# Patient Record
Sex: MALE | Race: WHITE | Employment: FULL TIME | URBAN - METROPOLITAN AREA
[De-identification: names, ages, dates, MRNs, and addresses within clinical notes are randomized per-mention and may not be internally consistent; named-entity substitution may affect disease eponyms.]

---

## 2017-01-06 RX ORDER — LISINOPRIL 2.5 MG/1
TABLET ORAL
Qty: 30 TABLET | Refills: 2 | Status: SHIPPED | OUTPATIENT
Start: 2017-01-06 | End: 2017-04-17 | Stop reason: SDUPTHER

## 2017-01-13 RX ORDER — TRAZODONE HYDROCHLORIDE 150 MG/1
TABLET ORAL
Qty: 30 TABLET | Refills: 2 | Status: SHIPPED | OUTPATIENT
Start: 2017-01-13 | End: 2017-04-17 | Stop reason: SDUPTHER

## 2017-01-17 DIAGNOSIS — F17.209 NICOTINE DEPENDENCE WITH NICOTINE-INDUCED DISORDER, UNSPECIFIED NICOTINE PRODUCT TYPE: ICD-10-CM

## 2017-01-17 RX ORDER — BUPROPION HYDROCHLORIDE 150 MG/1
TABLET ORAL
Qty: 90 TABLET | Refills: 0 | Status: SHIPPED | OUTPATIENT
Start: 2017-01-17 | End: 2017-04-17 | Stop reason: SDUPTHER

## 2017-03-27 RX ORDER — ATORVASTATIN CALCIUM 40 MG/1
TABLET, FILM COATED ORAL
Qty: 30 TABLET | Refills: 2 | Status: SHIPPED | OUTPATIENT
Start: 2017-03-27 | End: 2017-06-28 | Stop reason: SDUPTHER

## 2017-04-14 LAB
ALBUMIN SERPL-MCNC: 4.2 G/DL
ALP BLD-CCNC: 62 U/L
ALT SERPL-CCNC: 18 U/L
AST SERPL-CCNC: 20 U/L
BILIRUB SERPL-MCNC: 0.7 MG/DL (ref 0.1–1.4)
BUN BLDV-MCNC: 22 MG/DL
CALCIUM SERPL-MCNC: 9.5 MG/DL
CHLORIDE BLD-SCNC: 103 MMOL/L
CHOLESTEROL, TOTAL: 176 MG/DL
CHOLESTEROL/HDL RATIO: 4.4
CO2: 28 MMOL/L
CREAT SERPL-MCNC: 1.35 MG/DL
GFR CALCULATED: 53
GLUCOSE BLD-MCNC: 98 MG/DL
HDLC SERPL-MCNC: 44 MG/DL (ref 35–70)
LDL CHOLESTEROL CALCULATED: 109 MG/DL (ref 0–160)
POTASSIUM SERPL-SCNC: 4.2 MMOL/L
SODIUM BLD-SCNC: 140 MMOL/L
TOTAL PROTEIN: 7.3
TRIGL SERPL-MCNC: 116 MG/DL
VLDLC SERPL CALC-MCNC: 23 MG/DL

## 2017-04-17 DIAGNOSIS — F17.209 NICOTINE DEPENDENCE WITH NICOTINE-INDUCED DISORDER, UNSPECIFIED NICOTINE PRODUCT TYPE: ICD-10-CM

## 2017-04-18 RX ORDER — BUPROPION HYDROCHLORIDE 150 MG/1
TABLET ORAL
Qty: 30 TABLET | Refills: 1 | Status: SHIPPED | OUTPATIENT
Start: 2017-04-18 | End: 2017-06-17 | Stop reason: SDUPTHER

## 2017-04-18 RX ORDER — TRAZODONE HYDROCHLORIDE 150 MG/1
TABLET ORAL
Qty: 30 TABLET | Refills: 1 | Status: SHIPPED | OUTPATIENT
Start: 2017-04-18 | End: 2017-06-17 | Stop reason: SDUPTHER

## 2017-04-18 RX ORDER — LISINOPRIL 2.5 MG/1
TABLET ORAL
Qty: 30 TABLET | Refills: 1 | Status: SHIPPED | OUTPATIENT
Start: 2017-04-18 | End: 2017-06-17 | Stop reason: SDUPTHER

## 2017-04-20 DIAGNOSIS — I10 ESSENTIAL HYPERTENSION: ICD-10-CM

## 2017-04-20 DIAGNOSIS — E78.5 HYPERLIPIDEMIA, UNSPECIFIED HYPERLIPIDEMIA TYPE: ICD-10-CM

## 2017-04-28 ENCOUNTER — OFFICE VISIT (OUTPATIENT)
Dept: FAMILY MEDICINE CLINIC | Age: 67
End: 2017-04-28
Payer: MEDICARE

## 2017-04-28 VITALS
TEMPERATURE: 98.8 F | WEIGHT: 184 LBS | SYSTOLIC BLOOD PRESSURE: 130 MMHG | HEART RATE: 75 BPM | BODY MASS INDEX: 26.34 KG/M2 | DIASTOLIC BLOOD PRESSURE: 74 MMHG | HEIGHT: 70 IN

## 2017-04-28 DIAGNOSIS — F41.9 ANXIETY: ICD-10-CM

## 2017-04-28 DIAGNOSIS — I25.810 CORONARY ARTERY DISEASE INVOLVING CORONARY BYPASS GRAFT OF NATIVE HEART WITHOUT ANGINA PECTORIS: ICD-10-CM

## 2017-04-28 DIAGNOSIS — I10 ESSENTIAL HYPERTENSION: Primary | ICD-10-CM

## 2017-04-28 PROCEDURE — 99213 OFFICE O/P EST LOW 20 MIN: CPT | Performed by: FAMILY MEDICINE

## 2017-04-28 ASSESSMENT — ENCOUNTER SYMPTOMS
COUGH: 0
SORE THROAT: 0
SHORTNESS OF BREATH: 0
ABDOMINAL PAIN: 0
NAUSEA: 0
CONSTIPATION: 0

## 2017-04-28 ASSESSMENT — PATIENT HEALTH QUESTIONNAIRE - PHQ9
1. LITTLE INTEREST OR PLEASURE IN DOING THINGS: 0
SUM OF ALL RESPONSES TO PHQ9 QUESTIONS 1 & 2: 0
SUM OF ALL RESPONSES TO PHQ QUESTIONS 1-9: 0
2. FEELING DOWN, DEPRESSED OR HOPELESS: 0

## 2017-06-17 DIAGNOSIS — F17.209 NICOTINE DEPENDENCE WITH NICOTINE-INDUCED DISORDER, UNSPECIFIED NICOTINE PRODUCT TYPE: ICD-10-CM

## 2017-06-19 RX ORDER — TRAZODONE HYDROCHLORIDE 150 MG/1
TABLET ORAL
Qty: 30 TABLET | Refills: 2 | Status: SHIPPED | OUTPATIENT
Start: 2017-06-19 | End: 2017-09-21 | Stop reason: SDUPTHER

## 2017-06-19 RX ORDER — BUPROPION HYDROCHLORIDE 150 MG/1
TABLET ORAL
Qty: 30 TABLET | Refills: 2 | Status: SHIPPED | OUTPATIENT
Start: 2017-06-19 | End: 2017-09-21 | Stop reason: SDUPTHER

## 2017-06-19 RX ORDER — LISINOPRIL 2.5 MG/1
TABLET ORAL
Qty: 30 TABLET | Refills: 2 | Status: SHIPPED | OUTPATIENT
Start: 2017-06-19 | End: 2017-09-21 | Stop reason: SDUPTHER

## 2017-06-29 RX ORDER — ATORVASTATIN CALCIUM 40 MG/1
TABLET, FILM COATED ORAL
Qty: 30 TABLET | Refills: 2 | Status: SHIPPED | OUTPATIENT
Start: 2017-06-29 | End: 2017-10-01 | Stop reason: SDUPTHER

## 2017-09-01 ENCOUNTER — OFFICE VISIT (OUTPATIENT)
Dept: FAMILY MEDICINE CLINIC | Age: 67
End: 2017-09-01
Payer: MEDICARE

## 2017-09-01 VITALS
OXYGEN SATURATION: 98 % | SYSTOLIC BLOOD PRESSURE: 118 MMHG | DIASTOLIC BLOOD PRESSURE: 70 MMHG | HEIGHT: 69 IN | WEIGHT: 186 LBS | BODY MASS INDEX: 27.55 KG/M2 | TEMPERATURE: 98.2 F | HEART RATE: 59 BPM

## 2017-09-01 DIAGNOSIS — Z12.11 COLON CANCER SCREENING: ICD-10-CM

## 2017-09-01 DIAGNOSIS — I10 ESSENTIAL HYPERTENSION: Primary | ICD-10-CM

## 2017-09-01 DIAGNOSIS — I25.810 CORONARY ARTERY DISEASE INVOLVING CORONARY BYPASS GRAFT OF NATIVE HEART WITHOUT ANGINA PECTORIS: ICD-10-CM

## 2017-09-01 DIAGNOSIS — H91.93 DIMINISHED HEARING, BILATERAL: ICD-10-CM

## 2017-09-01 PROCEDURE — 99213 OFFICE O/P EST LOW 20 MIN: CPT | Performed by: FAMILY MEDICINE

## 2017-09-01 RX ORDER — HYDROXYZINE HYDROCHLORIDE 25 MG/1
25 TABLET, FILM COATED ORAL 3 TIMES DAILY PRN
COMMUNITY

## 2017-09-01 RX ORDER — FLUOXETINE HYDROCHLORIDE 60 MG/1
60 TABLET, FILM COATED ORAL; ORAL DAILY
COMMUNITY
End: 2019-12-26 | Stop reason: ALTCHOICE

## 2017-09-01 ASSESSMENT — ENCOUNTER SYMPTOMS
ABDOMINAL PAIN: 0
SORE THROAT: 0
NAUSEA: 0
DIARRHEA: 0
SHORTNESS OF BREATH: 0
COUGH: 0

## 2017-09-21 DIAGNOSIS — F17.209 NICOTINE DEPENDENCE WITH NICOTINE-INDUCED DISORDER, UNSPECIFIED NICOTINE PRODUCT TYPE: ICD-10-CM

## 2017-09-21 RX ORDER — LISINOPRIL 2.5 MG/1
TABLET ORAL
Qty: 30 TABLET | Refills: 2 | Status: SHIPPED | OUTPATIENT
Start: 2017-09-21 | End: 2017-11-27 | Stop reason: SDUPTHER

## 2017-09-21 RX ORDER — TRAZODONE HYDROCHLORIDE 150 MG/1
TABLET ORAL
Qty: 30 TABLET | Refills: 2 | Status: SHIPPED | OUTPATIENT
Start: 2017-09-21 | End: 2017-11-27 | Stop reason: SDUPTHER

## 2017-09-21 RX ORDER — BUPROPION HYDROCHLORIDE 150 MG/1
TABLET ORAL
Qty: 30 TABLET | Refills: 2 | Status: SHIPPED | OUTPATIENT
Start: 2017-09-21

## 2017-10-02 RX ORDER — ATORVASTATIN CALCIUM 40 MG/1
TABLET, FILM COATED ORAL
Qty: 30 TABLET | Refills: 2 | Status: SHIPPED | OUTPATIENT
Start: 2017-10-02 | End: 2017-11-27 | Stop reason: SDUPTHER

## 2017-11-27 RX ORDER — ATORVASTATIN CALCIUM 40 MG/1
TABLET, FILM COATED ORAL
Qty: 30 TABLET | Refills: 2 | Status: SHIPPED | OUTPATIENT
Start: 2017-11-27

## 2017-11-27 RX ORDER — TRAZODONE HYDROCHLORIDE 150 MG/1
TABLET ORAL
Qty: 30 TABLET | Refills: 2 | Status: SHIPPED | OUTPATIENT
Start: 2017-11-27

## 2017-11-27 RX ORDER — LISINOPRIL 2.5 MG/1
TABLET ORAL
Qty: 30 TABLET | Refills: 2 | Status: SHIPPED | OUTPATIENT
Start: 2017-11-27 | End: 2019-12-26 | Stop reason: ALTCHOICE

## 2017-12-01 LAB
ALBUMIN SERPL-MCNC: 3.9 G/DL
ALP BLD-CCNC: 63 U/L
ALT SERPL-CCNC: 13 U/L
ANION GAP SERPL CALCULATED.3IONS-SCNC: 8 MMOL/L
AST SERPL-CCNC: 19 U/L
AVERAGE GLUCOSE: 120
BASOPHILS ABSOLUTE: NORMAL /ΜL
BASOPHILS RELATIVE PERCENT: NORMAL %
BILIRUB SERPL-MCNC: 0.6 MG/DL (ref 0.1–1.4)
BUN BLDV-MCNC: 21 MG/DL
CALCIUM SERPL-MCNC: 9.3 MG/DL
CHLORIDE BLD-SCNC: 103 MMOL/L
CHOLESTEROL, TOTAL: 179 MG/DL
CHOLESTEROL/HDL RATIO: 3.8
CO2: 26 MMOL/L
CREAT SERPL-MCNC: 1.23 MG/DL
EOSINOPHILS ABSOLUTE: NORMAL /ΜL
EOSINOPHILS RELATIVE PERCENT: NORMAL %
GFR CALCULATED: 59
GLUCOSE BLD-MCNC: 90 MG/DL
HBA1C MFR BLD: 5.8 %
HCT VFR BLD CALC: 43.4 % (ref 41–53)
HDLC SERPL-MCNC: 47 MG/DL (ref 35–70)
HEMOGLOBIN: 14.8 G/DL (ref 13.5–17.5)
LDL CHOLESTEROL CALCULATED: 97 MG/DL (ref 0–160)
LYMPHOCYTES ABSOLUTE: NORMAL /ΜL
LYMPHOCYTES RELATIVE PERCENT: NORMAL %
MCH RBC QN AUTO: 31.7 PG
MCHC RBC AUTO-ENTMCNC: 34 G/DL
MCV RBC AUTO: 93 FL
MONOCYTES ABSOLUTE: NORMAL /ΜL
MONOCYTES RELATIVE PERCENT: NORMAL %
NEUTROPHILS ABSOLUTE: NORMAL /ΜL
NEUTROPHILS RELATIVE PERCENT: NORMAL %
PLATELET # BLD: 234 K/ΜL
PMV BLD AUTO: 8.7 FL
POTASSIUM SERPL-SCNC: 4.4 MMOL/L
RBC # BLD: 4.66 10^6/ΜL
SODIUM BLD-SCNC: 137 MMOL/L
TOTAL PROTEIN: 6.7
TRIGL SERPL-MCNC: 173 MG/DL
VITAMIN B-12: 324
VITAMIN D 25-HYDROXY: 27.1
VITAMIN D2, 25 HYDROXY: NORMAL
VITAMIN D3,25 HYDROXY: NORMAL
VLDLC SERPL CALC-MCNC: 35 MG/DL
WBC # BLD: 9.4 10^3/ML

## 2017-12-18 ENCOUNTER — TELEPHONE (OUTPATIENT)
Dept: OTHER | Facility: CLINIC | Age: 67
End: 2017-12-18

## 2017-12-23 DIAGNOSIS — F17.209 NICOTINE DEPENDENCE WITH NICOTINE-INDUCED DISORDER, UNSPECIFIED NICOTINE PRODUCT TYPE: ICD-10-CM

## 2017-12-26 RX ORDER — BUPROPION HYDROCHLORIDE 150 MG/1
TABLET ORAL
Qty: 30 TABLET | Refills: 0 | OUTPATIENT
Start: 2017-12-26

## 2018-02-27 RX ORDER — ATORVASTATIN CALCIUM 40 MG/1
TABLET, FILM COATED ORAL
Qty: 90 TABLET | Refills: 0 | OUTPATIENT
Start: 2018-02-27

## 2019-12-26 ENCOUNTER — HOSPITAL ENCOUNTER (OUTPATIENT)
Dept: PREADMISSION TESTING | Age: 69
Discharge: HOME | End: 2019-12-30
Payer: MEDICARE

## 2019-12-26 VITALS
HEART RATE: 67 BPM | DIASTOLIC BLOOD PRESSURE: 77 MMHG | BODY MASS INDEX: 33.62 KG/M2 | SYSTOLIC BLOOD PRESSURE: 121 MMHG | RESPIRATION RATE: 20 BRPM | HEIGHT: 69 IN | WEIGHT: 227 LBS | TEMPERATURE: 98.2 F

## 2019-12-26 LAB
ANION GAP SERPL CALCULATED.3IONS-SCNC: 10 MMOL/L (ref 9–17)
BUN BLDV-MCNC: 17 MG/DL (ref 8–23)
CHLORIDE BLD-SCNC: 104 MMOL/L (ref 98–107)
CO2: 26 MMOL/L (ref 20–31)
CREAT SERPL-MCNC: 1.1 MG/DL (ref 0.7–1.2)
GFR AFRICAN AMERICAN: >60 ML/MIN
GFR NON-AFRICAN AMERICAN: >60 ML/MIN
GFR SERPL CREATININE-BSD FRML MDRD: NORMAL ML/MIN/{1.73_M2}
GFR SERPL CREATININE-BSD FRML MDRD: NORMAL ML/MIN/{1.73_M2}
GLUCOSE BLD-MCNC: 109 MG/DL (ref 70–99)
HCT VFR BLD CALC: 40.9 % (ref 40.7–50.3)
HEMOGLOBIN: 12.9 G/DL (ref 13–17)
POTASSIUM SERPL-SCNC: 4.3 MMOL/L (ref 3.7–5.3)
SODIUM BLD-SCNC: 140 MMOL/L (ref 135–144)

## 2019-12-26 PROCEDURE — 36415 COLL VENOUS BLD VENIPUNCTURE: CPT

## 2019-12-26 PROCEDURE — 82565 ASSAY OF CREATININE: CPT

## 2019-12-26 PROCEDURE — 80051 ELECTROLYTE PANEL: CPT

## 2019-12-26 PROCEDURE — 85018 HEMOGLOBIN: CPT

## 2019-12-26 PROCEDURE — 84520 ASSAY OF UREA NITROGEN: CPT

## 2019-12-26 PROCEDURE — 82947 ASSAY GLUCOSE BLOOD QUANT: CPT

## 2019-12-26 PROCEDURE — 93005 ELECTROCARDIOGRAM TRACING: CPT | Performed by: ANESTHESIOLOGY

## 2019-12-26 PROCEDURE — 85014 HEMATOCRIT: CPT

## 2019-12-26 RX ORDER — SODIUM CHLORIDE, SODIUM LACTATE, POTASSIUM CHLORIDE, CALCIUM CHLORIDE 600; 310; 30; 20 MG/100ML; MG/100ML; MG/100ML; MG/100ML
1000 INJECTION, SOLUTION INTRAVENOUS CONTINUOUS
Status: CANCELLED | OUTPATIENT
Start: 2019-12-26

## 2019-12-26 RX ORDER — GABAPENTIN 100 MG/1
100 CAPSULE ORAL 3 TIMES DAILY
COMMUNITY

## 2019-12-26 RX ORDER — LISINOPRIL 2.5 MG/1
2.5 TABLET ORAL DAILY
COMMUNITY

## 2019-12-26 RX ORDER — FLUTICASONE FUROATE AND VILANTEROL 200; 25 UG/1; UG/1
200 POWDER RESPIRATORY (INHALATION)
COMMUNITY
Start: 2018-12-10

## 2019-12-26 RX ORDER — FENOFIBRATE 48 MG/1
48 TABLET, COATED ORAL DAILY
COMMUNITY

## 2019-12-26 RX ORDER — HYDRALAZINE HYDROCHLORIDE 25 MG/1
25 TABLET, FILM COATED ORAL
COMMUNITY
End: 2022-10-24

## 2019-12-27 LAB
EKG ATRIAL RATE: 72 BPM
EKG P AXIS: 56 DEGREES
EKG P-R INTERVAL: 182 MS
EKG Q-T INTERVAL: 396 MS
EKG QRS DURATION: 96 MS
EKG QTC CALCULATION (BAZETT): 433 MS
EKG R AXIS: -49 DEGREES
EKG T AXIS: 8 DEGREES
EKG VENTRICULAR RATE: 72 BPM

## 2019-12-27 PROCEDURE — 93010 ELECTROCARDIOGRAM REPORT: CPT | Performed by: INTERNAL MEDICINE

## 2022-10-24 RX ORDER — CLOPIDOGREL BISULFATE 75 MG/1
75 TABLET ORAL DAILY
Status: ON HOLD | COMMUNITY
Start: 2020-07-07 | End: 2022-10-26 | Stop reason: SDUPTHER

## 2022-10-24 RX ORDER — VILAZODONE HYDROCHLORIDE 20 MG/1
20 TABLET ORAL DAILY
COMMUNITY
Start: 2020-09-19

## 2022-10-24 NOTE — PROGRESS NOTES
Writer informed [employer] rep, that patient will need AICD interrogation done before procedure. Last one done was 1/27/22. Rep will given information about how to safely proceed with device during surgery.

## 2022-10-25 NOTE — H&P
Pre-op History and Physical  Ezequiel Nathan PA-C    Patient:  Cyril Garcia  MRN: [id number]  YOB: 1950    HISTORY OF PRESENT ILLNESS:     The patient is a 70 y.o. male who presents with history of low grade papillary UCC. Here for cystoscopy with bladder biopsy and fulguration. Patient's old records, notes and chart reviewed and summarized above. Ezequiel Nathan PA-C independently reviewed the images and verified the radiology reports from:    No results found. Past Medical History:    Past Medical History:   Diagnosis Date    Abnormal EKG     anterior fascicular block    Bladder tumor     CAD (coronary artery disease)     Clinical trial participant at discharge 03/24/2016    disqualified 3/24/2016    COPD (chronic obstructive pulmonary disease) (HCC)     Depression     GERD (gastroesophageal reflux disease)     Hearing loss     History of blood transfusion     Hyperlipidemia     Hypertension 2012    Panic attacks     Prostate hypertrophy     Under care of team     Dr. Antoine Mujica, cardiology    Wellness examination     -PCP seen in Dec. 2019       Past Surgical History:    Past Surgical History:   Procedure Laterality Date    BLADDER TUMOR EXCISION  1/15/2016    TURB with gyrus    CARDIAC DEFIBRILLATOR PLACEMENT  09/22/2016    CORONARY ARTERY BYPASS GRAFT  3/27/16    X 4 Emergent    CYSTOSCOPY      CYSTOSCOPY  1/15/2016    HERNIA REPAIR Right     LIPOMA RESECTION  child    anterior neck    TONSILLECTOMY AND ADENOIDECTOMY  child       Medications Prior to Admission:    Prior to Admission medications    Medication Sig Start Date End Date Taking?  Authorizing Provider   clopidogrel (PLAVIX) 75 MG tablet Take 75 mg by mouth daily Instructed to stop 5 days before procedure on 10/26/22, per cardiology 7/7/20  Yes Historical Provider, MD   vilazodone HCl (VIIBRYD) 20 MG TABS Take 20 mg by mouth daily 9/19/20  Yes Historical Provider, MD   Fluticasone furoate-vilanterol (BREO ELLIPTA) 200-25 MCG/INH AEPB inhaler Take 200 mcg by mouth 12/10/18   Historical Provider, MD   gabapentin (NEURONTIN) 100 MG capsule Take 100 mg by mouth 3 times daily. Historical Provider, MD   fenofibrate (TRICOR) 48 MG tablet Take 48 mg by mouth daily    Historical Provider, MD   lisinopril (PRINIVIL;ZESTRIL) 2.5 MG tablet Take 2.5 mg by mouth daily    Historical Provider, MD   traZODone (DESYREL) 150 MG tablet TAKE ONE TABLET BY MOUTH ONCE NIGHTLY 11/27/17   Freddie Jack, MD   atorvastatin (LIPITOR) 40 MG tablet TAKE ONE TABLET BY MOUTH ONCE NIGHTLY 11/27/17   Freddie Jack, MD   metoprolol tartrate (LOPRESSOR) 25 MG tablet TAKE ONE-HALF TABLET BY MOUTH TWO TIMES A DAY 11/27/17   Freddie Jack, MD   buPROPion (WELLBUTRIN XL) 150 MG extended release tablet TAKE ONE TABLET BY MOUTH EVERY MORNING  Patient taking differently: Take 450 mg by mouth every morning 9/21/17   Freddie Jack MD   hydrOXYzine (ATARAX) 25 MG tablet Take 25 mg by mouth 3 times daily as needed    Historical Provider, MD   potassium chloride (MICRO-K) 10 MEQ CR capsule Take 10 mEq by mouth 2 times daily    Historical Provider, MD   furosemide (LASIX) 20 MG tablet Take 40 mg by mouth daily     Historical Provider, MD   aspirin 81 MG EC tablet Take 1 tablet by mouth daily  Patient taking differently: Take 81 mg by mouth daily Instructed to stop 5 days before procedure on 10/26/22, per cardiology 3/29/16   Keisha Lema MD       Allergies:  Patient has no known allergies.     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   Tobacco Use    Smoking status: Former     Packs/day: 0.50     Types: Cigars, Cigarettes     Start date: 1/8/1966    Smokeless tobacco: Former     Types: Snuff, Chew     Quit date: 3/23/2016   Vaping Use    Vaping Use: Never used   Substance and Sexual Activity    Alcohol use: Yes     Alcohol/week: 0.0 standard drinks     Comment: occasionally    Drug use: Yes     Types: Marijuana Daril Marcel)     Comment: last time 10/20/22    Sexual activity: Not on file   Other Topics Concern    Not on file   Social History Narrative    Not on file     Social Determinants of Health     Financial Resource Strain: Not on file   Food Insecurity: Not on file   Transportation Needs: Not on file   Physical Activity: Not on file   Stress: Not on file   Social Connections: Not on file   Intimate Partner Violence: Not on file   Housing Stability: Not on file       Family History:    Family History   Problem Relation Age of Onset    Other Mother         blind    Cancer Father         leukemia       REVIEW OF SYSTEMS:  Constitutional: negative  Eyes: negative  Respiratory: negative  Cardiovascular: negative  Gastrointestinal: negative  Genitourinary: no acute issues  Musculoskeletal: negative  Skin: negative   Neurological: negative  Hematological/Lymphatic: negative  Psychological: negative    PHYSICAL EXAM:    No data found. Constitutional: Patient in NAD  Neuro: Alert and oriented to person, place, and time  Psych: Mood and affect normal  Skin: Clean, dry, intact   Lungs: Respiratory effort normal, CTA  Cardiovascular:  Normal peripheral pulses; no murmur. Normal rhythm  Abdomen: Soft, non-tender, non-distended, no hepatosplenomegaly or hernia, CVA tenderness none  Bladder: Non-tender and non-disdended   : Non-tender, skin intact, no lesions       LABS:   No results for input(s): WBC, HGB, HCT, MCV, PLT in the last 72 hours. No results for input(s): NA, K, CL, CO2, PHOS, BUN, CREATININE, CA in the last 72 hours.   Lab Results   Component Value Date    PSA 1.19 12/04/2015         Urinalysis: No results for input(s): COLORU, PHUR, LABCAST, WBCUA, RBCUA, MUCUS, TRICHOMONAS, YEAST, BACTERIA, CLARITYU, SPECGRAV, LEUKOCYTESUR, UROBILINOGEN, Oj Monico in the last 72 hours. Invalid input(s): NITRATE, GLUCOSEUKETONESUAMORPHOUS     -----------------------------------------------------------------    ASSESSMENT AND PLAN:    Impression:    Bladder Cancer   Low grade papillary UCC  Patient Active Problem List   Diagnosis    Essential hypertension    Hyperlipidemia    NSTEMI (non-ST elevated myocardial infarction) ([facility] [address]    Tobacco abuse    S/P cardiac cath- Multivessel CAD 3/24/16-Dr. winters    S/P CABG x 4    Coronary artery disease involving coronary bypass graft of native heart without angina pectoris    Anxiety       Plan: Cystoscopy, bladder biopsy with fulguration in OR today. Consent obtained    The patient was counseled at length about the risks of andrew Covid-19 during their perioperative period and any recovery window from their procedure. The patient was made aware that andrew Covid-19  may worsen their prognosis for recovering from their procedure  and lend to a higher morbidity and/or mortality risk. All material risks, benefits, and reasonable alternatives including postponing the procedure were discussed. The patient does wish to proceed with the procedure at this time.       Jessy Jo MD

## 2022-10-26 ENCOUNTER — HOSPITAL ENCOUNTER (OUTPATIENT)
Age: 72
Setting detail: OUTPATIENT SURGERY
Discharge: HOME | End: 2022-10-26
Attending: UROLOGY | Admitting: UROLOGY
Payer: MEDICARE

## 2022-10-26 ENCOUNTER — ANESTHESIA EVENT (OUTPATIENT)
Dept: OPERATING ROOM | Age: 72
End: 2022-10-26
Payer: MEDICARE

## 2022-10-26 ENCOUNTER — ANESTHESIA (OUTPATIENT)
Dept: OPERATING ROOM | Age: 72
End: 2022-10-26
Payer: MEDICARE

## 2022-10-26 VITALS
HEIGHT: 69 IN | SYSTOLIC BLOOD PRESSURE: 133 MMHG | DIASTOLIC BLOOD PRESSURE: 73 MMHG | BODY MASS INDEX: 30.96 KG/M2 | WEIGHT: 209 LBS | OXYGEN SATURATION: 95 % | RESPIRATION RATE: 16 BRPM | TEMPERATURE: 96.8 F | HEART RATE: 74 BPM

## 2022-10-26 DIAGNOSIS — G89.18 POST-OP PAIN: Primary | ICD-10-CM

## 2022-10-26 DIAGNOSIS — C67.9 MALIGNANT NEOPLASM OF URINARY BLADDER, UNSPECIFIED SITE (HCC): ICD-10-CM

## 2022-10-26 LAB
EGFR, POC: 58 ML/MIN/1.73M2
GLUCOSE BLD-MCNC: 106 MG/DL (ref 74–100)
POC BUN: 16 MG/DL (ref 8–26)
POC CREATININE: 1.31 MG/DL (ref 0.51–1.19)
POC POTASSIUM: 3.8 MMOL/L (ref 3.5–4.5)

## 2022-10-26 PROCEDURE — 3600000004 HC SURGERY LEVEL 4 BASE: Performed by: UROLOGY

## 2022-10-26 PROCEDURE — 88342 IMHCHEM/IMCYTCHM 1ST ANTB: CPT

## 2022-10-26 PROCEDURE — 82565 ASSAY OF CREATININE: CPT

## 2022-10-26 PROCEDURE — 3600000014 HC SURGERY LEVEL 4 ADDTL 15MIN: Performed by: UROLOGY

## 2022-10-26 PROCEDURE — 2720000010 HC SURG SUPPLY STERILE: Performed by: UROLOGY

## 2022-10-26 PROCEDURE — 3700000000 HC ANESTHESIA ATTENDED CARE: Performed by: UROLOGY

## 2022-10-26 PROCEDURE — 2580000003 HC RX 258: Performed by: UROLOGY

## 2022-10-26 PROCEDURE — 3700000001 HC ADD 15 MINUTES (ANESTHESIA): Performed by: UROLOGY

## 2022-10-26 PROCEDURE — C1758 CATHETER, URETERAL: HCPCS | Performed by: UROLOGY

## 2022-10-26 PROCEDURE — 7100000041 HC SPAR PHASE II RECOVERY - ADDTL 15 MIN: Performed by: UROLOGY

## 2022-10-26 PROCEDURE — 7100000040 HC SPAR PHASE II RECOVERY - FIRST 15 MIN: Performed by: UROLOGY

## 2022-10-26 PROCEDURE — 84520 ASSAY OF UREA NITROGEN: CPT

## 2022-10-26 PROCEDURE — 2709999900 HC NON-CHARGEABLE SUPPLY: Performed by: UROLOGY

## 2022-10-26 PROCEDURE — 82947 ASSAY GLUCOSE BLOOD QUANT: CPT

## 2022-10-26 PROCEDURE — 93005 ELECTROCARDIOGRAM TRACING: CPT | Performed by: ANESTHESIOLOGY

## 2022-10-26 PROCEDURE — 6360000002 HC RX W HCPCS: Performed by: NURSE ANESTHETIST, CERTIFIED REGISTERED

## 2022-10-26 PROCEDURE — 88305 TISSUE EXAM BY PATHOLOGIST: CPT

## 2022-10-26 PROCEDURE — 84132 ASSAY OF SERUM POTASSIUM: CPT

## 2022-10-26 RX ORDER — CIPROFLOXACIN 500 MG/1
500 TABLET, FILM COATED ORAL 2 TIMES DAILY
Qty: 6 TABLET | Refills: 0 | Status: SHIPPED | OUTPATIENT
Start: 2022-10-26 | End: 2022-10-29

## 2022-10-26 RX ORDER — SODIUM CHLORIDE 0.9 % (FLUSH) 0.9 %
5-40 SYRINGE (ML) INJECTION PRN
Status: DISCONTINUED | OUTPATIENT
Start: 2022-10-26 | End: 2022-10-26 | Stop reason: HOSPADM

## 2022-10-26 RX ORDER — MAGNESIUM HYDROXIDE 1200 MG/15ML
LIQUID ORAL PRN
Status: DISCONTINUED | OUTPATIENT
Start: 2022-10-26 | End: 2022-10-26 | Stop reason: ALTCHOICE

## 2022-10-26 RX ORDER — PROPOFOL 10 MG/ML
INJECTION, EMULSION INTRAVENOUS CONTINUOUS PRN
Status: DISCONTINUED | OUTPATIENT
Start: 2022-10-26 | End: 2022-10-26 | Stop reason: SDUPTHER

## 2022-10-26 RX ORDER — FENTANYL CITRATE 50 UG/ML
INJECTION, SOLUTION INTRAMUSCULAR; INTRAVENOUS PRN
Status: DISCONTINUED | OUTPATIENT
Start: 2022-10-26 | End: 2022-10-26 | Stop reason: SDUPTHER

## 2022-10-26 RX ORDER — PROPOFOL 10 MG/ML
INJECTION, EMULSION INTRAVENOUS PRN
Status: DISCONTINUED | OUTPATIENT
Start: 2022-10-26 | End: 2022-10-26 | Stop reason: SDUPTHER

## 2022-10-26 RX ORDER — ONDANSETRON 2 MG/ML
4 INJECTION INTRAMUSCULAR; INTRAVENOUS
Status: DISCONTINUED | OUTPATIENT
Start: 2022-10-26 | End: 2022-10-26 | Stop reason: HOSPADM

## 2022-10-26 RX ORDER — SODIUM CHLORIDE 0.9 % (FLUSH) 0.9 %
5-40 SYRINGE (ML) INJECTION EVERY 12 HOURS SCHEDULED
Status: DISCONTINUED | OUTPATIENT
Start: 2022-10-26 | End: 2022-10-26 | Stop reason: HOSPADM

## 2022-10-26 RX ORDER — FENTANYL CITRATE 50 UG/ML
25 INJECTION, SOLUTION INTRAMUSCULAR; INTRAVENOUS EVERY 5 MIN PRN
Status: DISCONTINUED | OUTPATIENT
Start: 2022-10-26 | End: 2022-10-26 | Stop reason: HOSPADM

## 2022-10-26 RX ORDER — SODIUM CHLORIDE 9 MG/ML
INJECTION, SOLUTION INTRAVENOUS PRN
Status: DISCONTINUED | OUTPATIENT
Start: 2022-10-26 | End: 2022-10-26 | Stop reason: HOSPADM

## 2022-10-26 RX ORDER — CEFAZOLIN SODIUM 1 G/3ML
INJECTION, POWDER, FOR SOLUTION INTRAMUSCULAR; INTRAVENOUS PRN
Status: DISCONTINUED | OUTPATIENT
Start: 2022-10-26 | End: 2022-10-26 | Stop reason: SDUPTHER

## 2022-10-26 RX ORDER — HYDROCODONE BITARTRATE AND ACETAMINOPHEN 5; 325 MG/1; MG/1
1 TABLET ORAL EVERY 4 HOURS PRN
Qty: 5 TABLET | Refills: 0 | Status: SHIPPED | OUTPATIENT
Start: 2022-10-26 | End: 2022-10-29

## 2022-10-26 RX ORDER — FENTANYL CITRATE 50 UG/ML
50 INJECTION, SOLUTION INTRAMUSCULAR; INTRAVENOUS EVERY 5 MIN PRN
Status: DISCONTINUED | OUTPATIENT
Start: 2022-10-26 | End: 2022-10-26 | Stop reason: HOSPADM

## 2022-10-26 RX ORDER — CLOPIDOGREL BISULFATE 75 MG/1
75 TABLET ORAL DAILY
Qty: 30 TABLET | Refills: 3
Start: 2022-10-26

## 2022-10-26 RX ADMIN — SODIUM CHLORIDE, SODIUM LACTATE, POTASSIUM CHLORIDE, CALCIUM CHLORIDE: 600; 310; 30; 20 INJECTION, SOLUTION INTRAVENOUS at 11:38

## 2022-10-26 RX ADMIN — PROPOFOL 30 MG: 10 INJECTION, EMULSION INTRAVENOUS at 11:39

## 2022-10-26 RX ADMIN — PROPOFOL 100 MCG/KG/MIN: 10 INJECTION, EMULSION INTRAVENOUS at 11:31

## 2022-10-26 RX ADMIN — FENTANYL CITRATE 25 MCG: 50 INJECTION, SOLUTION INTRAMUSCULAR; INTRAVENOUS at 11:46

## 2022-10-26 RX ADMIN — FENTANYL CITRATE 25 MCG: 50 INJECTION, SOLUTION INTRAMUSCULAR; INTRAVENOUS at 12:03

## 2022-10-26 RX ADMIN — PROPOFOL 20 MG: 10 INJECTION, EMULSION INTRAVENOUS at 11:42

## 2022-10-26 RX ADMIN — PROPOFOL 50 MG: 10 INJECTION, EMULSION INTRAVENOUS at 11:30

## 2022-10-26 RX ADMIN — FENTANYL CITRATE 25 MCG: 50 INJECTION, SOLUTION INTRAMUSCULAR; INTRAVENOUS at 11:30

## 2022-10-26 RX ADMIN — CEFAZOLIN 2 G: 1 INJECTION, POWDER, FOR SOLUTION INTRAMUSCULAR; INTRAVENOUS at 11:33

## 2022-10-26 RX ADMIN — FENTANYL CITRATE 25 MCG: 50 INJECTION, SOLUTION INTRAMUSCULAR; INTRAVENOUS at 11:41

## 2022-10-26 RX ADMIN — PROPOFOL 20 MG: 10 INJECTION, EMULSION INTRAVENOUS at 11:35

## 2022-10-26 NOTE — DISCHARGE INSTRUCTIONS
Discharge instructions: Cystoscopy, bladder biopsy  Hold blood thinners for 5 more days (aspirin and plavix)  You may experience pain and/or burning with urination and see blood in the urine after your procedure. This should resolve over the next few days. Ok to discharge home in good condition  No heavy lifting, >10 lbs for today  Patient should avoid strenuous activity for today  Patient should walk moderately at home   [id number] to shower   Patient may resume diet as tolerated  Please call attending physician or hospital  with questions  Call or go to ED if fever (> 101F), intractable nausea vomiting or pain, inability to urinate  Please take prescriptions as directed if prescribed      Patient should follow up with Dr. Bretha Burrows, in 1-2 weeks for results/follow up, call to confirm appointment    No alcoholic beverages, no driving or operating machinery, no making important decisions for 24 hours. You may have a normal diet but should eat lightly day of surgery. Drink plenty of fluids.   Urinate within 8 hours after surgery, if unable to urinate call your doctor    Call your doctor for the following:   Chills   Temperature greater than 101   Pain that is not tolerable despite taking pain medicine as ordered   There is increased swelling, redness or warmth at surgical site   There is increased drainage or bleeding from surgical site   Do not remove surgical dressing unless instructed to do so by your surgeon

## 2022-10-26 NOTE — OP NOTE
FACILITY:  [address] [place] [address] Bret Parr  1950  [id number]    DATE: 10/26/22  SURGEON:  Dr. Phuc Norman MD  ASSISTANT: Ramona Guerrier MD  PREOPERATIVE DIAGNOSIS:  History of bladder cancer  Bladder lesion  POSTOPERATIVE DIAGNOSIS:  Same  PROCEDURES PERFORMED:  1. Cystourethroscopy  2. Bladder biopsy with fulguration  ANESTHESIA:  MAC  COMPLICATIONS:  None  DRAINS:  None  SPECIMEN:  ID Type Source Tests Collected by Time Destination   A : BLADDER BIOPSY Tissue Bladder SURGICAL PATHOLOGY Crys Gage MD 10/26/2022 1150    ESTIMATED BLOOD LOSS:  Less than 5 mL. INDICATIONS FOR THE PROCEDURE:  Dorita Stanton is a 70 y.o. male presents with a history of bladder cancer. A cystoscopy was performed which showed an erythematous patch on the posterior bladder wall. The patient follows up today to obtain tissue diagnosis. The risks and benefits of the procedure, as well as possible alternatives and complications were discussed and he consented. DETAILS OF THE PROCEDURE:  The patient was correctly identified in the preoperative holding area. He was brought back to the operating room and placed in the dorsal lithotomy position. Anesthesia was administered; antibiotics administered by Anesthesia. EPC cuffs  were on and functional. Patient was then prepped and draped in the usual sterile fashion. Once an appropriate time out had been performed, with all parties  consenting, a 25 Telugu cystoscope with a 30-degree lens was placed through the urethra into the bladder. The patient had multiple bladder spasms during the procedure. A thorough and complete cystoscopy was performed. Abnormalities include: erythematous patch on posterior bladder wall. The lesion was biopsied with a cold cup biopsy forcep, and then the area was fulgurated with a bugbee electrode. Hemostasis was achieved. The specimen was sent to pathology. The bladder was drained and the cystoscope was removed.    Kvng Pan was present and scrubbed for the entire procedure. The patient tolerated the procedure well and was sent to the PACU for postoperative monitoring. DISPOSITION:  The patient was discharged home in stable condition with instructions to follow up in clinic for pathology results.

## 2022-10-26 NOTE — ANESTHESIA POSTPROCEDURE EVALUATION
Department of Anesthesiology  Postprocedure Note    Patient: Samuel Ceballos  MRN: [id number]  YOB: 1950  Date of evaluation: 10/26/2022      Procedure Summary     Date: 10/26/22 Room / Location: [address]    Anesthesia Start: 3351 Anesthesia Stop: 5921    Procedure: CYSTOSCOPY BLADDER BIOPSY, FULGARATION Diagnosis:       Malignant neoplasm of urinary bladder, unspecified site ([address]      (BLADDER CANCER)    Surgeons: Alex Mcconnell MD Responsible Provider: Marily Taylor MD    Anesthesia Type: MAC ASA Status: 3          Anesthesia Type: No value filed.     Jose Guadalupe Phase I:      Jose Guadalupe Phase II: Jose Guadlaupe Score: 10    POST-OP ANESTHESIA NOTE       /73   Pulse 74   Temp 96.8 °F (36 °C) (Temporal)   Resp 16   Ht 5' 9\" (1.753 m)   Wt 209 lb (94.8 kg)   SpO2 95%   BMI 30.86 kg/m²    Pain Assessment: None - Denies Pain          Anesthesia Post Evaluation    Patient location during evaluation: PACU  Patient participation: complete - patient participated  Level of consciousness: awake  Pain score: 0  Airway patency: patent  Nausea & Vomiting: no vomiting and no nausea  Complications: no  Cardiovascular status: hemodynamically stable  Respiratory status: acceptable  Hydration status: stable

## 2022-10-26 NOTE — ANESTHESIA PRE PROCEDURE
Department of Anesthesiology  Preprocedure Note       Name:  Everardo Chicas   Age:  70 y.o.  :  1950                                          MRN:  [id number]         Date:  10/26/2022      Surgeon: April Olivares):  Kaylan Mckeon MD    Procedure: Procedure(s):  CYSTOSCOPY BLADDER BIOPSY, FULGARATION    Medications prior to admission:   Prior to Admission medications    Medication Sig Start Date End Date Taking? Authorizing Provider   clopidogrel (PLAVIX) 75 MG tablet Take 75 mg by mouth daily Instructed to stop 5 days before procedure on 10/26/22, per cardiology 20  Yes Historical Provider, MD   vilazodone HCl (VIIBRYD) 20 MG TABS Take 20 mg by mouth daily 20  Yes Historical Provider, MD   Fluticasone furoate-vilanterol (BREO ELLIPTA) 200-25 MCG/INH AEPB inhaler Take 200 mcg by mouth 12/10/18   Historical Provider, MD   gabapentin (NEURONTIN) 100 MG capsule Take 100 mg by mouth 3 times daily.      Historical Provider, MD   fenofibrate (TRICOR) 48 MG tablet Take 48 mg by mouth daily    Historical Provider, MD   lisinopril (PRINIVIL;ZESTRIL) 2.5 MG tablet Take 2.5 mg by mouth daily    Historical Provider, MD   traZODone (DESYREL) 150 MG tablet TAKE ONE TABLET BY MOUTH ONCE NIGHTLY 17   Kevin Uribe MD   atorvastatin (LIPITOR) 40 MG tablet TAKE ONE TABLET BY MOUTH ONCE NIGHTLY 17   Kevin Uribe MD   metoprolol tartrate (LOPRESSOR) 25 MG tablet TAKE ONE-HALF TABLET BY MOUTH TWO TIMES A DAY 17   Kevin Uribe MD   buPROPion (WELLBUTRIN XL) 150 MG extended release tablet TAKE ONE TABLET BY MOUTH EVERY MORNING  Patient taking differently: Take 450 mg by mouth every morning 17   Kevin Uribe MD   hydrOXYzine (ATARAX) 25 MG tablet Take 25 mg by mouth 3 times daily as needed    Historical Provider, MD   potassium chloride (MICRO-K) 10 MEQ CR capsule Take 10 mEq by mouth 2 times daily    Historical Provider, MD   furosemide (LASIX) 20 MG tablet Take 40 mg by mouth daily Historical Provider, MD   aspirin 81 MG EC tablet Take 1 tablet by mouth daily  Patient taking differently: Take 81 mg by mouth daily Instructed to stop 5 days before procedure on 10/26/22, per cardiology 3/29/16   Rudolph Veras MD       Current medications:    Current Facility-Administered Medications   Medication Dose Route Frequency Provider Last Rate Last Admin    ceFAZolin (ANCEF) 2000 mg in sterile water 20 mL IV syringe  2,000 mg IntraVENous On Call to [address] [facility], [place]-         Facility-Administered Medications Ordered in Other Encounters   Medication Dose Route Frequency Provider Last Rate Last Admin    lactated ringers infusion 1,000 mL  1,000 mL IntraVENous Continuous Mauro Do MD   Stopped at 01/15/16 1330    fentaNYL (SUBLIMAZE) injection 25 mcg  25 mcg IntraVENous Q5 Min PRN Eleonore MD Pierre        fentaNYL (SUBLIMAZE) injection 50 mcg  50 mcg IntraVENous Q5 Min PRN Eleonore MD Pierre           Allergies:  No Known Allergies    Problem List:    Patient Active Problem List   Diagnosis Code    Essential hypertension I10    Hyperlipidemia E78.5    NSTEMI (non-ST elevated myocardial infarction) ([facility] [address] I21.4    Tobacco abuse Z72.0    S/P cardiac cath- Multivessel CAD 3/24/16-Dr. Aleksander Crowell P20.838    S/P CABG x 4 Z95.1    Coronary artery disease involving coronary bypass graft of native heart without angina pectoris I25.810    Anxiety F41.9       Past Medical History:        Diagnosis Date    Abnormal EKG     anterior fascicular block    Bladder tumor     CAD (coronary artery disease)     Clinical trial participant at discharge 03/24/2016    disqualified 3/24/2016    COPD (chronic obstructive pulmonary disease) ([facility] [address]     Depression     GERD (gastroesophageal reflux disease)     Hearing loss     History of blood transfusion     Hyperlipidemia     Hypertension 2012    Panic attacks     Prostate hypertrophy     Under care of team     Dr. Jemma Corrales, cardiology    Wellness examination     -PCP seen in Dec. 2019       Past Surgical History:        Procedure Laterality Date    BLADDER TUMOR EXCISION  1/15/2016    TURB with gyrus    CARDIAC DEFIBRILLATOR PLACEMENT  09/22/2016    CORONARY ARTERY BYPASS GRAFT  3/27/16    X 4 Emergent    CYSTOSCOPY      CYSTOSCOPY  1/15/2016    HERNIA REPAIR Right     LIPOMA RESECTION  child    anterior neck    TONSILLECTOMY AND ADENOIDECTOMY  child       Social History:    Social History     Tobacco Use    Smoking status: Former     Packs/day: 0.50     Types: Cigars, Cigarettes     Start date: 1/8/1966    Smokeless tobacco: Former     Types: Snuff, Chew     Quit date: 3/23/2016   Substance Use Topics    Alcohol use: Yes     Alcohol/week: 0.0 standard drinks     Comment: occasionally                                Counseling given: Not Answered      Vital Signs (Current):   Vitals:    10/24/22 1315 10/26/22 0958   BP:  138/80   Pulse:  96   Resp:  20   Temp:  97.9 °F (36.6 °C)   TempSrc:  Temporal   SpO2:  95%   Weight: 209 lb (94.8 kg)    Height: 5' 9\" (1.753 m)                                               BP Readings from Last 3 Encounters:   10/26/22 138/80   12/26/19 121/77   09/01/17 118/70       NPO Status: Time of last liquid consumption: 2100                        Time of last solid consumption: 2100                        Date of last liquid consumption: 10/25/22                        Date of last solid food consumption: 10/25/22    BMI:   Wt Readings from Last 3 Encounters:   10/24/22 209 lb (94.8 kg)   12/26/19 227 lb (103 kg)   09/01/17 186 lb (84.4 kg)     Body mass index is 30.86 kg/m².     CBC:   Lab Results   Component Value Date/Time    WBC 9.4 12/01/2017 12:00 AM    RBC 4.66 12/01/2017 12:00 AM    RBC 4.85 02/13/2012 11:22 AM    HGB 12.9 12/26/2019 11:20 AM    HCT 40.9 12/26/2019 11:20 AM    MCV 93 12/01/2017 12:00 AM    RDW 14.9 05/19/2016 06:11 AM     12/01/2017 12:00 AM     02/13/2012 11:22 AM CMP:   Lab Results   Component Value Date/Time     12/26/2019 11:20 AM    K 4.3 12/26/2019 11:20 AM     12/26/2019 11:20 AM    CO2 26 12/26/2019 11:20 AM    BUN 17 12/26/2019 11:20 AM    CREATININE 1.31 10/26/2022 10:21 AM    CREATININE 1.10 12/26/2019 11:20 AM    GFRAA >60 12/26/2019 11:20 AM    LABGLOM >60 12/26/2019 11:20 AM    GLUCOSE 109 12/26/2019 11:20 AM    GLUCOSE 105 02/13/2012 11:22 AM    PROT 5.1 03/24/2016 06:22 AM    CALCIUM 9.3 12/01/2017 12:00 AM    BILITOT 0.6 12/01/2017 12:00 AM    ALKPHOS 63 12/01/2017 12:00 AM    AST 19 12/01/2017 12:00 AM    ALT 13 12/01/2017 12:00 AM       POC Tests:   Recent Labs     10/26/22  1021   POCGLU 106*   POCK 3.8   POCBUN 16       Coags:   Lab Results   Component Value Date/Time    PROTIME 10.6 03/30/2016 08:41 AM    INR 1.0 03/30/2016 08:41 AM    APTT 30.6 03/27/2016 12:51 PM       HCG (If Applicable): No results found for: PREGTESTUR, PREGSERUM, HCG, HCGQUANT     ABGs: No results found for: PHART, PO2ART, OGX8ZBA, ALX5BAU, BEART, I3AVVPRD     Type & Screen (If Applicable):  No results found for: LABABO, LABRH    Drug/Infectious Status (If Applicable):  No results found for: HIV, HEPCAB    COVID-19 Screening (If Applicable): No results found for: COVID19      EKG 2019  Normal sinus rhythm  Left anterior fascicular block  Abnormal ECG  When compared with ECG of 18-MAY-2016 06:41,  Incomplete right bundle branch block is no longer Present  Minimal criteria for Anterior infarct are no longer Present    TTE 2016  Dilated left ventricle with moderate to severely decreased systolic  function. Dows is heavily trabeculated . Can not completely rule out non-compaction  syndrome. Estimated ejection fraction is 20-25%. Normal right ventricular size and function. Trivial aortic insufficiency. Mild mitral regurgitation. Mild tricuspid regurgitation.   Estimated right ventricular systolic pressure is 57 mmHg consistent with  moderate pulmonary hypertension. Anesthesia Evaluation    Airway: Mallampati: III  TM distance: >3 FB   Neck ROM: full  Mouth opening: > = 3 FB   Dental:    (+) other      Pulmonary:   (+) COPD:  decreased breath sounds                            Cardiovascular:    (+) hypertension:, pacemaker: AICD, past MI:, CAD:, CABG/stent:,                   Neuro/Psych:   (+) psychiatric history:            GI/Hepatic/Renal:   (+) GERD:,           Endo/Other: Negative Endo/Other ROS                    Abdominal:   (+) obese,           Vascular: negative vascular ROS. Other Findings:           Anesthesia Plan      MAC     ASA 3       Induction: intravenous. MIPS: Postoperative opioids intended. Anesthetic plan and risks discussed with patient. Plan discussed with CRNA.                     Karen Wolfe MD   10/26/2022

## 2022-10-27 LAB
EKG ATRIAL RATE: 80 BPM
EKG P AXIS: 23 DEGREES
EKG P-R INTERVAL: 196 MS
EKG Q-T INTERVAL: 408 MS
EKG QRS DURATION: 120 MS
EKG QTC CALCULATION (BAZETT): 470 MS
EKG R AXIS: -57 DEGREES
EKG T AXIS: 42 DEGREES
EKG VENTRICULAR RATE: 80 BPM

## 2022-10-27 PROCEDURE — 93010 ELECTROCARDIOGRAM REPORT: CPT | Performed by: INTERNAL MEDICINE

## 2022-10-28 LAB — SURGICAL PATHOLOGY REPORT: NORMAL

## 2022-11-11 ENCOUNTER — APPOINTMENT (OUTPATIENT)
Dept: GENERAL RADIOLOGY | Age: 72
DRG: 553 | End: 2022-11-11
Payer: MEDICARE

## 2022-11-11 ENCOUNTER — HOSPITAL ENCOUNTER (INPATIENT)
Age: 72
LOS: 7 days | Discharge: NURSING, REHAB OR LONG-TERM CARE | DRG: 553 | End: 2022-11-18
Attending: EMERGENCY MEDICINE | Admitting: STUDENT IN AN ORGANIZED HEALTH CARE EDUCATION/TRAINING PROGRAM
Payer: MEDICARE

## 2022-11-11 ENCOUNTER — APPOINTMENT (OUTPATIENT)
Dept: CT IMAGING | Age: 72
DRG: 553 | End: 2022-11-11
Payer: MEDICARE

## 2022-11-11 DIAGNOSIS — M11.271: ICD-10-CM

## 2022-11-11 DIAGNOSIS — A41.9 SEPSIS WITHOUT ACUTE ORGAN DYSFUNCTION, DUE TO UNSPECIFIED ORGANISM (HCC): Primary | ICD-10-CM

## 2022-11-11 DIAGNOSIS — M13.10 MONOARTICULAR ARTHRITIS: ICD-10-CM

## 2022-11-11 LAB
ABSOLUTE EOS #: 0 K/UL (ref 0–0.4)
ABSOLUTE IMMATURE GRANULOCYTE: 0 K/UL (ref 0–0.3)
ABSOLUTE LYMPH #: 0.53 K/UL (ref 1–4.8)
ABSOLUTE MONO #: 1.59 K/UL (ref 0.1–0.8)
ALBUMIN SERPL-MCNC: 3.7 G/DL (ref 3.5–5.2)
ALBUMIN/GLOBULIN RATIO: 0.9 (ref 1–2.5)
ALP BLD-CCNC: 89 U/L (ref 40–129)
ALT SERPL-CCNC: 8 U/L (ref 5–41)
ANION GAP SERPL CALCULATED.3IONS-SCNC: 13 MMOL/L (ref 9–17)
AST SERPL-CCNC: 14 U/L
BACTERIA: ABNORMAL
BASOPHILS # BLD: 0 % (ref 0–2)
BASOPHILS ABSOLUTE: 0 K/UL (ref 0–0.2)
BILIRUB SERPL-MCNC: 0.8 MG/DL (ref 0.3–1.2)
BILIRUBIN URINE: NEGATIVE
BUN BLDV-MCNC: 15 MG/DL (ref 8–23)
C-REACTIVE PROTEIN: 391.1 MG/L (ref 0–5)
CALCIUM SERPL-MCNC: 9.8 MG/DL (ref 8.6–10.4)
CHLORIDE BLD-SCNC: 103 MMOL/L (ref 98–107)
CO2: 26 MMOL/L (ref 20–31)
COLOR: ABNORMAL
CREAT SERPL-MCNC: 1.03 MG/DL (ref 0.7–1.2)
EOSINOPHILS RELATIVE PERCENT: 0 % (ref 1–4)
EPITHELIAL CELLS UA: ABNORMAL /HPF (ref 0–5)
FLU A ANTIGEN: NEGATIVE
FLU B ANTIGEN: NEGATIVE
GFR SERPL CREATININE-BSD FRML MDRD: >60 ML/MIN/1.73M2
GLUCOSE BLD-MCNC: 130 MG/DL (ref 70–99)
GLUCOSE URINE: NEGATIVE
HCT VFR BLD CALC: 42.5 % (ref 40.7–50.3)
HEMOGLOBIN: 13.3 G/DL (ref 13–17)
IMMATURE GRANULOCYTES: 0 %
INR BLD: 1.1
KETONES, URINE: ABNORMAL
LACTIC ACID, SEPSIS WHOLE BLOOD: 1.1 MMOL/L (ref 0.5–1.9)
LACTIC ACID, SEPSIS WHOLE BLOOD: 1.8 MMOL/L (ref 0.5–1.9)
LEUKOCYTE ESTERASE, URINE: ABNORMAL
LYMPHOCYTES # BLD: 3 % (ref 24–44)
MCH RBC QN AUTO: 23.9 PG (ref 25.2–33.5)
MCHC RBC AUTO-ENTMCNC: 31.3 G/DL (ref 28.4–34.8)
MCV RBC AUTO: 76.4 FL (ref 82.6–102.9)
MONOCYTES # BLD: 9 % (ref 1–7)
MORPHOLOGY: ABNORMAL
NITRITE, URINE: NEGATIVE
NRBC AUTOMATED: 0 PER 100 WBC
PARTIAL THROMBOPLASTIN TIME: 25.5 SEC (ref 20.5–30.5)
PDW BLD-RTO: 16.6 % (ref 11.8–14.4)
PH UA: 5 (ref 5–8)
PLATELET # BLD: 467 K/UL (ref 138–453)
PMV BLD AUTO: 10.1 FL (ref 8.1–13.5)
POTASSIUM SERPL-SCNC: 4 MMOL/L (ref 3.7–5.3)
PROCALCITONIN: 0.22 NG/ML
PROTEIN UA: ABNORMAL
PROTHROMBIN TIME: 11.9 SEC (ref 9.1–12.3)
RBC # BLD: 5.56 M/UL (ref 4.21–5.77)
RBC UA: ABNORMAL /HPF (ref 0–2)
SARS-COV-2, RAPID: NOT DETECTED
SEDIMENTATION RATE, ERYTHROCYTE: 117 MM/HR (ref 0–20)
SEG NEUTROPHILS: 88 % (ref 36–66)
SEGMENTED NEUTROPHILS ABSOLUTE COUNT: 15.58 K/UL (ref 1.8–7.7)
SODIUM BLD-SCNC: 142 MMOL/L (ref 135–144)
SPECIFIC GRAVITY UA: 1.03 (ref 1–1.03)
SPECIMEN DESCRIPTION: NORMAL
TOTAL PROTEIN: 7.7 G/DL (ref 6.4–8.3)
TURBIDITY: CLEAR
URINE HGB: ABNORMAL
UROBILINOGEN, URINE: NORMAL
WBC # BLD: 17.7 K/UL (ref 3.5–11.3)
WBC UA: ABNORMAL /HPF (ref 0–5)

## 2022-11-11 PROCEDURE — 87804 INFLUENZA ASSAY W/OPTIC: CPT

## 2022-11-11 PROCEDURE — 99222 1ST HOSP IP/OBS MODERATE 55: CPT | Performed by: INTERNAL MEDICINE

## 2022-11-11 PROCEDURE — 81001 URINALYSIS AUTO W/SCOPE: CPT

## 2022-11-11 PROCEDURE — 84443 ASSAY THYROID STIM HORMONE: CPT

## 2022-11-11 PROCEDURE — 87040 BLOOD CULTURE FOR BACTERIA: CPT

## 2022-11-11 PROCEDURE — 99285 EMERGENCY DEPT VISIT HI MDM: CPT

## 2022-11-11 PROCEDURE — 83605 ASSAY OF LACTIC ACID: CPT

## 2022-11-11 PROCEDURE — 86140 C-REACTIVE PROTEIN: CPT

## 2022-11-11 PROCEDURE — 6360000002 HC RX W HCPCS

## 2022-11-11 PROCEDURE — 80053 COMPREHEN METABOLIC PANEL: CPT

## 2022-11-11 PROCEDURE — 84145 PROCALCITONIN (PCT): CPT

## 2022-11-11 PROCEDURE — 85730 THROMBOPLASTIN TIME PARTIAL: CPT

## 2022-11-11 PROCEDURE — 87086 URINE CULTURE/COLONY COUNT: CPT

## 2022-11-11 PROCEDURE — 85610 PROTHROMBIN TIME: CPT

## 2022-11-11 PROCEDURE — 96361 HYDRATE IV INFUSION ADD-ON: CPT

## 2022-11-11 PROCEDURE — 85025 COMPLETE CBC W/AUTO DIFF WBC: CPT

## 2022-11-11 PROCEDURE — 2580000003 HC RX 258

## 2022-11-11 PROCEDURE — 96374 THER/PROPH/DIAG INJ IV PUSH: CPT

## 2022-11-11 PROCEDURE — G0480 DRUG TEST DEF 1-7 CLASSES: HCPCS

## 2022-11-11 PROCEDURE — 2060000000 HC ICU INTERMEDIATE R&B

## 2022-11-11 PROCEDURE — 36415 COLL VENOUS BLD VENIPUNCTURE: CPT

## 2022-11-11 PROCEDURE — 6370000000 HC RX 637 (ALT 250 FOR IP)

## 2022-11-11 PROCEDURE — 85652 RBC SED RATE AUTOMATED: CPT

## 2022-11-11 PROCEDURE — 73700 CT LOWER EXTREMITY W/O DYE: CPT

## 2022-11-11 PROCEDURE — 82550 ASSAY OF CK (CPK): CPT

## 2022-11-11 PROCEDURE — 84550 ASSAY OF BLOOD/URIC ACID: CPT

## 2022-11-11 PROCEDURE — 71045 X-RAY EXAM CHEST 1 VIEW: CPT

## 2022-11-11 PROCEDURE — 87635 SARS-COV-2 COVID-19 AMP PRB: CPT

## 2022-11-11 PROCEDURE — 93005 ELECTROCARDIOGRAM TRACING: CPT

## 2022-11-11 RX ORDER — ACETAMINOPHEN 500 MG
1000 TABLET ORAL ONCE
Status: COMPLETED | OUTPATIENT
Start: 2022-11-11 | End: 2022-11-11

## 2022-11-11 RX ORDER — 0.9 % SODIUM CHLORIDE 0.9 %
1000 INTRAVENOUS SOLUTION INTRAVENOUS ONCE
Status: COMPLETED | OUTPATIENT
Start: 2022-11-11 | End: 2022-11-11

## 2022-11-11 RX ORDER — HYDROCODONE BITARTRATE AND ACETAMINOPHEN 5; 325 MG/1; MG/1
1 TABLET ORAL ONCE
Status: COMPLETED | OUTPATIENT
Start: 2022-11-11 | End: 2022-11-11

## 2022-11-11 RX ADMIN — ACETAMINOPHEN 1000 MG: 500 TABLET ORAL at 18:49

## 2022-11-11 RX ADMIN — CEFTRIAXONE SODIUM 1000 MG: 1 INJECTION, POWDER, FOR SOLUTION INTRAMUSCULAR; INTRAVENOUS at 21:15

## 2022-11-11 RX ADMIN — HYDROCODONE BITARTRATE AND ACETAMINOPHEN 1 TABLET: 5; 325 TABLET ORAL at 23:58

## 2022-11-11 RX ADMIN — Medication 1500 MG: at 23:16

## 2022-11-11 RX ADMIN — SODIUM CHLORIDE 1000 ML: 9 INJECTION, SOLUTION INTRAVENOUS at 18:48

## 2022-11-11 ASSESSMENT — ENCOUNTER SYMPTOMS
DIARRHEA: 0
BACK PAIN: 1
SORE THROAT: 0
VOMITING: 0
WHEEZING: 0
COUGH: 0
EYE DISCHARGE: 0
NAUSEA: 0

## 2022-11-11 ASSESSMENT — PAIN - FUNCTIONAL ASSESSMENT: PAIN_FUNCTIONAL_ASSESSMENT: 0-10

## 2022-11-11 ASSESSMENT — PAIN SCALES - GENERAL
PAINLEVEL_OUTOF10: 10
PAINLEVEL_OUTOF10: 6

## 2022-11-11 NOTE — ED TRIAGE NOTES
Patient presented to the ED with Rt ankle pain, states it was an injury that may have lead to an infection. Patient has neem assessed by the resident and physician, orders to be followed as directed.

## 2022-11-11 NOTE — ED PROVIDER NOTES
I performed a history and physical examination of the patient and discussed management with the resident. I reviewed the residents note and agree with the documented findings and plan of care. Any areas of disagreement are noted on the chart. I was personally present for the key portions of any procedures. I have documented in the chart those procedures where I was not present during the key portions. I have reviewed the emergency nurses triage note. I agree with the chief complaint, past medical history, past surgical history, allergies, medications, social and family history as documented unless otherwise noted below. Documentation of the HPI, Physical Exam and Medical Decision Making performed by medical students or scribes is based on my personal performance of the HPI, PE and MDM. For Phys Assistant/ Nurse Practitioner cases/documentation I have personally evaluated this patient and have completed at least one if not all key elements of the E/M (history, physical exam, and MDM). I find the patient's history and physical exam are consistent with the NP/PA documentation. I agree with the care provided, treatment rendered, disposition and followup plan. Additional findings are as noted. Luz Maria Bae. Rachelle Allred MD  Attending Emergency  Physician        This patient presented to the emergency department with complaints of generalized pain and weakness for the past 5 days. He reports that he \"slipped\" while walking in his home on the day of the onset of his symptoms. He states he did not fall or strike his head. He denies any loss of consciousness. No focal numbness, weakness, tingling. No disturbance of bowel or bladder function. Denies a cough or sore throat. No shortness of breath. No vomiting or diarrhea. He was evaluated at [facility] emergency department. CBC, BMP, and radiographs of the left foot, ankle, tib-fib were obtained at that time. .  I reviewed the radiology studies as well as the results of those labs. Note is made of mild leukocytosis as well as presence of some nonspecific calcifications near the dorsal distal aspect of the talonavicular bone. Awake, alert, cooperative, responsive. Speech fluent, normal comprehension. Lungs clear bilaterally. No rales, rhonchi, wheezes, stridor, retractions. Cardiac-S1S2, RRR, no murmur, rub, or gallop. Abdomen soft, nondistended, nontender. No organomegaly, mass, bruit. Normal bowel sounds. Patient has diffuse tenderness over the paracervical and trapezius muscles. No significant midline tenderness. No meningismus. Examination of the left lower extremity specifically the foot and ankle demonstrates swelling and ecchymosis over the lateral aspect of the ankle and the midfoot. There is no deformity or crepitus noted. No tenderness over either malleolus. Distal sensation, capillary refill, motor function are preserved. No proximal fibular tenderness. Note is made of the patient's fever. Impression: 1. Possible avulsion fracture of the left talonavicular. 2.  Diffuse tenderness and fever. Plan: We will provide antipyretics as well as check lab work including inflammatory markers. We will also obtain a CT of the foot to rule out occult fracture. Patient will be reassessed. Polo Houston MD  11/11/22 1700      Patient was signed out to Dr. Alfa Jalloh at the completion of my shift.      Polo Houston MD  11/11/22 8133

## 2022-11-11 NOTE — ED PROVIDER NOTES
Faculty Sign-Out Attestation  Handoff taken on the following patient from prior Attending Physician: Delta Palmer    I was available and discussed any additional care issues that arose and coordinated the management plans with the resident(s) caring for the patient during my duty period. Any areas of disagreement with residents documentation of care or procedures are noted on the chart. I was personally present for the key portions of any/all procedures during my duty period. I have documented in the chart those procedures where I was not present during the key portions. 80-year-old male presenting with slip x2 on Monday, was seen in outside hospital and had x-rays that were negative. Having pain and weakness everywhere, worse in the left foot/ankle. Febrile today. Leukocytosis on labs drawn at prior outside hospital visit. Repeating imaging, obtaining labs. Michael Romero MD  Attending Physician        Michael Romero MD  11/11/22 1844    Sepsis Times and Checklist  Vital Signs: BP: (!) 163/78  Heart Rate: 70  Resp: 18  Temp: (!) 100.8 °F (38.2 °C) SpO2: 95 %  SIRS (>2)   Temp > 38.3C or < 36C   HR > 90   RR > 20   WBC > 12 or < 4 or >10% bands  SIRS (>2) and confirmed or suspected source of infection = Sepsis    Sepsis Identified   Date: 11/11/22  Time: 18:31   Sepsis Orders:   CBC: Yes   CMP: Yes   PT/PTT: Yes   Blood Cultures x2: Yes   Urinalysis and Urine Culture: Yes   Lactate: Yes   Broad Spectrum Antibiotics Given (within 3 hours of sepsis identification, after blood cultures): Yes              IV Crystalloid given: Yes and 1L NS    If lactate >2.0 MUST repeat within 6 hours    Is lactate > 4.0:  No  If lactate >4.0 OR hypotension 30ml/kg crystalloid MUST be ordered. Fluids must be completed within 3 hours of sepsis identification. 20:51 EKG: Interpreted by myself: Sinus tachycardia at 105 bpm.  Right bundle branch block, left anterior fascicular block. No ST segment elevation or depression. Nonspecific EKG.            Leonie Heath MD  11/11/22 2405

## 2022-11-11 NOTE — ED PROVIDER NOTES
[facility]  ED  Emergency Department Encounter  Emergency Medicine Resident     Pt Name:Mark Rodrigues  MRN: [id number]  [place] 1950  Date of evaluation: 11/11/22  PCP:  Brandie Yin MD      CHIEF COMPLAINT       Left foot pain and generalized lower extremitiy pain      HISTORY OF PRESENT ILLNESS  (Location/Symptom, Timing/Onset, Context/Setting, Quality, Duration, Modifying Factors, Severity.)      Johanna Plascencia is a 70 y.o. male who presents with 5 day history of left foot pain and pain in the lower extremities bilaterally. States he is unable to walk and is in extreme pain. He states that his pain initially began in his left foot after slipping and falling twice on it (once on Monday and again on Tuesday). He notes that he was seen at [address] [place] [address] and told he has an infection. Review of chart shows that he was diagnosed with cellulitis and discharged with doxycycline. The patient states that he also has pain in his lower extremities \"all over\" and the pain he feels has been worsening since. He reports no changes in urination or defecation, no recent illnesses, no loss of consciousness during his falls. He states that he is experienced at night, but has had no headaches, lightheadedness, chest pain, abdominal pain, nausea, vomiting, diarrhea. He does state that he has had relatively poor intake, stating he only had a bit to drink, and has not eaten since Wednesday. He also states that he smokes marijuana every day    PAST MEDICAL / SURGICAL / SOCIAL / FAMILY HISTORY      has a past medical history of Abnormal EKG, Bladder tumor, CAD (coronary artery disease), Clinical trial participant at discharge, COPD (chronic obstructive pulmonary disease) ([facility] [address] Depression, GERD (gastroesophageal reflux disease), Hearing loss, History of blood transfusion, Hyperlipidemia, Hypertension, Panic attacks, Prostate hypertrophy, Under care of team, and Wellness examination.        has a past surgical history that includes Tonsillectomy and adenoidectomy (child); Cystoscopy; lipoma resection (child); Cystoscopy (01/15/2016); Coronary artery bypass graft (03/27/2016); Cardiac defibrillator placement (09/22/2016); hernia repair (Right); bladder tumor excision (01/15/2016); Cystocopy (10/26/2022); and Cystoscopy (N/A, 10/26/2022). Social History     Socioeconomic History    Marital status:      Spouse name: Not on file    Number of children: 1    Years of education: Not on file    Highest education level: Not on file   Occupational History    Occupation: [employer]   Tobacco Use    Smoking status: Former     Packs/day: 0.50     Types: Cigars, Cigarettes     Start date: 1/8/1966    Smokeless tobacco: Former     Types: Snuff, Chew     Quit date: 3/23/2016   Vaping Use    Vaping Use: Never used   Substance and Sexual Activity    Alcohol use: Yes     Alcohol/week: 0.0 standard drinks     Comment: occasionally    Drug use: Yes     Types: Marijuana Gerda Forte)     Comment: last time 10/20/22    Sexual activity: Not on file   Other Topics Concern    Not on file   Social History Narrative    Not on file     Social Determinants of Health     Financial Resource Strain: Not on file   Food Insecurity: Not on file   Transportation Needs: Not on file   Physical Activity: Not on file   Stress: Not on file   Social Connections: Not on file   Intimate Partner Violence: Not on file   Housing Stability: Not on file       Family History   Problem Relation Age of Onset    Other Mother         blind    Cancer Father         leukemia       Allergies:  Patient has no known allergies. Home Medications:  Prior to Admission medications    Medication Sig Start Date End Date Taking?  Authorizing Provider   clopidogrel (PLAVIX) 75 MG tablet Take 1 tablet by mouth daily Hold for 5 days after procedure 10/26/22   Silvia Reed PA-C   vilazodone HCl (VIIBRYD) 20 MG TABS Take 20 mg by mouth daily 9/19/20   Historical Provider, MD   Fluticasone furoate-vilanterol (BREO ELLIPTA) 200-25 MCG/INH AEPB inhaler Take 200 mcg by mouth 12/10/18   Historical Provider, MD   gabapentin (NEURONTIN) 100 MG capsule Take 100 mg by mouth 3 times daily. Historical Provider, MD   fenofibrate (TRICOR) 48 MG tablet Take 48 mg by mouth daily    Historical Provider, MD   lisinopril (PRINIVIL;ZESTRIL) 2.5 MG tablet Take 2.5 mg by mouth daily    Historical Provider, MD   traZODone (DESYREL) 150 MG tablet TAKE ONE TABLET BY MOUTH ONCE NIGHTLY 11/27/17   Rebeca Zayas MD   atorvastatin (LIPITOR) 40 MG tablet TAKE ONE TABLET BY MOUTH ONCE NIGHTLY 11/27/17   Rebeca Zayas MD   metoprolol tartrate (LOPRESSOR) 25 MG tablet TAKE ONE-HALF TABLET BY MOUTH TWO TIMES A DAY 11/27/17   Rebeca Zayas MD   buPROPion (WELLBUTRIN XL) 150 MG extended release tablet TAKE ONE TABLET BY MOUTH EVERY MORNING  Patient taking differently: Take 450 mg by mouth every morning 9/21/17   Rebeca Zayas MD   hydrOXYzine (ATARAX) 25 MG tablet Take 25 mg by mouth 3 times daily as needed    Historical Provider, MD   potassium chloride (MICRO-K) 10 MEQ CR capsule Take 10 mEq by mouth 2 times daily    Historical Provider, MD   furosemide (LASIX) 20 MG tablet Take 40 mg by mouth daily     Historical Provider, MD       REVIEW OF SYSTEMS    (2-9 systems for level 4, 10 or more for level 5)      Review of Systems   Constitutional:  Positive for activity change, appetite change and chills. Negative for fever. Patient states they have not eaten in 2 days, and have drunk very little as well. They report chills. But do not report a fever. HENT:  Negative for drooling and sore throat. Eyes:  Negative for discharge and visual disturbance. Respiratory:  Negative for cough and wheezing. Cardiovascular:  Positive for leg swelling (Unilateral leg swelling on the left foot. ). Negative for chest pain. Gastrointestinal:  Negative for diarrhea, nausea and vomiting.    Endocrine: Negative for polyuria. Genitourinary:  Negative for decreased urine volume and difficulty urinating. Musculoskeletal:  Positive for arthralgias, back pain, gait problem and myalgias. Skin:  Negative for rash and wound. Allergic/Immunologic: Negative for environmental allergies and food allergies. Neurological:  Positive for weakness. Negative for light-headedness and numbness. Psychiatric/Behavioral:  Negative for self-injury and suicidal ideas. PHYSICAL EXAM   (up to 7 for level 4, 8 or more for level 5)      INITIAL VITALS:   BP (!) 163/78   Pulse 70   Temp (!) 100.8 °F (38.2 °C) (Oral)   Resp 18   Wt 209 lb (94.8 kg)   SpO2 95%   BMI 30.86 kg/m²     Physical Exam  Constitutional:       Appearance: He is obese. He is ill-appearing. HENT:      Head: Normocephalic. Right Ear: External ear normal.      Left Ear: External ear normal.      Nose: Nose normal.      Mouth/Throat:      Mouth: Mucous membranes are moist.   Eyes:      Extraocular Movements: Extraocular movements intact. Pupils: Pupils are equal, round, and reactive to light. Cardiovascular:      Rate and Rhythm: Normal rate and regular rhythm. Pulses: Normal pulses. Heart sounds: Normal heart sounds. Pulmonary:      Effort: Pulmonary effort is normal. No respiratory distress. Breath sounds: Normal breath sounds. No stridor. No wheezing. Abdominal:      General: Abdomen is flat. There is no distension. Tenderness: There is no abdominal tenderness. Musculoskeletal:      Comments: Patient complaining of diffuse arthralgias and myalgias. In the lower extremities. He states that he is unable to ambulate,. On physical exam he has reduced strength in both lower extremities but limited by pain as well as reduced range of motion limited by pain. No loss of sensation. The left foot is significantly swollen at the ankle, with minor erythema noted.   Any attempt to touch the lower extremities is met with significant tenderness and pain. Skin:     General: Skin is warm. Capillary Refill: Capillary refill takes less than 2 seconds. Neurological:      General: No focal deficit present. Mental Status: He is alert and oriented to person, place, and time.    Psychiatric:         Mood and Affect: Mood normal.         Behavior: Behavior normal.       DIFFERENTIAL  DIAGNOSIS     PLAN (LABS / IMAGING / EKG):  Orders Placed This Encounter   Procedures    COVID-19, Rapid    RAPID INFLUENZA A/B ANTIGENS    Blood Culture 1    Culture, Blood 2    Culture, Urine    Culture, Urine    CT FOOT LEFT WO CONTRAST    XR CHEST PORTABLE    CMP    CBC with Auto Differential    C-Reactive Protein    Sedimentation Rate    Lactate, Sepsis    Procalcitonin    Urinalysis with Reflex to Culture    Protime-INR    APTT    Microscopic Urinalysis    CK    TSH with Reflex    Ethanol    Uric Acid    Neurologic Status Assessment    Strict intake and output    Vital Signs Per Unit Routine    Inpatient consult to Hospitalist    EKG 12 Lead    Saline lock IV    ADMIT TO INPATIENT       MEDICATIONS ORDERED:  Orders Placed This Encounter   Medications    acetaminophen (TYLENOL) tablet 1,000 mg    0.9 % sodium chloride bolus    HYDROcodone-acetaminophen (NORCO) 5-325 MG per tablet 1 tablet    vancomycin (VANCOCIN) 1500 mg in sodium chloride 0.9 % 250 mL IVPB     Order Specific Question:   Antimicrobial Indications     Answer:   Skin and Soft Tissue Infection     Order Specific Question:   Skin duration of therapy     Answer:   7 days    cefTRIAXone (ROCEPHIN) 1,000 mg in sodium chloride 0.9 % 50 mL IVPB mini-bag     Order Specific Question:   Antimicrobial Indications     Answer:   Skin and Soft Tissue Infection       DDX: Viral infection causing arthralgias, left foot cellulitis, COVID-19, influenza,    DIAGNOSTIC RESULTS / EMERGENCY DEPARTMENT COURSE / MDM   LAB RESULTS:  Results for orders placed or performed during the hospital encounter of 11/11/22   COVID-19, Rapid    Specimen: Nasopharyngeal Swab   Result Value Ref Range    Specimen Description . NASOPHARYNGEAL SWAB     SARS-CoV-2, Rapid Not Detected Not Detected   RAPID INFLUENZA A/B ANTIGENS    Specimen: Nasopharyngeal   Result Value Ref Range    Flu A Antigen NEGATIVE NEGATIVE    Flu B Antigen NEGATIVE NEGATIVE   Blood Culture 1    Specimen: Blood   Result Value Ref Range    Specimen Description . BLOOD     Culture NO GROWTH <24 HRS    Culture, Blood 2    Specimen: Blood   Result Value Ref Range    Specimen Description . BLOOD     Special Requests  R AC 10 ML     Culture NO GROWTH <24 HRS    CMP   Result Value Ref Range    Glucose 130 (H) 70 - 99 mg/dL    BUN 15 8 - 23 mg/dL    Creatinine 1.03 0.70 - 1.20 mg/dL    Est, Glom Filt Rate >60 >60 mL/min/1.73m2    Calcium 9.8 8.6 - 10.4 mg/dL    Sodium 142 135 - 144 mmol/L    Potassium 4.0 3.7 - 5.3 mmol/L    Chloride 103 98 - 107 mmol/L    CO2 26 20 - 31 mmol/L    Anion Gap 13 9 - 17 mmol/L    Alkaline Phosphatase 89 40 - 129 U/L    ALT 8 5 - 41 U/L    AST 14 <40 U/L    Total Bilirubin 0.8 0.3 - 1.2 mg/dL    Total Protein 7.7 6.4 - 8.3 g/dL    Albumin 3.7 3.5 - 5.2 g/dL    Albumin/Globulin Ratio 0.9 (L) 1.0 - 2.5   CBC with Auto Differential   Result Value Ref Range    WBC 17.7 (H) 3.5 - 11.3 k/uL    RBC 5.56 4.21 - 5.77 m/uL    Hemoglobin 13.3 13.0 - 17.0 g/dL    Hematocrit 42.5 40.7 - 50.3 %    MCV 76.4 (L) 82.6 - 102.9 fL    MCH 23.9 (L) 25.2 - 33.5 pg    MCHC 31.3 28.4 - 34.8 g/dL    RDW 16.6 (H) 11.8 - 14.4 %    Platelets 401 (H) 569 - 453 k/uL    MPV 10.1 8.1 - 13.5 fL    NRBC Automated 0.0 0.0 per 100 WBC    Immature Granulocytes 0 0 %    Seg Neutrophils 88 (H) 36 - 66 %    Lymphocytes 3 (L) 24 - 44 %    Monocytes 9 (H) 1 - 7 %    Eosinophils % 0 (L) 1 - 4 %    Basophils 0 0 - 2 %    Absolute Immature Granulocyte 0.00 0.00 - 0.30 k/uL    Segs Absolute 15.58 (H) 1.8 - 7.7 k/uL    Absolute Lymph # 0.53 (L) 1.0 - 4.8 k/uL    Absolute Mono # 1.59 (H) 0.1 - 0.8 k/uL    Absolute Eos # 0.00 0.0 - 0.4 k/uL    Basophils Absolute 0.00 0.0 - 0.2 k/uL    Morphology ANISOCYTOSIS PRESENT     Morphology MICROCYTOSIS PRESENT     Morphology 1+ ELLIPTOCYTES    C-Reactive Protein   Result Value Ref Range    .1 (H) 0.0 - 5.0 mg/L   Sedimentation Rate   Result Value Ref Range    Sed Rate 117 (H) 0 - 20 mm/Hr   Lactate, Sepsis   Result Value Ref Range    Lactic Acid, Sepsis, Whole Blood 1.8 0.5 - 1.9 mmol/L   Lactate, Sepsis   Result Value Ref Range    Lactic Acid, Sepsis, Whole Blood 1.1 0.5 - 1.9 mmol/L   Procalcitonin   Result Value Ref Range    Procalcitonin 0.22 (H) <0.09 ng/mL   Urinalysis with Reflex to Culture    Specimen: Urine   Result Value Ref Range    Color, UA Dark Yellow (A) Yellow    Turbidity UA Clear Clear    Glucose, Ur NEGATIVE NEGATIVE    Bilirubin Urine NEGATIVE NEGATIVE    Ketones, Urine TRACE (A) NEGATIVE    Specific Gravity, UA 1.027 1.005 - 1.030    Urine Hgb TRACE (A) NEGATIVE    pH, UA 5.0 5.0 - 8.0    Protein, UA 1+ (A) NEGATIVE    Urobilinogen, Urine Normal Normal    Nitrite, Urine NEGATIVE NEGATIVE    Leukocyte Esterase, Urine SMALL (A) NEGATIVE   Protime-INR   Result Value Ref Range    Protime 11.9 9.1 - 12.3 sec    INR 1.1    APTT   Result Value Ref Range    PTT 25.5 20.5 - 30.5 sec   Microscopic Urinalysis   Result Value Ref Range    WBC, UA 10 TO 20 0 - 5 /HPF    RBC, UA 2 TO 5 0 - 2 /HPF    Epithelial Cells UA 0 TO 2 0 - 5 /HPF    Bacteria, UA FEW (A) None       IMPRESSION: Patient meets SIRS criteria with elevated temperature, as well as white blood cell count. Will initiate sepsis protocol. Increased CRP as well as sed rate suggest infectious pathology. Patient tested negative for flu and COVID-19. Patient has microcytic anemia, And thrombocytosis. RADIOLOGY:  XR CHEST PORTABLE   Final Result   No acute cardiopulmonary findings. CT FOOT LEFT WO CONTRAST   Final Result   1. No acute osseous abnormality.   Normal midfoot alignment. 2. Nonspecific subcutaneous edema. No drainable fluid collection or soft   tissue gas. EKG  none  All EKG's are interpreted by the Emergency Department Physician who either signs or Co-signs this chart in the absence of a cardiologist.    EMERGENCY DEPARTMENT COURSE:      ED Course as of 11/11/22 2248 Fri Nov 11, 2022   1649 Diffuse arthralgias and myalgia noted on Physical exam, will check for covid and flu, possible viral etiology. Review of prior visit showed mildly elevated WBC count. Fluids ordered given patient stating reduced intake   [MZ]   1657 CT scan ordered following review of prior x-rays showing calcification dorsal distal of  periNavicular joint. [MZ]   1812 Temp(!): 100.8 °F (38.2 °C) [MZ]   1833 WBC(!): 17.7 [MZ]   1900 Given temp and WBC count, Sepsis protocol initiated [MZ]   2000 CRP(!): 391.1 [MZ]   2000 Sed Rate(!): 117 [MZ]   2001 CT FOOT LEFT WO CONTRAST      IMPRESSION:  1. No acute osseous abnormality. Normal midfoot alignment. 2. Nonspecific subcutaneous edema. No drainable fluid collection or soft  tissue gas. [MZ]   2040 XR CHEST PORTABLE  IMPRESSION:  No acute cardiopulmonary findings.  [MZ]   2059 Lactic Acid, Sepsis, Whole Blood: 1.8 [MZ]   2059 Procalcitonin(!): 0.22 [MZ]      ED Course User Index  [MZ] Kasandra Calvo MD     Sepsis Times and Checklist  Vital Signs: BP: (!) 163/78  Heart Rate: 70  Resp: 18  Temp: (!) 100.8 °F (38.2 °C) SpO2: 95 %  SIRS (>2) Non Pregnant       Temp > 38.3C or < 36C   HR > 90   RR > 20   WBC > 12 or < 4 or >10% bands  SIRS (>2) Pregnant 20 weeks until 3 days postpartum   Temp > 38C or <36C   HR >110   RR > 24   WBC >15 or < 4 or >10% bands   SIRS (>2) and confirmed or suspected source of infection = Sepsis  Is Sepsis due to likely bacterial infection?: Cellulitis/skin      Sepsis Identified:  Date: 11/11/2022   Time: 1831  Sepsis Orders:   CBC(required): Yes   CMP: Yes   PT/PTT: Yes   Blood Cultures x2(required): Yes   Urinalysis and Urine Culture: Yes   Lactate(required): Yes   Antibiotics Given (within 3 hours of sepsis identification, after blood cultures):  Patient currently taking Doxycycline. Began Ceftriaxone and Vancomycin for suspected skin infection source. (If unable to obtain IV access for IV antibiotics within 3 hours of identification of sepsis, IM antibiotics is acceptable.)              If lactate >2.0 MUST repeat within 6 hours    If elevated, is elevated lactate from a likely infectious source?:  Lactate not elevated . IV Fluid Bolus:  Is lactate > 4.0:  No  If lactate >  4.0 OR hypotension (MAP<65 mmHg,BP<90 or SBP<85 pregnancy 20 weeks to 3 days  postpartum) (with 2 BP readings) 30 ml/kg crystalloid MUST be ordered. [place] for this amount of fluid must be placed)     Fluids must be initiated within 3 hours of sepsis identification. These fluids must have a rate > 125 ml/hr. Is the patient Morbidly Obese (BMI > 30):  Patient was given 1000 mL bolus of normal saline. Lactate was not above 4 nor did patient meet criteria for 30 mL/kg bolus requirements and therefore was not given more fluid before admission. IV Fluids given prior to arrival can be used in this calculation but the following information must be documented:  Start time: 1649, Type of fluid NS, Volume of fluid 1000, and Rate (Duration or End time) 2000. Does the patient or patient advocate refuse the entire 30 ml/kg IV fluid bolus? No        No notes of EC Admission Criteria type on file. PROCEDURES:  none    CONSULTS:  IP CONSULT TO HOSPITALIST  PHARMACY TO DOSE VANCOMYCIN    CRITICAL CARE:  none    FINAL IMPRESSION    This is a 66-year-old male who presented to the emergency department with pain in his left foot as well as pain in his lower extremities that was causing significant distress. Work-up revealed and history as well as chart review showed possible sepsis requiring admission for further work-up.  Positive SIRS criteria, diagnosis of cellulits, generalized pain, fever. Consult with intermed led to admission  1. Sepsis without acute organ dysfunction, due to unspecified organism [facility])          DISPOSITION / [address] Admitted 11/11/2022 09:37:40 PM      PATIENT REFERRED TO:  No follow-up provider specified.     DISCHARGE MEDICATIONS:  New Prescriptions    No medications on file       Sandra Longoria MD  Emergency Medicine Resident    (Please note that portions of thisnote were completed with a voice recognition program.  Efforts were made to edit the dictations but occasionally words are mis-transcribed.)       Sandra Longoria MD  Resident  11/11/22 025

## 2022-11-12 ENCOUNTER — APPOINTMENT (OUTPATIENT)
Dept: GENERAL RADIOLOGY | Age: 72
DRG: 553 | End: 2022-11-12
Payer: MEDICARE

## 2022-11-12 PROBLEM — R71.8 MICROCYTOSIS: Status: ACTIVE | Noted: 2022-11-12
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
## 2022-11-12 PROBLEM — M00.9 SEPTIC ARTHRITIS OF LEFT ANKLE (HCC): Status: ACTIVE | Noted: 2022-11-12

## 2022-11-12 PROBLEM — M13.10 MONOARTICULAR ARTHRITIS: Status: ACTIVE | Noted: 2022-11-12

## 2022-11-12 PROBLEM — M25.572 ACUTE LEFT ANKLE PAIN: Status: ACTIVE | Noted: 2022-11-12

## 2022-11-12 PROBLEM — R29.898 LEFT LEG WEAKNESS: Status: ACTIVE | Noted: 2022-11-12

## 2022-11-12 PROBLEM — I10 PRIMARY HYPERTENSION: Status: ACTIVE | Noted: 2022-11-12

## 2022-11-12 PROBLEM — J44.9 COPD (CHRONIC OBSTRUCTIVE PULMONARY DISEASE) (HCC): Status: ACTIVE | Noted: 2022-11-12

## 2022-11-12 PROBLEM — E66.811 CLASS 1 OBESITY DUE TO EXCESS CALORIES WITH SERIOUS COMORBIDITY AND BODY MASS INDEX (BMI) OF 31.0 TO 31.9 IN ADULT: Status: ACTIVE | Noted: 2022-11-12

## 2022-11-12 PROBLEM — I25.5 ISCHEMIC CARDIOMYOPATHY: Status: ACTIVE | Noted: 2022-11-12

## 2022-11-12 PROBLEM — E66.09 CLASS 1 OBESITY DUE TO EXCESS CALORIES WITH SERIOUS COMORBIDITY AND BODY MASS INDEX (BMI) OF 31.0 TO 31.9 IN ADULT: Status: ACTIVE | Noted: 2022-11-12

## 2022-11-12 PROBLEM — M25.472 LEFT ANKLE EFFUSION: Status: ACTIVE | Noted: 2022-11-12

## 2022-11-12 LAB
ALBUMIN SERPL-MCNC: 2.6 G/DL (ref 3.5–5.2)
ALBUMIN/GLOBULIN RATIO: 0.7 (ref 1–2.5)
ALP BLD-CCNC: 70 U/L (ref 40–129)
ALT SERPL-CCNC: <5 U/L (ref 5–41)
ANION GAP SERPL CALCULATED.3IONS-SCNC: 11 MMOL/L (ref 9–17)
AST SERPL-CCNC: 11 U/L
BILIRUB SERPL-MCNC: 0.6 MG/DL (ref 0.3–1.2)
BUN BLDV-MCNC: 16 MG/DL (ref 8–23)
CALCIUM SERPL-MCNC: 8.7 MG/DL (ref 8.6–10.4)
CHLORIDE BLD-SCNC: 105 MMOL/L (ref 98–107)
CO2: 22 MMOL/L (ref 20–31)
CREAT SERPL-MCNC: 0.89 MG/DL (ref 0.7–1.2)
CULTURE: NO GROWTH
CULTURE: NORMAL
EKG ATRIAL RATE: 105 BPM
EKG P-R INTERVAL: 146 MS
EKG Q-T INTERVAL: 400 MS
EKG QRS DURATION: 126 MS
EKG QTC CALCULATION (BAZETT): 528 MS
EKG R AXIS: -56 DEGREES
EKG T AXIS: 55 DEGREES
EKG VENTRICULAR RATE: 105 BPM
ETHANOL PERCENT: <0.01 %
ETHANOL: <10 MG/DL
FERRITIN: 166 NG/ML (ref 30–400)
GFR SERPL CREATININE-BSD FRML MDRD: >60 ML/MIN/1.73M2
GLUCOSE BLD-MCNC: 102 MG/DL (ref 70–99)
INR BLD: 1.2
IRON SATURATION: 7 % (ref 20–55)
IRON: 18 UG/DL (ref 59–158)
POTASSIUM SERPL-SCNC: 3.6 MMOL/L (ref 3.7–5.3)
PROTHROMBIN TIME: 12.5 SEC (ref 9.1–12.3)
SODIUM BLD-SCNC: 138 MMOL/L (ref 135–144)
SPECIMEN DESCRIPTION: NORMAL
SPECIMEN DESCRIPTION: NORMAL
TOTAL CK: 57 U/L (ref 39–308)
TOTAL IRON BINDING CAPACITY: 274 UG/DL (ref 250–450)
TOTAL PROTEIN: 6.3 G/DL (ref 6.4–8.3)
TSH SERPL DL<=0.05 MIU/L-ACNC: 0.36 UIU/ML (ref 0.3–5)
UNSATURATED IRON BINDING CAPACITY: 256 UG/DL (ref 112–347)
URIC ACID: 5.6 MG/DL (ref 3.4–7)
VANCOMYCIN RANDOM: 12 UG/ML

## 2022-11-12 PROCEDURE — 87641 MR-STAPH DNA AMP PROBE: CPT

## 2022-11-12 PROCEDURE — 87075 CULTR BACTERIA EXCEPT BLOOD: CPT

## 2022-11-12 PROCEDURE — 89051 BODY FLUID CELL COUNT: CPT

## 2022-11-12 PROCEDURE — 2060000000 HC ICU INTERMEDIATE R&B

## 2022-11-12 PROCEDURE — 73630 X-RAY EXAM OF FOOT: CPT

## 2022-11-12 PROCEDURE — 6370000000 HC RX 637 (ALT 250 FOR IP): Performed by: INTERNAL MEDICINE

## 2022-11-12 PROCEDURE — 94640 AIRWAY INHALATION TREATMENT: CPT

## 2022-11-12 PROCEDURE — 99232 SBSQ HOSP IP/OBS MODERATE 35: CPT | Performed by: INTERNAL MEDICINE

## 2022-11-12 PROCEDURE — 2580000003 HC RX 258: Performed by: INTERNAL MEDICINE

## 2022-11-12 PROCEDURE — 87205 SMEAR GRAM STAIN: CPT

## 2022-11-12 PROCEDURE — 83550 IRON BINDING TEST: CPT

## 2022-11-12 PROCEDURE — 87070 CULTURE OTHR SPECIMN AEROBIC: CPT

## 2022-11-12 PROCEDURE — 82728 ASSAY OF FERRITIN: CPT

## 2022-11-12 PROCEDURE — 0S9G3ZX DRAINAGE OF LEFT ANKLE JOINT, PERCUTANEOUS APPROACH, DIAGNOSTIC: ICD-10-PCS | Performed by: ORTHOPAEDIC SURGERY

## 2022-11-12 PROCEDURE — 36415 COLL VENOUS BLD VENIPUNCTURE: CPT

## 2022-11-12 PROCEDURE — G0103 PSA SCREENING: HCPCS

## 2022-11-12 PROCEDURE — 99221 1ST HOSP IP/OBS SF/LOW 40: CPT | Performed by: INTERNAL MEDICINE

## 2022-11-12 PROCEDURE — 85610 PROTHROMBIN TIME: CPT

## 2022-11-12 PROCEDURE — 6370000000 HC RX 637 (ALT 250 FOR IP): Performed by: NURSE PRACTITIONER

## 2022-11-12 PROCEDURE — 89060 EXAM SYNOVIAL FLUID CRYSTALS: CPT

## 2022-11-12 PROCEDURE — 73610 X-RAY EXAM OF ANKLE: CPT

## 2022-11-12 PROCEDURE — 99231 SBSQ HOSP IP/OBS SF/LOW 25: CPT | Performed by: ORTHOPAEDIC SURGERY

## 2022-11-12 PROCEDURE — 80202 ASSAY OF VANCOMYCIN: CPT

## 2022-11-12 PROCEDURE — 83540 ASSAY OF IRON: CPT

## 2022-11-12 PROCEDURE — 6360000002 HC RX W HCPCS: Performed by: NURSE PRACTITIONER

## 2022-11-12 PROCEDURE — 80053 COMPREHEN METABOLIC PANEL: CPT

## 2022-11-12 PROCEDURE — 87040 BLOOD CULTURE FOR BACTERIA: CPT

## 2022-11-12 PROCEDURE — 2580000003 HC RX 258: Performed by: NURSE PRACTITIONER

## 2022-11-12 PROCEDURE — 6360000002 HC RX W HCPCS: Performed by: INTERNAL MEDICINE

## 2022-11-12 PROCEDURE — 94761 N-INVAS EAR/PLS OXIMETRY MLT: CPT

## 2022-11-12 RX ORDER — GABAPENTIN 100 MG/1
100 CAPSULE ORAL 3 TIMES DAILY
Status: DISCONTINUED | OUTPATIENT
Start: 2022-11-13 | End: 2022-11-18 | Stop reason: HOSPADM

## 2022-11-12 RX ORDER — SENNA AND DOCUSATE SODIUM 50; 8.6 MG/1; MG/1
2 TABLET, FILM COATED ORAL DAILY
Status: DISCONTINUED | OUTPATIENT
Start: 2022-11-12 | End: 2022-11-18 | Stop reason: HOSPADM

## 2022-11-12 RX ORDER — SODIUM CHLORIDE 0.9 % (FLUSH) 0.9 %
5-40 SYRINGE (ML) INJECTION PRN
Status: DISCONTINUED | OUTPATIENT
Start: 2022-11-12 | End: 2022-11-18 | Stop reason: HOSPADM

## 2022-11-12 RX ORDER — ACETAMINOPHEN 650 MG/1
650 SUPPOSITORY RECTAL EVERY 6 HOURS PRN
Status: DISCONTINUED | OUTPATIENT
Start: 2022-11-12 | End: 2022-11-18 | Stop reason: HOSPADM

## 2022-11-12 RX ORDER — FUROSEMIDE 40 MG/1
40 TABLET ORAL DAILY
Status: DISCONTINUED | OUTPATIENT
Start: 2022-11-12 | End: 2022-11-18 | Stop reason: HOSPADM

## 2022-11-12 RX ORDER — SODIUM CHLORIDE 0.9 % (FLUSH) 0.9 %
5-40 SYRINGE (ML) INJECTION EVERY 12 HOURS SCHEDULED
Status: DISCONTINUED | OUTPATIENT
Start: 2022-11-12 | End: 2022-11-18 | Stop reason: HOSPADM

## 2022-11-12 RX ORDER — FENOFIBRATE 54 MG/1
54 TABLET ORAL DAILY
Status: DISCONTINUED | OUTPATIENT
Start: 2022-11-12 | End: 2022-11-16

## 2022-11-12 RX ORDER — CLOPIDOGREL BISULFATE 75 MG/1
75 TABLET ORAL DAILY
Status: DISCONTINUED | OUTPATIENT
Start: 2022-11-12 | End: 2022-11-18 | Stop reason: HOSPADM

## 2022-11-12 RX ORDER — OXYCODONE HYDROCHLORIDE 5 MG/1
5 TABLET ORAL EVERY 4 HOURS PRN
Status: DISCONTINUED | OUTPATIENT
Start: 2022-11-12 | End: 2022-11-16

## 2022-11-12 RX ORDER — ACETAMINOPHEN 325 MG/1
650 TABLET ORAL EVERY 6 HOURS PRN
Status: DISCONTINUED | OUTPATIENT
Start: 2022-11-12 | End: 2022-11-18 | Stop reason: HOSPADM

## 2022-11-12 RX ORDER — SODIUM CHLORIDE 9 MG/ML
INJECTION, SOLUTION INTRAVENOUS PRN
Status: DISCONTINUED | OUTPATIENT
Start: 2022-11-12 | End: 2022-11-18 | Stop reason: HOSPADM

## 2022-11-12 RX ORDER — GABAPENTIN 100 MG/1
100 CAPSULE ORAL 3 TIMES DAILY
Status: DISCONTINUED | OUTPATIENT
Start: 2022-11-12 | End: 2022-11-12

## 2022-11-12 RX ORDER — ARIPIPRAZOLE 10 MG/1
10 TABLET ORAL DAILY
Status: DISCONTINUED | OUTPATIENT
Start: 2022-11-12 | End: 2022-11-18 | Stop reason: HOSPADM

## 2022-11-12 RX ORDER — LORAZEPAM 1 MG/1
1 TABLET ORAL ONCE
Status: DISCONTINUED | OUTPATIENT
Start: 2022-11-12 | End: 2022-11-13

## 2022-11-12 RX ORDER — ENOXAPARIN SODIUM 100 MG/ML
40 INJECTION SUBCUTANEOUS DAILY
Status: DISCONTINUED | OUTPATIENT
Start: 2022-11-12 | End: 2022-11-18 | Stop reason: HOSPADM

## 2022-11-12 RX ORDER — POTASSIUM CHLORIDE 750 MG/1
10 CAPSULE, EXTENDED RELEASE ORAL 2 TIMES DAILY
Status: DISCONTINUED | OUTPATIENT
Start: 2022-11-12 | End: 2022-11-12

## 2022-11-12 RX ORDER — ATORVASTATIN CALCIUM 40 MG/1
40 TABLET, FILM COATED ORAL NIGHTLY
Status: DISCONTINUED | OUTPATIENT
Start: 2022-11-12 | End: 2022-11-18 | Stop reason: HOSPADM

## 2022-11-12 RX ORDER — BUPROPION HYDROCHLORIDE 150 MG/1
450 TABLET ORAL EVERY MORNING
Status: DISCONTINUED | OUTPATIENT
Start: 2022-11-12 | End: 2022-11-18 | Stop reason: HOSPADM

## 2022-11-12 RX ORDER — HYDROXYZINE HYDROCHLORIDE 25 MG/1
25 TABLET, FILM COATED ORAL 3 TIMES DAILY PRN
Status: DISCONTINUED | OUTPATIENT
Start: 2022-11-12 | End: 2022-11-16

## 2022-11-12 RX ORDER — VILAZODONE HYDROCHLORIDE 20 MG/1
20 TABLET ORAL DAILY
Status: DISCONTINUED | OUTPATIENT
Start: 2022-11-12 | End: 2022-11-18 | Stop reason: HOSPADM

## 2022-11-12 RX ORDER — LISINOPRIL 2.5 MG/1
2.5 TABLET ORAL DAILY
Status: DISCONTINUED | OUTPATIENT
Start: 2022-11-12 | End: 2022-11-18 | Stop reason: HOSPADM

## 2022-11-12 RX ORDER — BUDESONIDE AND FORMOTEROL FUMARATE DIHYDRATE 160; 4.5 UG/1; UG/1
2 AEROSOL RESPIRATORY (INHALATION) 2 TIMES DAILY
Status: DISCONTINUED | OUTPATIENT
Start: 2022-11-12 | End: 2022-11-18 | Stop reason: HOSPADM

## 2022-11-12 RX ORDER — ARIPIPRAZOLE 10 MG/1
10 TABLET ORAL DAILY
COMMUNITY

## 2022-11-12 RX ORDER — ALBUTEROL SULFATE 2.5 MG/3ML
2.5 SOLUTION RESPIRATORY (INHALATION)
Status: DISCONTINUED | OUTPATIENT
Start: 2022-11-12 | End: 2022-11-18 | Stop reason: HOSPADM

## 2022-11-12 RX ADMIN — TRAZODONE HYDROCHLORIDE 150 MG: 50 TABLET ORAL at 01:57

## 2022-11-12 RX ADMIN — SODIUM CHLORIDE 20 ML/HR: 9 INJECTION, SOLUTION INTRAVENOUS at 02:44

## 2022-11-12 RX ADMIN — Medication 1000 MG: at 15:40

## 2022-11-12 RX ADMIN — BUDESONIDE AND FORMOTEROL FUMARATE DIHYDRATE 2 PUFF: 160; 4.5 AEROSOL RESPIRATORY (INHALATION) at 19:52

## 2022-11-12 RX ADMIN — PIPERACILLIN AND TAZOBACTAM 4500 MG: 4; .5 INJECTION, POWDER, FOR SOLUTION INTRAVENOUS at 04:00

## 2022-11-12 RX ADMIN — OXYCODONE 5 MG: 5 TABLET ORAL at 11:01

## 2022-11-12 RX ADMIN — DESMOPRESSIN ACETATE 40 MG: 0.2 TABLET ORAL at 19:21

## 2022-11-12 RX ADMIN — SODIUM CHLORIDE, PRESERVATIVE FREE 10 ML: 5 INJECTION INTRAVENOUS at 09:15

## 2022-11-12 RX ADMIN — METOPROLOL TARTRATE 25 MG: 25 TABLET ORAL at 01:57

## 2022-11-12 RX ADMIN — DESMOPRESSIN ACETATE 40 MG: 0.2 TABLET ORAL at 01:57

## 2022-11-12 RX ADMIN — HYDROXYZINE HYDROCHLORIDE 25 MG: 25 TABLET ORAL at 01:58

## 2022-11-12 RX ADMIN — METOPROLOL TARTRATE 25 MG: 25 TABLET ORAL at 09:15

## 2022-11-12 RX ADMIN — HYDROXYZINE HYDROCHLORIDE 25 MG: 25 TABLET ORAL at 19:21

## 2022-11-12 RX ADMIN — OXYCODONE 5 MG: 5 TABLET ORAL at 15:36

## 2022-11-12 RX ADMIN — VANCOMYCIN HYDROCHLORIDE 750 MG: 10 INJECTION, POWDER, LYOPHILIZED, FOR SOLUTION INTRAVENOUS at 02:45

## 2022-11-12 RX ADMIN — PIPERACILLIN AND TAZOBACTAM 4500 MG: 4; .5 INJECTION, POWDER, FOR SOLUTION INTRAVENOUS at 09:21

## 2022-11-12 RX ADMIN — VILAZODONE HYDROCHLORIDE 20 MG: 20 TABLET ORAL at 09:15

## 2022-11-12 RX ADMIN — LISINOPRIL 2.5 MG: 2.5 TABLET ORAL at 09:00

## 2022-11-12 RX ADMIN — DOCUSATE SODIUM 50 MG AND SENNOSIDES 8.6 MG 2 TABLET: 8.6; 5 TABLET, FILM COATED ORAL at 09:15

## 2022-11-12 RX ADMIN — CLOPIDOGREL 75 MG: 75 TABLET, FILM COATED ORAL at 09:15

## 2022-11-12 RX ADMIN — OXYCODONE 5 MG: 5 TABLET ORAL at 21:34

## 2022-11-12 RX ADMIN — OXYCODONE 5 MG: 5 TABLET ORAL at 06:04

## 2022-11-12 RX ADMIN — OXYCODONE 5 MG: 5 TABLET ORAL at 01:57

## 2022-11-12 RX ADMIN — SODIUM CHLORIDE, PRESERVATIVE FREE 10 ML: 5 INJECTION INTRAVENOUS at 19:21

## 2022-11-12 RX ADMIN — ARIPIPRAZOLE 10 MG: 10 TABLET ORAL at 15:38

## 2022-11-12 RX ADMIN — BUDESONIDE AND FORMOTEROL FUMARATE DIHYDRATE 2 PUFF: 160; 4.5 AEROSOL RESPIRATORY (INHALATION) at 07:43

## 2022-11-12 RX ADMIN — METOPROLOL TARTRATE 25 MG: 25 TABLET ORAL at 19:21

## 2022-11-12 RX ADMIN — BUPROPION HYDROCHLORIDE 450 MG: 150 TABLET, EXTENDED RELEASE ORAL at 09:15

## 2022-11-12 SDOH — ECONOMIC STABILITY: TRANSPORTATION INSECURITY
IN THE PAST 12 MONTHS, HAS LACK OF TRANSPORTATION KEPT YOU FROM MEETINGS, WORK, OR FROM GETTING THINGS NEEDED FOR DAILY LIVING?: NO

## 2022-11-12 SDOH — HEALTH STABILITY: MENTAL HEALTH
STRESS IS WHEN SOMEONE FEELS TENSE, NERVOUS, ANXIOUS, OR CAN'T SLEEP AT NIGHT BECAUSE THEIR MIND IS TROUBLED. HOW STRESSED ARE YOU?: VERY MUCH

## 2022-11-12 SDOH — SOCIAL STABILITY: SOCIAL NETWORK
DO YOU BELONG TO ANY CLUBS OR ORGANIZATIONS SUCH AS CHURCH GROUPS UNIONS, FRATERNAL OR ATHLETIC GROUPS, OR SCHOOL GROUPS?: NO

## 2022-11-12 SDOH — SOCIAL STABILITY: SOCIAL NETWORK: IN A TYPICAL WEEK, HOW MANY TIMES DO YOU TALK ON THE PHONE WITH FAMILY, FRIENDS, OR NEIGHBORS?: NEVER

## 2022-11-12 SDOH — ECONOMIC STABILITY: HOUSING INSECURITY
IN THE LAST 12 MONTHS, WAS THERE A TIME WHEN YOU DID NOT HAVE A STEADY PLACE TO SLEEP OR SLEPT IN A SHELTER (INCLUDING NOW)?: NO

## 2022-11-12 SDOH — SOCIAL STABILITY: SOCIAL NETWORK: HOW OFTEN DO YOU GET TOGETHER WITH FRIENDS OR RELATIVES?: MORE THAN THREE TIMES A WEEK

## 2022-11-12 SDOH — ECONOMIC STABILITY: FOOD INSECURITY: WITHIN THE PAST 12 MONTHS, YOU WORRIED THAT YOUR FOOD WOULD RUN OUT BEFORE YOU GOT MONEY TO BUY MORE.: NEVER TRUE

## 2022-11-12 SDOH — HEALTH STABILITY: MENTAL HEALTH: HOW MANY STANDARD DRINKS CONTAINING ALCOHOL DO YOU HAVE ON A TYPICAL DAY?: 1 OR 2

## 2022-11-12 SDOH — ECONOMIC STABILITY: FOOD INSECURITY: WITHIN THE PAST 12 MONTHS, THE FOOD YOU BOUGHT JUST DIDN'T LAST AND YOU DIDN'T HAVE MONEY TO GET MORE.: NEVER TRUE

## 2022-11-12 SDOH — ECONOMIC STABILITY: INCOME INSECURITY: HOW HARD IS IT FOR YOU TO PAY FOR THE VERY BASICS LIKE FOOD, HOUSING, MEDICAL CARE, AND HEATING?: NOT HARD AT ALL

## 2022-11-12 SDOH — SOCIAL STABILITY: SOCIAL INSECURITY
WITHIN THE LAST YEAR, HAVE TO BEEN RAPED OR FORCED TO HAVE ANY KIND OF SEXUAL ACTIVITY BY YOUR PARTNER OR EX-PARTNER?: NO

## 2022-11-12 SDOH — SOCIAL STABILITY: SOCIAL INSECURITY: WITHIN THE LAST YEAR, HAVE YOU BEEN HUMILIATED OR EMOTIONALLY ABUSED IN OTHER WAYS BY YOUR PARTNER OR EX-PARTNER?: NO

## 2022-11-12 SDOH — SOCIAL STABILITY: SOCIAL NETWORK: HOW OFTEN DO YOU ATTENT MEETINGS OF THE CLUB OR ORGANIZATION YOU BELONG TO?: NEVER

## 2022-11-12 SDOH — SOCIAL STABILITY: SOCIAL INSECURITY
WITHIN THE LAST YEAR, HAVE YOU BEEN KICKED, HIT, SLAPPED, OR OTHERWISE PHYSICALLY HURT BY YOUR PARTNER OR EX-PARTNER?: NO

## 2022-11-12 SDOH — SOCIAL STABILITY: SOCIAL INSECURITY: WITHIN THE LAST YEAR, HAVE YOU BEEN AFRAID OF YOUR PARTNER OR EX-PARTNER?: NO

## 2022-11-12 SDOH — HEALTH STABILITY: PHYSICAL HEALTH: ON AVERAGE, HOW MANY MINUTES DO YOU ENGAGE IN EXERCISE AT THIS LEVEL?: 30 MIN

## 2022-11-12 SDOH — SOCIAL STABILITY: SOCIAL NETWORK: HOW OFTEN DO YOU ATTEND CHURCH OR RELIGIOUS SERVICES?: NEVER

## 2022-11-12 SDOH — HEALTH STABILITY: PHYSICAL HEALTH: ON AVERAGE, HOW MANY DAYS PER WEEK DO YOU ENGAGE IN MODERATE TO STRENUOUS EXERCISE (LIKE A BRISK WALK)?: 3 DAYS

## 2022-11-12 SDOH — HEALTH STABILITY: MENTAL HEALTH: HOW OFTEN DO YOU HAVE A DRINK CONTAINING ALCOHOL?: 4 OR MORE TIMES A WEEK

## 2022-11-12 SDOH — ECONOMIC STABILITY: INCOME INSECURITY: IN THE LAST 12 MONTHS, WAS THERE A TIME WHEN YOU WERE NOT ABLE TO PAY THE MORTGAGE OR RENT ON TIME?: NO

## 2022-11-12 SDOH — ECONOMIC STABILITY: TRANSPORTATION INSECURITY
IN THE PAST 12 MONTHS, HAS THE LACK OF TRANSPORTATION KEPT YOU FROM MEDICAL APPOINTMENTS OR FROM GETTING MEDICATIONS?: NO

## 2022-11-12 SDOH — SOCIAL STABILITY: SOCIAL NETWORK: ARE YOU MARRIED, WIDOWED, DIVORCED, SEPARATED, NEVER MARRIED, OR LIVING WITH A PARTNER?: DIVORCED

## 2022-11-12 ASSESSMENT — PAIN DESCRIPTION - ORIENTATION
ORIENTATION: LEFT
ORIENTATION: LEFT;RIGHT

## 2022-11-12 ASSESSMENT — PAIN DESCRIPTION - DESCRIPTORS
DESCRIPTORS: DISCOMFORT;NUMBNESS;SHARP
DESCRIPTORS: DISCOMFORT;ACHING
DESCRIPTORS: ACHING;DISCOMFORT
DESCRIPTORS: DISCOMFORT;ACHING

## 2022-11-12 ASSESSMENT — PAIN SCALES - GENERAL
PAINLEVEL_OUTOF10: 10
PAINLEVEL_OUTOF10: 9

## 2022-11-12 ASSESSMENT — PAIN DESCRIPTION - PAIN TYPE: TYPE: ACUTE PAIN

## 2022-11-12 ASSESSMENT — PAIN DESCRIPTION - FREQUENCY: FREQUENCY: CONTINUOUS

## 2022-11-12 ASSESSMENT — PAIN - FUNCTIONAL ASSESSMENT: PAIN_FUNCTIONAL_ASSESSMENT: PREVENTS OR INTERFERES SOME ACTIVE ACTIVITIES AND ADLS

## 2022-11-12 ASSESSMENT — PAIN DESCRIPTION - LOCATION
LOCATION: FOOT
LOCATION: FOOT;ANKLE
LOCATION: FOOT
LOCATION: FOOT;LEG

## 2022-11-12 ASSESSMENT — PAIN DESCRIPTION - ONSET: ONSET: ON-GOING

## 2022-11-12 NOTE — CONSULTS
Orthopaedic Surgery Consult  (Dr. Segundo Arredondo)      CC/Reason for consult: Left ankle pain    HPI:      The patient is a 70 y.o. right hand-dominant male who presents for left ankle pain that started on Monday night after he rolled it. Patient presented to [address] [place] [address] ED where they ruled out a fracture and started him on antibiotics. Patient does not know name of antibiotics but states he has been compliant in taking them for the past 2 days. He presented to [facility] ED because the pain continued. Pain is does radiate up to the calf. Patient has been able to ambulate on it. He normally uses a walker as an assistive device. [facility] has started the patient on Zosyn and vancomycin. They have also ordered a CT lumbar spine due to a history of chronic back pain and shooting pains from the back into the right leg. Patient denies nay current fevers or chills. Patient has an extensive cardiac history with stents and an AICD placed in 2016. Patient is on daily ASA and Plavix. Patient has a history of psoriasis, hypertension, lumbar back pain. He denies any previous orthopedic history other than a possible broken arm when he was 3years old. Patient used to be a 3 pack/day smoker but quit more than 10 years ago and denies any IV drug use. Patient denies any history of gout or prior infections in the ankle. He does have history of bladder tumor removed through cystoscopy 1 week ago. Orthopedics was consulted to rule out septic arthritis.       Past Medical History:    Past Medical History:   Diagnosis Date    Abnormal EKG     anterior fascicular block    Bladder tumor     CAD (coronary artery disease)     Clinical trial participant at discharge 03/24/2016    disqualified 3/24/2016    COPD (chronic obstructive pulmonary disease) (HCC)     Depression     GERD (gastroesophageal reflux disease)     Hearing loss     History of blood transfusion     Hyperlipidemia     Hypertension 2012    Panic attacks     Prostate hypertrophy     Under care of team     Dr. Theresa Fisher, cardiology    Wellness examination     -PCP seen in Dec. 2019     Past Surgical History:    Past Surgical History:   Procedure Laterality Date    BLADDER TUMOR EXCISION  01/15/2016    TURB with gyrus    CARDIAC DEFIBRILLATOR PLACEMENT  09/22/2016    CORONARY ARTERY BYPASS GRAFT  03/27/2016    X 4 Emergent    CORONARY ARTERY BYPASS GRAFT  04/01/2016    CYSTOSCOPY      CYSTOSCOPY  01/15/2016    CYSTOSCOPY  10/26/2022    CYSTOSCOPY BLADDER BIOPSY, FULGARATION    CYSTOSCOPY N/A 10/26/2022    CYSTOSCOPY BLADDER BIOPSY, FULGARATION performed by Opal Walter MD at [place] [id number] Right     LIPOMA RESECTION  child    anterior neck    TONSILLECTOMY AND ADENOIDECTOMY  child     Medications Prior to Admission:   Prior to Admission medications    Medication Sig Start Date End Date Taking? Authorizing Provider   ARIPiprazole (ABILIFY) 10 MG tablet Take 10 mg by mouth daily   Yes Historical Provider, MD   clopidogrel (PLAVIX) 75 MG tablet Take 1 tablet by mouth daily Hold for 5 days after procedure 10/26/22   Silvia Reed PA-C   vilazodone HCl (VIIBRYD) 20 MG TABS Take 20 mg by mouth daily 9/19/20   Historical Provider, MD   Fluticasone furoate-vilanterol (BREO ELLIPTA) 200-25 MCG/INH AEPB inhaler Take 200 mcg by mouth 12/10/18   Historical Provider, MD   gabapentin (NEURONTIN) 100 MG capsule Take 100 mg by mouth 3 times daily.      Historical Provider, MD   fenofibrate (TRICOR) 48 MG tablet Take 48 mg by mouth daily    Historical Provider, MD   lisinopril (PRINIVIL;ZESTRIL) 2.5 MG tablet Take 2.5 mg by mouth daily    Historical Provider, MD   traZODone (DESYREL) 150 MG tablet TAKE ONE TABLET BY MOUTH ONCE NIGHTLY 11/27/17   Jovanna Cruz MD   atorvastatin (LIPITOR) 40 MG tablet TAKE ONE TABLET BY MOUTH ONCE NIGHTLY 11/27/17   Jovanna Cruz MD   metoprolol tartrate (LOPRESSOR) 25 MG tablet TAKE ONE-HALF TABLET BY MOUTH TWO TIMES A DAY 11/27/17 Andrews Paulino MD   buPROPion (WELLBUTRIN XL) 150 MG extended release tablet TAKE ONE TABLET BY MOUTH EVERY MORNING  Patient taking differently: Take 450 mg by mouth every morning 17   Andrews Paulino MD   hydrOXYzine (ATARAX) 25 MG tablet Take 25 mg by mouth 3 times daily as needed    Historical Provider, MD   potassium chloride (MICRO-K) 10 MEQ CR capsule Take 10 mEq by mouth 2 times daily    Historical Provider, MD   furosemide (LASIX) 20 MG tablet Take 40 mg by mouth daily     Historical Provider, MD     Allergies:    Patient has no known allergies. Social History:   Social History     Socioeconomic History    Marital status:      Spouse name: None    Number of children: 1    Years of education: None    Highest education level: None   Occupational History    Occupation: [employer] man   Tobacco Use    Smoking status: Former     Packs/day: 0.50     Types: [de-identified], Cigarettes     Start date: 1966     Quit date: 2021     Years since quittin.0    Smokeless tobacco: Former     Types: Snuff, Coletta Shown     Quit date: 3/23/2016   Vaping Use    Vaping Use: Never used   Substance and Sexual Activity    Alcohol use: Yes     Alcohol/week: 7.0 standard drinks     Types: 7 Cans of beer per week     Comment: occasionally    Drug use: Yes     Types: Marijuana Aloma Kris)     Social Determinants of Health     Financial Resource Strain: Low Risk     Difficulty of Paying Living Expenses: Not hard at all   Food Insecurity: No Food Insecurity    Worried About [facility] in the Last Year: Never true    Ran Out of Food in the Last Year: Never true   Transportation Needs: No Transportation Needs    Lack of Transportation (Medical): No    Lack of Transportation (Non-Medical):  No   Physical Activity: Insufficiently Active    Days of Exercise per Week: 3 days    Minutes of Exercise per Session: 30 min   Stress: Stress Concern Present    Feeling of Stress : Very much   Social Connections: Socially Isolated Frequency of Communication with Friends and Family: Never    Frequency of Social Gatherings with Friends and Family: More than three times a week    Attends [employer] Services: Never    Active Member of Clubs or Organizations: No    Attends Club or Organization Meetings: Never    Marital Status:    Intimate Partner Violence: Not At Risk    Fear of Current or Ex-Partner: No    Emotionally Abused: No    Physically Abused: No    Sexually Abused: No   Housing Stability: Unknown    Unable to Pay for Housing in the Last Year: No    Unstable Housing in the Last Year: No     Family History:  Family History   Problem Relation Age of Onset    Other Mother         blind    Heart Surgery Mother     Cancer Father         leukemia       ROS:   Constitutional: Negative for fever and chills. Respiratory: Negative for cough. Cardiovascular: Negative for chest pain. Musculoskeletal: Positive for left ankle pain, right leg and lumbar back pain. Skin: Negative for itching and rash. Neurological: Negative for numbness, tingling, weakness. PE:  Blood pressure 123/73, pulse 75, temperature 98 °F (36.7 °C), temperature source Oral, resp. rate 15, height 5' 9\" (1.753 m), weight 210 lb 1.6 oz (95.3 kg), SpO2 93 %. Gen: Alert and oriented, NAD, cooperative. Head: Normocephalic, atraumatic. Cardiovascular: Regular rate. Respiratory: Chest symmetric, no accessory muscle use. RLE: Skin intact. No ecchymoses, abrasions, deformity, or lacerations. No significant erythema seen. Non tender to palpation. Compartments soft and easily compressible. EHL/FHL/TA/GS complex motor intact. Sural/saphenous/SPN/DPN/plantar nerve distribution SILT. Foot and toes are warm and well perfused. LLE: Skin intact. No ecchymoses, abrasions, deformity, or lacerations. Mild erythema about the ankle and dorsum of foot. Tender to palpation diffusely about the ankle. Compartments soft and easily compressible.  No pain with calf compression. Minimal ankle plantar and dorsiflexion due to edema and pain but EHL/FHL/TA/GS complex motor intact. Significant pain with passive dorsiflexion/plantar flexion. Sural/saphenous/SPN/DPN/plantar nerve distribution SILT. Foot and toes are warm and well perfused. Labs:  Recent Labs     11/11/22  1818 11/11/22  2042 11/12/22  0219   WBC 17.7*  --   --    HGB 13.3  --   --    HCT 42.5  --   --    *  --   --    INR  --    < > 1.2     --  138   K 4.0  --  3.6*   BUN 15  --  16   CREATININE 1.03  --  0.89   GLUCOSE 130*  --  102*   SEDRATE 117*  --   --    .1*  --   --     < > = values in this interval not displayed. Imaging:   CT LEFT FOOT 11/11/22:  1. No acute osseous abnormality. Normal midfoot alignment. 2. Nonspecific subcutaneous edema. No drainable fluid collection or soft   tissue gas. There is chondrocalcinosis noted at the ankle joint on CT. Some fluid noted in the talotibial joint. Assessment/Plan: 70 y.o. male who is being seen for:    -R/o left ankle septic arthritis    -Recommend left ankle aspiration to rule out infection in the joint.   -Left ankle aspirated without complication  -Cultures sent for evaluation for infection. If positive recommend I&D in the OR. -CRP: 391, ESR: 117. Will continue to trend. -WBAT LLE  -Diet: NPO   -Abx: Zosyn, Vancomycin  -DVT ppx: Lovenox, plavix  -Pain control and medical management per primary  -Strict ice and elevate extremity for pain and swelling  -Please contact Ortho on call with any questions        Violeta Adames, DO  Orthopedic Surgery Resident, PGY-1  [address]      PGY-2 Addendum    Patient seen and examined. Agree with above assessment by Dr. Bradley Adames. Note adjusted as needed.      Gwendlyn Hamman,   Orthopedic Surgery Resident, PGY-2  [facility]

## 2022-11-12 NOTE — H&P
[facility]  Office: [address], DO, Aixa Bal, DO, Buck Coreas, DO, Kyle Herman Blood, DO, Ramon Witt MD, Mack Dancer, MD, Abhilash Pena MD, John Garcia MD,  Teddy Trujillo MD, Dee Cervantes MD, Chris Palacios, DO, Hemant Croft MD,  Quin Ortiz MD, Julio César Godfrey MD, Adrianna Pruett, DO, Alejandro Owens MD, Roberto Whitmore MD, Fortunato Palmer, DO, Rambo Jenkins MD, Christine Alvarez MD, Sherice Morse MD, Derik Hardwick MD, Tiffanie Mike, DO, Parker Perez MD, Sary Smith MD, Deanna Damico, CNP,  Isabel Garcia, CNP, Brett Hubbard, CNP, Sarwat Fisher, CNP,  Gretchen Adams, DNP, Laz Barker, CNP, Selene Chowdhury, CNP, Bryan aVrgas, CNP, James Eduardo, CNP, Jere Rosa, CNP, Rodrick Bey PA-C, Edy Cervantes, CNS, Steve Garibay, DNP, Ira Christina, CNP, Adamaris Steve, CNP, Unknown Pump, CNP         [address] [facility]    HISTORY AND PHYSICAL EXAMINATION            Date:   11/11/2022  Patient name:  Jose G Gilliland  Date of admission:  11/11/2022  3:49 PM  MRN:   [id number]  Account:  [de-identified]  YOB: 1950  PCP:    Mary Jimenez MD  Room:   [facility]  Code Status:    Prior    Chief Complaint:     Chief Complaint   Patient presents with    Ankle Pain         History of Present Illness:     Jose G Gilliland is a 70 y.o. caucation male with underlying DJD, benign bladder tumors, COPD, BPH and CABG who presented with intractable left ankle pain, left leg weakness with reduced ROM from hip down. Patient reports he was putting his grandson to sleep in the evening and turned left to walk out the room when he felt immediate sharp lower back pain which caused him to loose balance and roll his ankle. He felt instant pain 10/10. He sat down and tried to wait until it subsided but by the morning he couldn't take the pain anymore. He went to [facility] who worked him up.  XR didn't reveal any sylvia. Past Medical History:     Past Medical History:   Diagnosis Date    Abnormal EKG     anterior fascicular block    Bladder tumor     CAD (coronary artery disease)     Clinical trial participant at discharge 03/24/2016    disqualified 3/24/2016    COPD (chronic obstructive pulmonary disease) ([address]     Depression     GERD (gastroesophageal reflux disease)     Hearing loss     History of blood transfusion     Hyperlipidemia     Hypertension 2012    Panic attacks     Prostate hypertrophy     Under care of team     Dr. Claude Curiel, cardiology    Wellness examination     -PCP seen in Dec. 2019        Past Surgical History:     Past Surgical History:   Procedure Laterality Date    BLADDER TUMOR EXCISION  01/15/2016    TURB with gyrus    CARDIAC DEFIBRILLATOR PLACEMENT  09/22/2016    CORONARY ARTERY BYPASS GRAFT  03/27/2016    X 4 Emergent    CYSTOSCOPY      CYSTOSCOPY  01/15/2016    CYSTOSCOPY  10/26/2022    CYSTOSCOPY BLADDER BIOPSY, FULGARATION    CYSTOSCOPY N/A 10/26/2022    CYSTOSCOPY BLADDER BIOPSY, FULGARATION performed by Paris Allen MD at [place] [id number] Right     LIPOMA RESECTION  child    anterior neck    TONSILLECTOMY AND ADENOIDECTOMY  child        Medications Prior to Admission:     Prior to Admission medications    Medication Sig Start Date End Date Taking? Authorizing Provider   clopidogrel (PLAVIX) 75 MG tablet Take 1 tablet by mouth daily Hold for 5 days after procedure 10/26/22   Silvia Reed PA-C   vilazodone HCl (VIIBRYD) 20 MG TABS Take 20 mg by mouth daily 9/19/20   Historical Provider, MD   Fluticasone furoate-vilanterol (BREO ELLIPTA) 200-25 MCG/INH AEPB inhaler Take 200 mcg by mouth 12/10/18   Historical Provider, MD   gabapentin (NEURONTIN) 100 MG capsule Take 100 mg by mouth 3 times daily.      Historical Provider, MD   fenofibrate (TRICOR) 48 MG tablet Take 48 mg by mouth daily    Historical Provider, MD   lisinopril (PRINIVIL;ZESTRIL) 2.5 MG tablet Take 2.5 mg by mouth daily    Historical Provider, MD   traZODone (DESYREL) 150 MG tablet TAKE ONE TABLET BY MOUTH ONCE NIGHTLY 17   Brandi Faulkner MD   atorvastatin (LIPITOR) 40 MG tablet TAKE ONE TABLET BY MOUTH ONCE NIGHTLY 17   Brandi Faulkner MD   metoprolol tartrate (LOPRESSOR) 25 MG tablet TAKE ONE-HALF TABLET BY MOUTH TWO TIMES A DAY 17   Brandi Faulkner MD   buPROPion (WELLBUTRIN XL) 150 MG extended release tablet TAKE ONE TABLET BY MOUTH EVERY MORNING  Patient taking differently: Take 450 mg by mouth every morning 17   Brandi Faulkner MD   hydrOXYzine (ATARAX) 25 MG tablet Take 25 mg by mouth 3 times daily as needed    Historical Provider, MD   potassium chloride (MICRO-K) 10 MEQ CR capsule Take 10 mEq by mouth 2 times daily    Historical Provider, MD   furosemide (LASIX) 20 MG tablet Take 40 mg by mouth daily     Historical Provider, MD        Allergies:     Patient has no known allergies. Social History:     Tobacco:    reports that he has quit smoking. His smoking use included cigars and cigarettes. He started smoking about 56 years ago. He smoked an average of .5 packs per day. He quit smokeless tobacco use about 6 years ago. His smokeless tobacco use included snuff and chew. Alcohol:      reports current alcohol use. Drug Use:  reports current drug use. Drug: Marijuana Littlejohn Fogo). Family History:     Family History   Problem Relation Age of Onset    Other Mother         blind    Cancer Father         leukemia       Review of Systems:     Positive and Negative as described in HPI. ROS as per above reported     Physical Exam:   BP (!) 163/78   Pulse 70   Temp (!) 100.8 °F (38.2 °C) (Oral)   Resp 18   Wt 209 lb (94.8 kg)   SpO2 95%   BMI 30.86 kg/m²   Temp (24hrs), Av.8 °F (38.2 °C), Min:100.8 °F (38.2 °C), Max:100.8 °F (38.2 °C)    No results for input(s): POCGLU in the last 72 hours.   No intake or output data in the 24 hours ending 22 2220    General Appearance: AO3, pleasant, speaking in complete sentences, appears slightly disheveled. Alert and cooperative. In pain but less since ED arrival. Skin is dry and pale. Mental status: oriented to person, place, and time  Head: normocephalic, atraumatic  Eye: no icterus, redness, pupils equal and reactive, extraocular eye movements intact, conjunctiva clear  Ear: normal external ear, no discharge, hearing intact  Nose: no drainage noted  Mouth: mucous membranes dry. Poor oral hygiene without signs of thrush or lesions. Neck: supple, no carotid bruits, thyroid not tender   Lungs: Bilateral equal air entry, clear to ausculation, no wheezing, rales or rhonchi, normal effort  Cardiovascular: normal rate, regular rhythm, no murmur, gallop, rub  Abdomen: Soft, nontender, nondistended, normal bowel sounds  Neurologic: There are no new focal motor or sensory deficits, normal muscle tone and bulk, no abnormal sensation, normal speech, cranial nerves II through XII grossly intact  Extremities: reduced ROM of left leg. Poor hip flexion, sensation intact. . peripheral pulses palpable, No calf pain with palpation. Limbs are warm to touch. No prominent erythema with distinct borders. Left ankle swelling, tender to touch keila at medial malleolus region. Appers to have some skin color change that is pink , possibly early bruising vs soft tissue infection. Left ankle significantly warm to touch. Right leg non edematous, no tender focal points on right. Mild paraspinal tenderness, no cva tenderness.    Psych: normal affect    Investigations:      Laboratory Testing:  Recent Results (from the past 24 hour(s))   RAPID INFLUENZA A/B ANTIGENS    Collection Time: 11/11/22  5:00 PM    Specimen: Nasopharyngeal   Result Value Ref Range    Flu A Antigen NEGATIVE NEGATIVE    Flu B Antigen NEGATIVE NEGATIVE   COVID-19, Rapid    Collection Time: 11/11/22  5:33 PM    Specimen: Nasopharyngeal Swab   Result Value Ref Range    Specimen Description [place] NASOPHARYNGEAL SWAB     SARS-CoV-2, Rapid Not Detected Not Detected   CMP    Collection Time: 11/11/22  6:18 PM   Result Value Ref Range    Glucose 130 (H) 70 - 99 mg/dL    BUN 15 8 - 23 mg/dL    Creatinine 1.03 0.70 - 1.20 mg/dL    Est, Glom Filt Rate >60 >60 mL/min/1.73m2    Calcium 9.8 8.6 - 10.4 mg/dL    Sodium 142 135 - 144 mmol/L    Potassium 4.0 3.7 - 5.3 mmol/L    Chloride 103 98 - 107 mmol/L    CO2 26 20 - 31 mmol/L    Anion Gap 13 9 - 17 mmol/L    Alkaline Phosphatase 89 40 - 129 U/L    ALT 8 5 - 41 U/L    AST 14 <40 U/L    Total Bilirubin 0.8 0.3 - 1.2 mg/dL    Total Protein 7.7 6.4 - 8.3 g/dL    Albumin 3.7 3.5 - 5.2 g/dL    Albumin/Globulin Ratio 0.9 (L) 1.0 - 2.5   CBC with Auto Differential    Collection Time: 11/11/22  6:18 PM   Result Value Ref Range    WBC 17.7 (H) 3.5 - 11.3 k/uL    RBC 5.56 4.21 - 5.77 m/uL    Hemoglobin 13.3 13.0 - 17.0 g/dL    Hematocrit 42.5 40.7 - 50.3 %    MCV 76.4 (L) 82.6 - 102.9 fL    MCH 23.9 (L) 25.2 - 33.5 pg    MCHC 31.3 28.4 - 34.8 g/dL    RDW 16.6 (H) 11.8 - 14.4 %    Platelets 183 (H) 329 - 453 k/uL    MPV 10.1 8.1 - 13.5 fL    NRBC Automated 0.0 0.0 per 100 WBC    Immature Granulocytes 0 0 %    Seg Neutrophils 88 (H) 36 - 66 %    Lymphocytes 3 (L) 24 - 44 %    Monocytes 9 (H) 1 - 7 %    Eosinophils % 0 (L) 1 - 4 %    Basophils 0 0 - 2 %    Absolute Immature Granulocyte 0.00 0.00 - 0.30 k/uL    Segs Absolute 15.58 (H) 1.8 - 7.7 k/uL    Absolute Lymph # 0.53 (L) 1.0 - 4.8 k/uL    Absolute Mono # 1.59 (H) 0.1 - 0.8 k/uL    Absolute Eos # 0.00 0.0 - 0.4 k/uL    Basophils Absolute 0.00 0.0 - 0.2 k/uL    Morphology ANISOCYTOSIS PRESENT     Morphology MICROCYTOSIS PRESENT     Morphology 1+ ELLIPTOCYTES    C-Reactive Protein    Collection Time: 11/11/22  6:18 PM   Result Value Ref Range    .1 (H) 0.0 - 5.0 mg/L   Sedimentation Rate    Collection Time: 11/11/22  6:18 PM   Result Value Ref Range    Sed Rate 117 (H) 0 - 20 mm/Hr   Blood Culture 1    Collection Time: 11/11/22  8:40 PM    Specimen: Blood   Result Value Ref Range    Specimen Description . BLOOD     Culture NO GROWTH <24 HRS    Lactate, Sepsis    Collection Time: 11/11/22  8:42 PM   Result Value Ref Range    Lactic Acid, Sepsis, Whole Blood 1.8 0.5 - 1.9 mmol/L   Procalcitonin    Collection Time: 11/11/22  8:42 PM   Result Value Ref Range    Procalcitonin 0.22 (H) <0.09 ng/mL   Protime-INR    Collection Time: 11/11/22  8:42 PM   Result Value Ref Range    Protime 11.9 9.1 - 12.3 sec    INR 1.1    APTT    Collection Time: 11/11/22  8:42 PM   Result Value Ref Range    PTT 25.5 20.5 - 30.5 sec   Culture, Blood 2    Collection Time: 11/11/22  9:06 PM    Specimen: Blood   Result Value Ref Range    Specimen Description . BLOOD     Special Requests  R AC 10 ML     Culture NO GROWTH <24 HRS    Urinalysis with Reflex to Culture    Collection Time: 11/11/22  9:15 PM    Specimen: Urine   Result Value Ref Range    Color, UA Dark Yellow (A) Yellow    Turbidity UA Clear Clear    Glucose, Ur NEGATIVE NEGATIVE    Bilirubin Urine NEGATIVE NEGATIVE    Ketones, Urine TRACE (A) NEGATIVE    Specific Gravity, UA 1.027 1.005 - 1.030    Urine Hgb TRACE (A) NEGATIVE    pH, UA 5.0 5.0 - 8.0    Protein, UA 1+ (A) NEGATIVE    Urobilinogen, Urine Normal Normal    Nitrite, Urine NEGATIVE NEGATIVE    Leukocyte Esterase, Urine SMALL (A) NEGATIVE   Microscopic Urinalysis    Collection Time: 11/11/22  9:15 PM   Result Value Ref Range    WBC, UA 10 TO 20 0 - 5 /HPF    RBC, UA 2 TO 5 0 - 2 /HPF    Epithelial Cells UA 0 TO 2 0 - 5 /HPF    Bacteria, UA FEW (A) None   Lactate, Sepsis    Collection Time: 11/11/22 10:04 PM   Result Value Ref Range    Lactic Acid, Sepsis, Whole Blood 1.1 0.5 - 1.9 mmol/L       Imaging/Diagnostics:  XR CHEST PORTABLE    Result Date: 11/11/2022  No acute cardiopulmonary findings. CT FOOT LEFT WO CONTRAST    Result Date: 11/11/2022  1. No acute osseous abnormality. Normal midfoot alignment.  2. Nonspecific subcutaneous edema. No drainable fluid collection or soft tissue gas. Assessment :      Hospital Problems             Last Modified POA    * (Principal) Sepsis ([facility] [address] 11/11/2022 Yes   #Sepsis  -patient meets criteria of sepsis with likely source being soft tissue infection of left ankle but concern of atypical presentation and more to his presentation. He doesn't appear toxic. Possible inflammatory response and him being off his medications for 1 week could contribute to his presentation. -empericly abx, cultures, IV with caution given his low EF. Narrow abx.   -Echo for concern of endocarditis with embolic phenomenon. #Left foot swelling and pain  -Lactic 1.1, procalcitonin 0.22,  WBC 17.1, . ,   -recently seen at [facility], started on doxy for cellulitis after root trauma  -CXR unrevealing   -UA: trace [place], small trace LE,   -CT foot: alignment. Nonspecific subcutaneous edema. No drainable fluid collection or soft tissue gas. -uric acid, cultures, cpk,   Plan  -concern that presentation is unusual. His weight loss, 2 months of intermiitent chills, worsening back pain and murmer could suggest endocarditis with possible embolic phenomenon or a occult psoas abcess vs pathological fracture. thus will screen with  Echo with possible LIAS.   -Inability to lift his left leg is new, mild spinal tenderness, concerning for radiculopathy involvement from possible compression vs infiltrate. This will obtain CT lumbar w/contrast []  -analgesia, non weight bearing, fall precautions, cultures, stress ulccer ppx  -leg elevation, bedrest, PTOT  -CT lumbar w/ contrast []  -ortho consult     #history of bladder cancer/low grade papillary UCC.  -last week cystoscopy was performed which showed an erythematous patch on the posterior bladder wall.  -s/p Cystourethroscopy last month with biopsy   Plan  -pending culture  -patient reports he has only been told his massess were benign. No hx of cancer that he knows of. -has a ahuja placed in ER. Reports hx of BPH symptoms but now has incontinent. Denies blood in urine. #CABG x4, AICD, on DAPT  #HFrEF  -Echo in 2016: Dilated left ventricle with moderate to severely decreased systolic function. [place] is heavily trabeculated . Can not completely rule out non-compaction syndrome. Estimated ejection fraction is 20-25%. Normal right ventricular size and function. Trivial aortic insufficiency. Mild mitral regurgitation. Mild tricuspid regurgitation. Estimated right ventricular systolic pressure is 57 mmHg consistent with moderate pulmonary hypertension. PLAN  -resume home plavix  -patient is dry, warm, clear lungs. Has been off lasix for a week. Ketones in urine. Nml renal function. No SOB, TALBOT, PND, orthopnea. -hold his home lasix and K  -Will need echo as there is concern for possible subacute endocarditis. #COPD/ Pulmonary hypertension  -stable. No wheezing. Lungs clear. No distress.   -Former smoker. No tobacco in 1 year.   -Estimated right ventricular systolic pressure is 57 mmHg consistent with  moderate pulmonary hypertension. #Toxic thyroid nodule    -no tenderness. No voice change.   -Nuclear uptake scan on 5/22: Negative thyroid uptake and scan.   -TSH []        On lovenox sub Q VE ppx

## 2022-11-12 NOTE — PROGRESS NOTES
Patient has an Unsafe Pacemaker. I called today at 1:15pm and spoke with the pacemaker , [employer]. They said the model is unsafe and the patient cannot have an MRI. Pacemaker with lead was placed on 9/22/2016. [employer] Incepta ICD Model # E160  RV Lead [employer] [place] SG Model # [id number]    I perfect served Dr. Manuel Taylor and informed him that it was unsafe to perform the MRI and I called the RN and informed her as well.

## 2022-11-12 NOTE — PROGRESS NOTES
[address] [facility] Pharmacokinetic Monitoring Service - Vancomycin    Consulting Provider: Devi Fletcher   Indication: Sepsis  Target Concentration: Goal AUC/PRANAV 400-600 mg*hr/L  Day of Therapy: 2  Additional Antimicrobials: Zosyn    Pertinent Laboratory Values: Wt Readings from Last 1 Encounters:   11/12/22 210 lb 1.6 oz (95.3 kg)     Temp Readings from Last 1 Encounters:   11/12/22 97.8 °F (36.6 °C) (Oral)     Estimated Creatinine Clearance: 87 mL/min (based on SCr of 0.89 mg/dL). Recent Labs     11/11/22  1818 11/12/22  0219   CREATININE 1.03 0.89   WBC 17.7*  --      Pertinent Cultures:  Culture Date Source Results   11/11 blood No growth < 24 hours   11/12 blood No growth < 24 hours   MRSA Nasal Swab: N/A. Non-respiratory infection. Recent vancomycin administrations                     vancomycin (VANCOCIN) 750 mg in sodium chloride 0.9 % 250 mL IVPB (mg) 750 mg New Bag 11/12/22 0245    vancomycin (VANCOCIN) 1500 mg in sodium chloride 0.9 % 250 mL IVPB (mg) 1,500 mg New Bag 11/11/22 2316                    Assessment:  Date/Time Current Dose Concentration Timing of Concentration (h) AUC   11/12 1229 750 mg Q12H 12.0 8 hour 54 min 377   Note: Serum concentrations collected for AUC dosing may appear elevated if collected in close proximity to the dose administered, this is not necessarily an indication of toxicity    Plan:  Current dosing regimen is sub-therapeutic with predicted AUC of 377 and trough at 12.5 at steady state.   Increase dose to 1000 mg every 12 hours with a predicted AUC of 495 and trough of 16.6 at steady state  Repeat vancomycin concentration ordered for TBD @ TBD   Pharmacy will continue to monitor patient and adjust therapy as indicated    Thank you for the consult,  ZOILA Siu [facility]  11/12/2022 12:26 PM

## 2022-11-12 NOTE — PROGRESS NOTES
[address] [facility] Pharmacokinetic Monitoring Service - Vancomycin     Ghazala Reddy is a 70 y.o. male starting on vancomycin therapy for sepsis. Pharmacy consulted by Giana Boucher for monitoring and adjustment. Target Concentration: Goal AUC/PRANAV 400-600 mg*hr/L    Additional Antimicrobials: Zosyn    Pertinent Laboratory Values: Wt Readings from Last 1 Encounters:   11/12/22 203 lb 4.2 oz (92.2 kg)     Temp Readings from Last 1 Encounters:   11/12/22 98.4 °F (36.9 °C) (Oral)     Estimated Creatinine Clearance: 74 mL/min (based on SCr of 1.03 mg/dL). Recent Labs     11/11/22  1818   CREATININE 1.03   WBC 17.7*     Procalcitonin:     Pertinent Cultures:  Culture Date Source Results        MRSA Nasal Swab: N/A. Non-respiratory infection.     Plan:  Dosing recommendations based on Bayesian software  Start vancomycin 750 mg IV every 12 hours  Anticipated AUC of 428 and trough concentration of 14.4 at steady state  Renal labs as indicated      Pharmacy will continue to monitor patient and adjust therapy as indicated    Thank you for the consult,  Garfield Giraldo, [address] [facility]  11/12/2022 1:02 AM

## 2022-11-12 NOTE — CONSULTS
[facility] - Initial Consult Note  Today's Date and Time: 11/12/2022, 12:35 PM    Impression :   Left ankle effusion and pain after rolling his ankle during a fall   Low likelihood of septic arthritis  Possible crystal arthropathy  CAD s/p CABG  COPD  HFrEF, EF (2016): 20-25 %, s/p AICD placement 2016  HTN  Chronic back pain  H/o bladder masses (benign)    Recommendations:   Continue Vancomycin pending cultures  D/C  Zosyn  Further recommendations pending culture data    Medical Decision Making/Summary/Discussion:11/12/2022       Infection Control Recommendations   Rogers Precautions    Antimicrobial Stewardship Recommendations     Simplification of therapy  Targeted therapy  PK dosing    Coordination of Outpatient Care:   Estimated Length of IV antimicrobials:TBD  Patient will need Midline Catheter Insertion: TBD  Patient will need PICC line Insertion:BD  Patient will need: Home IV , [place],  SNF,  LTAC: TBD  Patient will need outpatient wound care:No    Chief complaint/reason for consultation:   Concern for septic arthritis      History of Present Illness:   Everardo Chicas is a 70y.o.-year-old male who was initially admitted on 11/11/2022. Patient seen at the request of Dr. Lilia Garcia:    Patient presents to the hospital with left ankle pain, and bilateral leg weakness. Patient reports that his symptoms started 2 to 3 days ago when he fell down and rolled his ankle, he went to [facility] where the x-ray did not reveal any fractures but there was concern for possible infection thus he was managed with doxycycline twice daily. He was discharged and he came back to [facility] ER 2 days later with the unresolved left ankle pain, bilateral leg weakness. Past medical history is significant for CAD s/p CABG, COPD, benign bladder tumors, BPH and chronic back pain. He also had a bladder cystoscopy 1 week ago for removal of bladder tumor.   He has had 2 bladder masses removed previously which were negative for any malignancy    He denies any fever, nausea, vomiting, chest pain, shortness of breath or any abdominal pain, and dysuria. On evaluation patient is alert and oriented, in no apparent distress. He is afebrile, hemodynamically stable, saturating well on room air. He is complaining of neck pain and back pain but reports that the ankle pain is much improved. XR ANKLE LEFT (MIN 3 VIEWS)   Final Result   No acute osseous or soft tissue abnormality. XR FOOT LEFT (MIN 3 VIEWS)   Final Result   Soft tissue swelling without acute osseous abnormality. XR CHEST PORTABLE   Final Result   No acute cardiopulmonary findings. CT FOOT LEFT WO CONTRAST   Final Result   1. No acute osseous abnormality. Normal midfoot alignment. 2. Nonspecific subcutaneous edema. No drainable fluid collection or soft   tissue gas. CURRENT EVALUATION 11/12/2022  /72   Pulse 89   Temp 97.8 °F (36.6 °C) (Oral)   Resp 19   Ht 5' 9\" (1.753 m)   Wt 210 lb 1.6 oz (95.3 kg)   SpO2 91%   BMI 31.03 kg/m²     Afebrile  VS stable  Saturating well on room air    Alert and oriented, in no apparent distress    S/p left ankle aspiration 11-12-22  Culture is still pending  WBC <11,937, many neutrophils and no bacteria seen on direct exam    Started on vancomycin and zosyn as per primary      Labs, X rays reviewed: 11/12/2022    BUN: 16  Cr: 0.89    WBC: 17.7  Hb: 13.3  Plat:  467    CRP: 391.1      Cultures:  Urine:    Blood:  11-11-22: No growth  11-12-22: No growth  Sputum :    Wound:    MRSA Nares:      Imaging:     CXR 11-11-22      Left ankle 11-11-22      Discussed with patient, RN, family. I have personally reviewed the past medical history, past surgical history, medications, social history, and family history, and I have updated the database accordingly.   Past Medical History:     Past Medical History:   Diagnosis Date    Abnormal EKG     anterior fascicular block    Bladder tumor     CAD (coronary artery disease)     Clinical trial participant at discharge 03/24/2016    disqualified 3/24/2016    COPD (chronic obstructive pulmonary disease) ([address]     Depression     GERD (gastroesophageal reflux disease)     Hearing loss     History of blood transfusion     Hyperlipidemia     Hypertension 2012    Panic attacks     Prostate hypertrophy     Under care of team     Dr. Jessie Carlin, cardiology    Wellness examination     -PCP seen in Dec. 2019       Past Surgical  History:     Past Surgical History:   Procedure Laterality Date    BLADDER TUMOR EXCISION  01/15/2016    TURB with gyrus    CARDIAC DEFIBRILLATOR PLACEMENT  09/22/2016    CORONARY ARTERY BYPASS GRAFT  03/27/2016    X 4 Emergent    CORONARY ARTERY BYPASS GRAFT  04/01/2016    CYSTOSCOPY      CYSTOSCOPY  01/15/2016    CYSTOSCOPY  10/26/2022    CYSTOSCOPY BLADDER BIOPSY, FULGARATION    CYSTOSCOPY N/A 10/26/2022    CYSTOSCOPY BLADDER BIOPSY, FULGARATION performed by Dewayne Underwood MD at [place] [address] Right     LIPOMA RESECTION  child    anterior neck    TONSILLECTOMY AND ADENOIDECTOMY  child       Medications:      atorvastatin  40 mg Oral Nightly    buPROPion  450 mg Oral QAM    [Held by provider] clopidogrel  75 mg Oral Daily    fenofibrate  54 mg Oral Daily    budesonide-formoterol  2 puff Inhalation BID    furosemide  40 mg Oral Daily    lisinopril  2.5 mg Oral Daily    metoprolol tartrate  25 mg Oral BID    traZODone  150 mg Oral Nightly    vilazodone HCl  20 mg Oral Daily    sodium chloride flush  5-40 mL IntraVENous 2 times per day    enoxaparin  40 mg SubCUTAneous Daily    piperacillin-tazobactam  4,500 mg IntraVENous Q8H    vancomycin  750 mg IntraVENous Q12H    vancomycin (VANCOCIN) intermittent dosing (placeholder)   Other RX Placeholder    [START ON 11/13/2022] gabapentin  100 mg Oral TID    sennosides-docusate sodium  2 tablet Oral Daily    LORazepam  1 mg Oral Once    ARIPiprazole  10 mg Oral Daily       Social History:     Social History     Socioeconomic History    Marital status:      Spouse name: Not on file    Number of children: 1    Years of education: Not on file    Highest education level: Not on file   Occupational History    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   Tobacco Use    Smoking status: Former     Packs/day: 0.50     Types: Cigars, Cigarettes     Start date: 1966     Quit date: 2021     Years since quittin.0    Smokeless tobacco: Former     Types: Snuff, [place]     Quit date: 3/23/2016   Vaping Use    Vaping Use: Never used   Substance and Sexual Activity    Alcohol use: Yes     Alcohol/week: 7.0 standard drinks     Types: 7 Cans of beer per week     Comment: occasionally    Drug use: Yes     Types: Marijuana [employer])    Sexual activity: Not on file   Other Topics Concern    Not on file   Social History Narrative    Not on file     Social Determinants of Health     Financial Resource Strain: Low Risk     Difficulty of Paying Living Expenses: Not hard at all   Food Insecurity: No Food Insecurity    Worried About [address] in the Last Year: Never true    [facility] in the Last Year: Never true   Transportation Needs: No Transportation Needs    Lack of Transportation (Medical): No    Lack of Transportation (Non-Medical):  No   Physical Activity: Insufficiently Active    Days of Exercise per Week: 3 days    Minutes of Exercise per Session: 30 min   Stress: Stress Concern Present    Feeling of Stress : Very much   Social Connections: Socially Isolated    Frequency of Communication with Friends and Family: Never    Frequency of Social Gatherings with Friends and Family: More than three times a week    Attends Mandaen Services: Never    Active Member of Clubs or Organizations: No    Attends Club or Organization Meetings: Never    Marital Status:    Intimate Partner Violence: Not At Risk    Fear of Current or Ex-Partner: No Emotionally Abused: No    Physically Abused: No    Sexually Abused: No   Housing Stability: Unknown    Unable to Pay for Housing in the Last Year: No    Number of Places Lived in the Last Year: Not on file    Unstable Housing in the Last Year: No       Family History:     Family History   Problem Relation Age of Onset    Other Mother         blind    Heart Surgery Mother     Cancer Father         leukemia        Allergies:   Patient has no known allergies. Review of Systems:   Constitutional: No fevers or chills. No systemic complaints  Head: No headaches  Eyes: No double vision or blurry vision. No conjunctival inflammation. ENT: No sore throat or runny nose. . No hearing loss, tinnitus or vertigo. Cardiovascular: No chest pain or palpitations. No shortness of breath. No TALBOT  Lung: No shortness of breath or cough. No sputum production  Abdomen: No nausea, vomiting, diarrhea, or abdominal pain. Dominic Parisian No cramps. Genitourinary: No increased urinary frequency, or dysuria. No hematuria. No suprapubic or CVA pain  Musculoskeletal: left ankle pain, bilateral lower extremity pain and weakness  Neurologic: No headache, weakness, numbness, or tingling. Integument: No rash, no ulcers. Psychiatric: No depression. Endocrine: No polyuria, no polydipsia, no polyphagia. Physical Examination :   Patient Vitals for the past 8 hrs:   BP Temp Temp src Pulse Resp SpO2 Weight   11/12/22 1101 -- -- -- -- 19 -- --   11/12/22 0910 118/72 97.8 °F (36.6 °C) Oral 89 15 91 % --   11/12/22 0900 118/72 -- -- -- -- -- --   11/12/22 0743 -- -- -- 89 18 96 % --   11/12/22 0600 -- -- -- -- -- -- 210 lb 1.6 oz (95.3 kg)     General Appearance: Awake, alert, and in no apparent distress  Head:  Normocephalic, no trauma  Eyes: Pupils equal, round, reactive to light and accommodation; extraocular movements intact; sclera anicteric; conjunctivae pink. No embolic phenomena. ENT: Oropharynx clear, without erythema, exudate, or thrush.  No tenderness of sinuses. Mouth/throat: mucosa pink and moist. No lesions. Dentition in good repair. Neck:Supple, without lymphadenopathy. Thyroid normal, No bruits. Pulmonary/Chest: Clear to auscultation, without wheezes, rales, or rhonchi. No dullness to percussion. Cardiovascular: Regular rate and rhythm without murmurs, rubs, or gallops. Abdomen: Soft, non tender. Bowel sounds normal. No organomegaly  All four Extremities: no swelling, tenderness on palapation of left ankle, tenderness on palpation of lower extremity, restricted ROM left ankle, no warmth or redness. Neurologic: No gross sensory or motor deficits. Skin: Warm and dry with good turgor. No signs of peripheral arterial or venous insufficiency. No ulcerations. No open wounds. Medical Decision Making -Laboratory:   I have independently reviewed/ordered the following labs:    CBC with Differential:   Recent Labs     11/11/22 1818   WBC 17.7*   HGB 13.3   HCT 42.5   *   LYMPHOPCT 3*   MONOPCT 9*     BMP:   Recent Labs     11/11/22 1818 11/12/22 0219    138   K 4.0 3.6*    105   CO2 26 22   BUN 15 16   CREATININE 1.03 0.89     Hepatic Function Panel:   Recent Labs     11/11/22 1818 11/12/22 0219   PROT 7.7 6.3*   LABALBU 3.7 2.6*   BILITOT 0.8 0.6   ALKPHOS 89 70   ALT 8 <5*   AST 14 11     No results for input(s): RPR in the last 72 hours. No results for input(s): HIV in the last 72 hours. No results for input(s): BC in the last 72 hours.   Lab Results   Component Value Date/Time    MUCUS 1+ 03/26/2016 02:30 PM    RBC 5.56 11/11/2022 06:18 PM    RBC 4.85 02/13/2012 11:22 AM    TRICHOMONAS NOT REPORTED 03/26/2016 02:30 PM    WBC 17.7 11/11/2022 06:18 PM    YEAST NOT REPORTED 03/26/2016 02:30 PM    TURBIDITY Clear 11/11/2022 09:15 PM     Lab Results   Component Value Date/Time    CREATININE 0.89 11/12/2022 02:19 AM    GLUCOSE 102 11/12/2022 02:19 AM    GLUCOSE 105 02/13/2012 11:22 AM       Medical Decision Making-Imaging: HISTORY: possible tarsal avulsion seen on prior xray   TECHNOLOGIST PROVIDED HISTORY:   possible tarsal avulsion seen on prior xray   Decision Support Exception - unselect if not a suspected or confirmed   emergency medical condition->Emergency Medical Condition (MA)       FINDINGS:   The distal tibia and fibula are intact. No syndesmotic widening. The ankle   mortise is intact. Mild chondrocalcinosis of the tibiotalar joint. The   tibial plafond and talar dome are normal in appearance. The subtalar joint   is unremarkable. The talonavicular and calcaneocuboid joints are   unremarkable. The naviculocuneiform articulations are unremarkable. Mild   2nd tarsometatarsal degenerative changes. Normal midfoot alignment is   maintained. No Lisfranc interval widening. Mild chondrocalcinosis of the   midfoot. The metatarsophalangeal and interphalangeal joints are intact. No   fracture or dislocation identified. No osseous erosion or periosteal   reaction. Mild atherosclerotic vascular calcifications. The visualized tendons are   grossly intact. Dorsal mid and forefoot subcutaneous edema. Circumferential   subcutaneous edema about the ankle. No soft tissue gas or drainable fluid   collection. Impression   1. No acute osseous abnormality. Normal midfoot alignment. 2. Nonspecific subcutaneous edema. No drainable fluid collection or soft   tissue gas. Medical Decision [id number]:       Medical Decision Making-Other:     Note:  Labs, medications, radiologic studies were reviewed with personal review of films  Large amounts of data were reviewed  Discussed with nursing Staff, Discharge planner  Infection Control and Prevention measures reviewed  All prior entries were reviewed  Administer medications as ordered  Prognosis: Good  Discharge planning reviewed  Follow up as outpatient. Thank you for allowing us to participate in the care of this patient.  Please call with questions. Jose Antonio Lemos MD    ATTESTATION:    I have discussed the case, including pertinent history and exam findings with the residents and students. I have seen and examined the patient and the key elements of the encounter have been performed by me. I was present when the student obtained his information or examined the patient. I have reviewed the laboratory data, other diagnostic studies and discussed them with the residents. I have updated the medical record where necessary. I agree with the assessment, plan and orders as documented by the resident/ student.     Carlos Benitez MD.    Pager: [phone] - Office: [phone]

## 2022-11-12 NOTE — PROGRESS NOTES
[facility]  Office: [address], DO, Amina Peacock, DO, Keturah Brown, DO, Salo Vusondra Mehta, DO, Chase Jacinto MD, Joselito Aggarwal MD, Luiza Tay MD, Eleazar Chowdary MD,  Savanah Jacques MD, Lenora Obrien MD, Jewels Santana, DO, Yosvany Hinojosa MD,  Dewayne Haley MD, Elizabeth Reagan MD, Evans Zayas, DO, Karlee Jalloh MD, Libby Kaur MD, Katalina Rose DO, Nikos Isidro MD, Balta Corea MD, Shaka Moralez MD, Bhupendra Fajardo MD, Justin Brower DO, Esther Thomas MD, Cj Wilkinson MD, Amanda Mckeon, CNP,  Haritha Barber, CNP, Brielle Nguyen, CNP, Camila Patiño, CNP,  Valerie Gregg, [facility], Enedina Payan, CNP, Andriy Cerrato, CNP, Jeny Hunt, CNP, Danuta Crisostomo, CNP, Burton Gutierrez, CNP, Rupinder Grover PA-C, Slick Coyne, CNS, Marly Rodríguez, DNP, Efren Aragon, CNP, Grace Cook, CNP, Kortney Day, CNP         [address] Rojas [address]    Progress Note    11/12/2022    11:43 AM    Name:   Flakita Alvarado  MRN:     [id number]     Acct:      [de-identified]   Room:   2005/2005-01  IP Day:  1  Admit Date:  11/11/2022  3:49 PM    PCP:   Carissa Mac MD  Code Status:  Full Code    Subjective:     C/C:   Chief Complaint   Patient presents with    Ankle Pain     Interval History Status: not changed. Pt has severe pain in left ankle making it hard for him to walk or bend/move his foot. Its a 10/10 and made better with ICE/rest worse with movement. He says symptoms started on Tuesday prior to that he was feeling fine. He has not had any fevers, chills, nausea, vomiting or diarrhea. Brief History: This is a 70year old male who presents to our hospital with complaints of left foot pain. CT of his left foot showed no acute osseous abnormality and normal midfoot alignment but did show subcutaneous edema without any drainable  fluid collection or soft tissue gas.  Pt was febrile with Tmax 100.8, with WBC 17 and left shift. There was concern for septic arthritis. Orthopedic surgery was consulted, he has aspiration of joint which showed: He was seen by Infectious disease who recommended broad spectrum antibiotics with Zosyn and Vancomycin. An MRI of his lumbar spine was done due to leg weakness and concern for possible abscess which showed:     Review of Systems:     Constitutional:  negative for chills, fevers, sweats  Respiratory:  negative for cough, dyspnea on exertion, shortness of breath, wheezing  Cardiovascular:  negative for chest pain, chest pressure/discomfort, lower extremity edema, palpitations  Gastrointestinal:  negative for abdominal pain, constipation, diarrhea, nausea, vomiting  Neurological:  negative for dizziness, headache  EXT: admits to severe pain in left foot making it difficult to ambulate extremity     Medications:      Allergies:  No Known Allergies    Current Meds:   Scheduled Meds:    atorvastatin  40 mg Oral Nightly    buPROPion  450 mg Oral QAM    [Held by provider] clopidogrel  75 mg Oral Daily    [Held by provider] fenofibrate  54 mg Oral Daily    budesonide-formoterol  2 puff Inhalation BID    [Held by provider] furosemide  40 mg Oral Daily    lisinopril  2.5 mg Oral Daily    metoprolol tartrate  25 mg Oral BID    [Held by provider] potassium chloride  10 mEq Oral BID    traZODone  150 mg Oral Nightly    vilazodone HCl  20 mg Oral Daily    sodium chloride flush  5-40 mL IntraVENous 2 times per day    enoxaparin  40 mg SubCUTAneous Daily    piperacillin-tazobactam  4,500 mg IntraVENous Q8H    vancomycin  750 mg IntraVENous Q12H    vancomycin (VANCOCIN) intermittent dosing (placeholder)   Other RX Placeholder    [START ON 11/13/2022] gabapentin  100 mg Oral TID    sennosides-docusate sodium  2 tablet Oral Daily    LORazepam  1 mg Oral Once     Continuous Infusions:    sodium chloride 20 mL/hr (11/12/22 0244)     PRN Meds: hydrOXYzine HCl, sodium chloride flush, sodium chloride, acetaminophen **OR** acetaminophen, albuterol, oxyCODONE    Data:     Past Medical History:   has a past medical history of Abnormal EKG, Bladder tumor, CAD (coronary artery disease), Clinical trial participant at discharge, COPD (chronic obstructive pulmonary disease) ([address] Depression, GERD (gastroesophageal reflux disease), Hearing loss, History of blood transfusion, Hyperlipidemia, Hypertension, Panic attacks, Prostate hypertrophy, Under care of team, and Wellness examination. Social History:   reports that he quit smoking about a year ago. His smoking use included cigars and cigarettes. He started smoking about 56 years ago. He smoked an average of .5 packs per day. He quit smokeless tobacco use about 6 years ago. His smokeless tobacco use included snuff and chew. He reports current alcohol use of about 7.0 standard drinks per week. He reports current drug use. Drug: Marijuana Joanna Miah). Family History:   Family History   Problem Relation Age of Onset    Other Mother         blind    Heart Surgery Mother     Cancer Father         leukemia       Vitals:  /72   Pulse 89   Temp 97.8 °F (36.6 °C) (Oral)   Resp 19   Ht 5' 9\" (1.753 m)   Wt 210 lb 1.6 oz (95.3 kg)   SpO2 91%   BMI 31.03 kg/m²   Temp (24hrs), Av.3 °F (30.2 °C), Min:36.3 °F (2.4 °C), Max:100.8 °F (38.2 °C)    No results for input(s): POCGLU in the last 72 hours. I/O (24Hr):   No intake or output data in the 24 hours ending 22 1143    Labs:  Hematology:  Recent Labs     22  1818 22  2042 22  0219   WBC 17.7*  --   --    RBC 5.56  --   --    HGB 13.3  --   --    HCT 42.5  --   --    MCV 76.4*  --   --    MCH 23.9*  --   --    MCHC 31.3  --   --    RDW 16.6*  --   --    *  --   --    MPV 10.1  --   --    SEDRATE 117*  --   --    .1*  --   --    INR  --  1.1 1.2     Chemistry:  Recent Labs     22  1818 22  0219     --  138   K 4.0  --  3.6*     --  105 CO2 26  --  22   GLUCOSE 130*  --  102*   BUN 15  --  16   CREATININE 1.03  --  0.89   ANIONGAP 13  --  11   LABGLOM >60  --  >60   CALCIUM 9.8  --  8.7   CKTOTAL  --  57  --      Recent Labs     11/11/22  1818 11/11/22 2042 11/12/22  0219   PROT 7.7  --  6.3*   LABALBU 3.7  --  2.6*   TSH  --  0.36  --    AST 14  --  11   ALT 8  --  <5*   ALKPHOS 89  --  70   BILITOT 0.8  --  0.6   URICACID  --  5.6  --      ABG:  Lab Results   Component Value Date/Time    POCPH 7.41 03/28/2016 05:31 AM    POCPCO2 37 03/28/2016 05:31 AM    POCPO2 105 03/28/2016 05:31 AM    POCHCO3 23.3 03/28/2016 05:31 AM    NBEA 1 03/28/2016 05:31 AM    PBEA NOT REPORTED 03/28/2016 05:31 AM    MDG7WEO 24 03/28/2016 05:31 AM    DTIO1JXW 98 03/28/2016 05:31 AM    FIO2 40.0 03/28/2016 05:31 AM     Lab Results   Component Value Date/Time    SPECIAL LEFT AC 3ML 11/12/2022 02:19 AM    SPECIAL LEFT FOREARM 2ML 11/12/2022 02:19 AM     Lab Results   Component Value Date/Time    CULTURE NO GROWTH <24 HRS 11/12/2022 02:19 AM    CULTURE NO GROWTH <24 HRS 11/12/2022 02:19 AM       Radiology:  XR ANKLE LEFT (MIN 3 VIEWS)    Result Date: 11/12/2022  No acute osseous or soft tissue abnormality. XR FOOT LEFT (MIN 3 VIEWS)    Result Date: 11/12/2022  Soft tissue swelling without acute osseous abnormality. XR CHEST PORTABLE    Result Date: 11/11/2022  No acute cardiopulmonary findings. CT FOOT LEFT WO CONTRAST    Result Date: 11/11/2022  1. No acute osseous abnormality. Normal midfoot alignment. 2. Nonspecific subcutaneous edema. No drainable fluid collection or soft tissue gas.        Physical Examination:        General appearance:  alert, cooperative and no distress  Mental Status:  oriented to person, place and time and normal affect  Lungs:  clear to auscultation bilaterally, normal effort  Heart:  regular rate and rhythm, no murmur  Abdomen:  soft, nontender, nondistended, normal bowel sounds, no masses, hepatomegaly, splenomegaly  Extremities: no edema, redness, tenderness in the calves. There is  pain with palpation of left leg, he is restricted with active and passive movement. Skin:  no gross lesions, rashes, induration there is some erythema noted on left leg    Assessment:        Hospital Problems             Last Modified POA    * (Principal) Septic arthritis of left ankle ([facility] 11/12/2022 Yes    S/P cardiac cath- Multivessel CAD 3/24/16-Dr. winters 11/12/2022 Yes    Sepsis ([facility] 11/12/2022 Yes    Acute left ankle pain 11/12/2022 Yes    Left leg weakness 11/12/2022 Yes    Ischemic cardiomyopathy 11/12/2022 Yes    Class 1 obesity due to excess calories with serious comorbidity and body mass index (BMI) of 31.0 to 31.9 in adult 11/12/2022 Yes    [facility] 11/12/2022 Yes    Hyperlipidemia 11/12/2022 Yes    S/P CABG x 4 11/12/2022 Yes    Primary hypertension 11/12/2022 Yes    Microcytosis 11/12/2022 Yes    Acute hypokalemia 11/12/2022 Yes        Plan:        Sepsis 2/2 Left ankle pain and swelling concerning for septic arthritis: Orthopedic surgery consulted, recommend aspiration of joint to r/o infection. Cultures ordered and pending. CRP: 391, ESR:117. Continue Zosyn/vancomycin for now. Consult ID>   Left leg weakness: Check MRI lumbar spine. PTOT  Microcytosis without anemia: check iron studies. Acute Hypokalemia: check potassium. BPH: patient has ahuja in place, try voiding trial. He did not have any retention. History of ischemic cardiomyopathy with EF of 20 to 25% on echo from 2016: Currently appears compensated. He has AICD in place. Restart home lasix, lisinopril and BB. History of coronary artery disease s/p CABG x4: Hold plavix incase pt needs surgical intervention continue lipitor. COPD: Stable. No exacerbation  Primary HTN: stable.    Follow up [address] DO  11/12/2022  11:43 AM

## 2022-11-12 NOTE — CARE COORDINATION
11/12/22 0853   Service Assessment   Patient Orientation Alert and Oriented   Cognition Alert   History Provided By Patient   Primary Caregiver Self   Support Systems Spouse/Significant Other;Friends/Neighbors   Patient's Healthcare Decision Maker is: Legal Next of Kin   PCP Verified by CM Yes   Last Visit to PCP Within last 3 months   Prior Functional Level Assistance with the following:;Shopping   Current Functional Level Assistance with the following:;Shopping   Can patient return to prior living arrangement Yes   Ability to make needs known: Good   Family able to assist with home care needs: Yes   Would you like for me to discuss the discharge plan with any other family members/significant others, and if so, who? No   Financial Resources Medicaid; Medicare   Discharge Planning   Type of Residence House   Living Arrangements Spouse/Significant Other   Current Services Prior To Admission Durable Medical Equipment   Current DME Prior to [employer]   DME Ordered? No   Potential Assistance Purchasing Medications No   Patient expects to be discharged to: House   One/Two Story Residence Two story   History of falls? 0   Services At/After Discharge   Transition of Care Consult (CM Consult) Discharge Planning   Services At/After Discharge Home Health   Confirm Follow Up Transport Other (see comment)  (Friend)   Condition of Participation: Discharge Planning   The Plan for Transition of Care is related to the following treatment goals: To be able to walk out of here. Pt's goal is to home, will have transportation, follow for transition planning.

## 2022-11-12 NOTE — PLAN OF CARE
Problem: Discharge Planning  Goal: Discharge to home or other facility with appropriate resources  Outcome: Progressing     Problem: Pain  Goal: Verbalizes/displays adequate comfort level or baseline comfort level  Outcome: Progressing     Problem: Safety - Adult  Goal: Free from fall injury  Outcome: Progressing     Problem: ABCDS Injury Assessment  Goal: Absence of physical injury  Outcome: Progressing     Problem: Respiratory - Adult  Goal: Achieves optimal ventilation and oxygenation  11/12/2022 1732 by Milan Mccarty RN  Outcome: Progressing  11/12/2022 1114 by Gino Davis RCP  Outcome: Progressing  11/12/2022 1114 by Gino Davis RCP  Flowsheets (Taken 11/12/2022 1114)  Achieves optimal ventilation and oxygenation:   Assess for changes in respiratory status   Initiate smoking cessation protocol as indicated   Position to facilitate oxygenation and minimize respiratory effort   Assess the need for suctioning and aspirate as needed   Respiratory therapy support as indicated   Assess for changes in mentation and behavior   Oxygen supplementation based on oxygen saturation or arterial blood gases   Encourage broncho-pulmonary hygiene including cough, deep breathe, incentive spirometry   Assess and instruct to report shortness of breath or any respiratory difficulty

## 2022-11-12 NOTE — PLAN OF CARE
Problem: Respiratory - Adult  Goal: Achieves optimal ventilation and oxygenation  11/12/2022 1114 by Morgan Ayon RCP  Outcome: Progressing  11/12/2022 1114 by Morgan Ayon RCP  Flowsheets (Taken 11/12/2022 1114)  Achieves optimal ventilation and oxygenation:   Assess for changes in respiratory status   Initiate smoking cessation protocol as indicated   Position to facilitate oxygenation and minimize respiratory effort   Assess the need for suctioning and aspirate as needed   Respiratory therapy support as indicated   Assess for changes in mentation and behavior   Oxygen supplementation based on oxygen saturation or arterial blood gases   Encourage broncho-pulmonary hygiene including cough, deep breathe, incentive spirometry   Assess and instruct to report shortness of breath or any respiratory difficulty

## 2022-11-13 ENCOUNTER — APPOINTMENT (OUTPATIENT)
Dept: GENERAL RADIOLOGY | Age: 72
DRG: 553 | End: 2022-11-13
Payer: MEDICARE

## 2022-11-13 LAB
C-REACTIVE PROTEIN: 372.7 MG/L (ref 0–5)
CRYSTALS, FLUID: POSITIVE
LYMPHOCYTES, BODY FLUID: 1 %
MRSA, DNA, NASAL: NEGATIVE
NEUTROPHIL, FLUID: 96 %
OTHER CELLS FLUID: NORMAL %
PROSTATE SPECIFIC ANTIGEN: 0.88 NG/ML
RBC FLUID: <3000 /MM3
SPECIMEN DESCRIPTION: NORMAL
SPECIMEN TYPE: ABNORMAL
SPECIMEN TYPE: NORMAL
WBC FLUID: NORMAL /MM3

## 2022-11-13 PROCEDURE — 2580000003 HC RX 258: Performed by: NURSE PRACTITIONER

## 2022-11-13 PROCEDURE — 97535 SELF CARE MNGMENT TRAINING: CPT

## 2022-11-13 PROCEDURE — 97530 THERAPEUTIC ACTIVITIES: CPT

## 2022-11-13 PROCEDURE — 36415 COLL VENOUS BLD VENIPUNCTURE: CPT

## 2022-11-13 PROCEDURE — 6370000000 HC RX 637 (ALT 250 FOR IP): Performed by: INTERNAL MEDICINE

## 2022-11-13 PROCEDURE — 6360000002 HC RX W HCPCS: Performed by: NURSE PRACTITIONER

## 2022-11-13 PROCEDURE — 99232 SBSQ HOSP IP/OBS MODERATE 35: CPT | Performed by: INTERNAL MEDICINE

## 2022-11-13 PROCEDURE — 94640 AIRWAY INHALATION TREATMENT: CPT

## 2022-11-13 PROCEDURE — 94761 N-INVAS EAR/PLS OXIMETRY MLT: CPT

## 2022-11-13 PROCEDURE — 6360000002 HC RX W HCPCS: Performed by: INTERNAL MEDICINE

## 2022-11-13 PROCEDURE — 6370000000 HC RX 637 (ALT 250 FOR IP): Performed by: NURSE PRACTITIONER

## 2022-11-13 PROCEDURE — 73610 X-RAY EXAM OF ANKLE: CPT

## 2022-11-13 PROCEDURE — 86140 C-REACTIVE PROTEIN: CPT

## 2022-11-13 PROCEDURE — 2060000000 HC ICU INTERMEDIATE R&B

## 2022-11-13 PROCEDURE — 97166 OT EVAL MOD COMPLEX 45 MIN: CPT

## 2022-11-13 PROCEDURE — 73630 X-RAY EXAM OF FOOT: CPT

## 2022-11-13 PROCEDURE — 2580000003 HC RX 258: Performed by: INTERNAL MEDICINE

## 2022-11-13 RX ORDER — POTASSIUM CHLORIDE 20 MEQ/1
40 TABLET, EXTENDED RELEASE ORAL PRN
Status: DISCONTINUED | OUTPATIENT
Start: 2022-11-13 | End: 2022-11-18 | Stop reason: HOSPADM

## 2022-11-13 RX ORDER — MAGNESIUM SULFATE 1 G/100ML
1000 INJECTION INTRAVENOUS PRN
Status: DISCONTINUED | OUTPATIENT
Start: 2022-11-13 | End: 2022-11-18 | Stop reason: HOSPADM

## 2022-11-13 RX ORDER — POTASSIUM CHLORIDE 7.45 MG/ML
10 INJECTION INTRAVENOUS PRN
Status: DISCONTINUED | OUTPATIENT
Start: 2022-11-13 | End: 2022-11-18 | Stop reason: HOSPADM

## 2022-11-13 RX ORDER — POTASSIUM CHLORIDE 20 MEQ/1
40 TABLET, EXTENDED RELEASE ORAL ONCE
Status: COMPLETED | OUTPATIENT
Start: 2022-11-13 | End: 2022-11-13

## 2022-11-13 RX ORDER — LANOLIN ALCOHOL/MO/W.PET/CERES
325 CREAM (GRAM) TOPICAL EVERY OTHER DAY
Status: DISCONTINUED | OUTPATIENT
Start: 2022-11-13 | End: 2022-11-16

## 2022-11-13 RX ORDER — MORPHINE SULFATE 4 MG/ML
4 INJECTION, SOLUTION INTRAMUSCULAR; INTRAVENOUS ONCE
Status: DISCONTINUED | OUTPATIENT
Start: 2022-11-13 | End: 2022-11-14

## 2022-11-13 RX ORDER — POLYETHYLENE GLYCOL 3350 17 G/17G
17 POWDER, FOR SOLUTION ORAL DAILY
Status: DISCONTINUED | OUTPATIENT
Start: 2022-11-13 | End: 2022-11-18 | Stop reason: HOSPADM

## 2022-11-13 RX ADMIN — ARIPIPRAZOLE 10 MG: 10 TABLET ORAL at 07:23

## 2022-11-13 RX ADMIN — TRAZODONE HYDROCHLORIDE 150 MG: 50 TABLET ORAL at 20:35

## 2022-11-13 RX ADMIN — Medication 1000 MG: at 14:55

## 2022-11-13 RX ADMIN — SODIUM CHLORIDE: 9 INJECTION, SOLUTION INTRAVENOUS at 02:13

## 2022-11-13 RX ADMIN — GABAPENTIN 100 MG: 100 CAPSULE ORAL at 20:35

## 2022-11-13 RX ADMIN — FENOFIBRATE 54 MG: 54 TABLET ORAL at 07:31

## 2022-11-13 RX ADMIN — SODIUM CHLORIDE, PRESERVATIVE FREE 10 ML: 5 INJECTION INTRAVENOUS at 20:36

## 2022-11-13 RX ADMIN — BUDESONIDE AND FORMOTEROL FUMARATE DIHYDRATE 2 PUFF: 160; 4.5 AEROSOL RESPIRATORY (INHALATION) at 20:53

## 2022-11-13 RX ADMIN — OXYCODONE 5 MG: 5 TABLET ORAL at 11:34

## 2022-11-13 RX ADMIN — POLYETHYLENE GLYCOL 3350 17 G: 17 POWDER, FOR SOLUTION ORAL at 11:34

## 2022-11-13 RX ADMIN — LISINOPRIL 2.5 MG: 2.5 TABLET ORAL at 07:22

## 2022-11-13 RX ADMIN — METOPROLOL TARTRATE 25 MG: 25 TABLET ORAL at 07:22

## 2022-11-13 RX ADMIN — Medication 1000 MG: at 02:35

## 2022-11-13 RX ADMIN — OXYCODONE 5 MG: 5 TABLET ORAL at 07:16

## 2022-11-13 RX ADMIN — GABAPENTIN 100 MG: 100 CAPSULE ORAL at 07:23

## 2022-11-13 RX ADMIN — METOPROLOL TARTRATE 25 MG: 25 TABLET ORAL at 20:36

## 2022-11-13 RX ADMIN — FERROUS SULFATE TAB EC 325 MG (65 MG FE EQUIVALENT) 325 MG: 325 (65 FE) TABLET DELAYED RESPONSE at 12:02

## 2022-11-13 RX ADMIN — VILAZODONE HYDROCHLORIDE 20 MG: 20 TABLET ORAL at 07:22

## 2022-11-13 RX ADMIN — DESMOPRESSIN ACETATE 40 MG: 0.2 TABLET ORAL at 20:35

## 2022-11-13 RX ADMIN — BUPROPION HYDROCHLORIDE 450 MG: 150 TABLET, EXTENDED RELEASE ORAL at 07:23

## 2022-11-13 RX ADMIN — OXYCODONE 5 MG: 5 TABLET ORAL at 20:36

## 2022-11-13 RX ADMIN — SODIUM CHLORIDE, PRESERVATIVE FREE 10 ML: 5 INJECTION INTRAVENOUS at 07:21

## 2022-11-13 RX ADMIN — DOCUSATE SODIUM 50 MG AND SENNOSIDES 8.6 MG 2 TABLET: 8.6; 5 TABLET, FILM COATED ORAL at 07:22

## 2022-11-13 RX ADMIN — POTASSIUM CHLORIDE 40 MEQ: 1500 TABLET, EXTENDED RELEASE ORAL at 11:33

## 2022-11-13 RX ADMIN — BUDESONIDE AND FORMOTEROL FUMARATE DIHYDRATE 2 PUFF: 160; 4.5 AEROSOL RESPIRATORY (INHALATION) at 08:29

## 2022-11-13 RX ADMIN — GABAPENTIN 100 MG: 100 CAPSULE ORAL at 14:52

## 2022-11-13 RX ADMIN — ENOXAPARIN SODIUM 40 MG: 100 INJECTION SUBCUTANEOUS at 07:21

## 2022-11-13 ASSESSMENT — PAIN DESCRIPTION - ORIENTATION
ORIENTATION: LEFT
ORIENTATION: LEFT
ORIENTATION: RIGHT;MID

## 2022-11-13 ASSESSMENT — PAIN DESCRIPTION - LOCATION: LOCATION: FOOT;ANKLE

## 2022-11-13 ASSESSMENT — PAIN DESCRIPTION - FREQUENCY: FREQUENCY: CONTINUOUS

## 2022-11-13 ASSESSMENT — PAIN SCALES - GENERAL
PAINLEVEL_OUTOF10: 10
PAINLEVEL_OUTOF10: 10
PAINLEVEL_OUTOF10: 5
PAINLEVEL_OUTOF10: 10

## 2022-11-13 ASSESSMENT — PAIN DESCRIPTION - DESCRIPTORS
DESCRIPTORS: DISCOMFORT;ACHING
DESCRIPTORS: ACHING
DESCRIPTORS: ACHING;DISCOMFORT

## 2022-11-13 ASSESSMENT — PAIN DESCRIPTION - ONSET: ONSET: ON-GOING

## 2022-11-13 NOTE — DISCHARGE INSTRUCTIONS
Orthopaedic Instructions:  -Weight bearing status: Weight bearing as tolerated with the left leg  -Always work on ankle , toe motion to decrease swelling.  -Ice (20 minutes on and off 1 hour) and elevate to reduce swelling and throbbing pain.  -Call the office or come to Emergency Room if signs of infection appear (hot, swollen, red, draining pus, fever)  -Take medications as prescribed.  -Wean off narcotics (percocet/norco) as soon as possible. Do not take tylenol if still taking narcotics.  -Follow up with Dr. Marisol Anderson in his office as needed. Call [phone] to schedule/confirm or with any questions/concerns.   -Follow up with Internal Medicine for medical management.

## 2022-11-13 NOTE — PROGRESS NOTES
Patient refuse lumbar CT scan d/t pain. Education given on the importance of getting required tests as ordered.

## 2022-11-13 NOTE — PROGRESS NOTES
Orthopedic Progress Note    Patient:  Misha Marvin  YOB: 1950     70 y.o. male    Subjective:  Patient seen and examined  Complains of left ankle pain but is well controlled at rest  No issue overnight per patient or nursing  Pain controlled on current regimen   Denies fever, HA, CP, SOB, N/V, dysuria    Vitals reviewed    Objective:   Vitals:    11/13/22 0829   BP:    Pulse: 76   Resp: 14   Temp:    SpO2: 92%       Gen: NAD, cooperative    Cardiovascular: Regular rate    Respiratory: Chest symmetric, no accessory muscle use, normal respirations, no audible wheezes    MSK: LLE: Skin intact. Sight erythema to the foot otherwise no induration, or cellulitic changes. Able to minimally actively range the ankle due to pain. Able to passively Range the ankle to neutral dorsiflexion with pain. Tender to palpation over the ATFL. Calf is non tender without swelling. Recent Labs     11/11/22  1818 11/11/22  2042 11/12/22  0219   WBC 17.7*  --   --    HGB 13.3  --   --    HCT 42.5  --   --    *  --   --    INR  --    < > 1.2     --  138   K 4.0  --  3.6*   BUN 15  --  16   CREATININE 1.03  --  0.89   GLUCOSE 130*  --  102*    < > = values in this interval not displayed. DVT ppx: Lovenox  Antibiotics: Vanc/Zosyn    See rec for complete list    Impression/plan: 70 y.o. male who twisted his ankle, being seen for:    -Left ankle sprain    -No orthopedic surgical intervention indicated  -Cultures NGTD. -Preliminary read for crystals is negative. With wait for final read Monday. -WB status: WBAT LLE.   Ok for walking boot when up and moving  -Pain control per primary team discretion.   -Ok for chemical AC from orthopedic perspective.   -Encourage Ice/Elevate for pain/edema control  -Encourage PT/OT  -Please page ortho on call with any questions      Blayne Curiel,    Orthopedic Surgery Resident PGY-2  [facility]

## 2022-11-13 NOTE — PROGRESS NOTES
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  CAD s/p CABG  COPD  HFrEF, EF (2016): 20-25 %, s/p AICD placement 2016  HTN  Chronic back pain  H/o bladder masses (benign)    Recommendations:   Continue Vancomycin pending cultures  D/C  Zosyn  Further recommendations pending culture data  No growth thus far  CT lumbar spine for lower extremity weakness    Medical Decision Making/Summary/Discussion:11/13/2022       Infection Control Recommendations   [place] Precautions    Antimicrobial Stewardship Recommendations     Simplification of therapy  Targeted therapy  PK dosing    Coordination of Outpatient Care:   Estimated Length of IV antimicrobials:TBD  Patient will need Midline Catheter Insertion: TBD  Patient will need PICC line Insertion:BD  Patient will need: Home IV , [place],  SNF,  LTAC: TBD  Patient will need outpatient wound care:No    Chief complaint/reason for consultation:   Concern for septic arthritis      History of Present Illness:   Balta Anderson is a 70y.o.-year-old male who was initially admitted on 11/11/2022. Patient seen at the request of Dr. Yash Mckeon:    Patient presents to the hospital with left ankle pain, and bilateral leg weakness. Patient reports that his symptoms started 2 to 3 days ago when he fell down and rolled his ankle, he went to [facility] where the x-ray did not reveal any fractures but there was concern for possible infection thus he was managed with doxycycline twice daily. He was discharged and he came back to [facility] ER 2 days later with the unresolved left ankle pain, bilateral leg weakness. Past medical history is significant for CAD s/p CABG, COPD, benign bladder tumors, BPH and chronic back pain.   He also had a bladder cystoscopy 1 week ago for removal of bladder tumor. He has had 2 bladder masses removed previously which were negative for any malignancy    He denies any fever, nausea, vomiting, chest pain, shortness of breath or any abdominal pain, and dysuria. On evaluation patient is alert and oriented, in no apparent distress. He is afebrile, hemodynamically stable, saturating well on room air. He is complaining of neck pain and back pain but reports that the ankle pain is much improved. XR ANKLE LEFT (MIN 3 VIEWS)   Final Result   No acute osseous or soft tissue abnormality. XR FOOT LEFT (MIN 3 VIEWS)   Final Result   Soft tissue swelling without acute osseous abnormality. XR CHEST PORTABLE   Final Result   No acute cardiopulmonary findings. CT FOOT LEFT WO CONTRAST   Final Result   1. No acute osseous abnormality. Normal midfoot alignment. 2. Nonspecific subcutaneous edema. No drainable fluid collection or soft   tissue gas. CURRENT EVALUATION 11/13/2022  /76   Pulse 76   Temp 97.8 °F (36.6 °C) (Oral)   Resp 14   Ht 5' 9\" (1.753 m)   Wt 202 lb 6.1 oz (91.8 kg)   SpO2 92%   BMI 29.89 kg/m²     Afebrile  VS stable    The patient is alert and oriented on room air. He does have some residual left ankle pain but is feeling better overall    S/p left ankle aspiration 11-12-22  Culture is still pending  WBC <11,937, many neutrophils and no bacteria seen on direct exam    Vancomycin continues    No acute issues per RN    Labs, X rays reviewed: 11/13/2022    BUN: 16  Cr: 0.89    WBC: 17.7  Hb: 13.3  Plat:  467    CRP: 391.1      Cultures:  Urine:    Blood:  11-11-22: No growth  11-12-22: No growth  Sputum :    Left ankle bursa aspirate:  11/12/22: No bacteria seen  MRSA Nares:  11/12/22: Not detected    Imaging:     CXR 11-11-22      Left ankle 11-11-22      Discussed with patient, RN, family.     I have personally reviewed the past medical history, past surgical history, medications, social history, and family history, and I have updated the database accordingly. Past Medical History:     Past Medical History:   Diagnosis Date    Abnormal EKG     anterior fascicular block    Bladder tumor     CAD (coronary artery disease)     Clinical trial participant at discharge 03/24/2016    disqualified 3/24/2016    COPD (chronic obstructive pulmonary disease) ([facility] [address]     Depression     GERD (gastroesophageal reflux disease)     Hearing loss     History of blood transfusion     Hyperlipidemia     Hypertension 2012    Panic attacks     Prostate hypertrophy     Under care of team     Dr. Bev Butler, cardiology    Wellness examination     -PCP seen in Dec. 2019       Past Surgical  History:     Past Surgical History:   Procedure Laterality Date    BLADDER TUMOR EXCISION  01/15/2016    TURB with gyrus    CARDIAC DEFIBRILLATOR PLACEMENT  09/22/2016    Unsafe Pacemaker. [place] 9881 ICD Model # U2803311.  RV Lead [employer] SG Model # [id number]    CORONARY ARTERY BYPASS GRAFT  03/27/2016    X 4 Emergent    CORONARY ARTERY BYPASS GRAFT  04/01/2016    CYSTOSCOPY      CYSTOSCOPY  01/15/2016    CYSTOSCOPY  10/26/2022    CYSTOSCOPY BLADDER BIOPSY, FULGARATION    CYSTOSCOPY N/A 10/26/2022    CYSTOSCOPY BLADDER BIOPSY, FULGARATION performed by Eliceo Rg MD at [place] [address] Right     LIPOMA RESECTION  child    anterior neck    TONSILLECTOMY AND ADENOIDECTOMY  child       Medications:      ferrous sulfate  325 mg Oral Every Other Day    morphine  4 mg IntraVENous Once    potassium chloride  40 mEq Oral Once    polyethylene glycol  17 g Oral Daily    atorvastatin  40 mg Oral Nightly    buPROPion  450 mg Oral QAM    [Held by provider] clopidogrel  75 mg Oral Daily    fenofibrate  54 mg Oral Daily    budesonide-formoterol  2 puff Inhalation BID    furosemide  40 mg Oral Daily    lisinopril  2.5 mg Oral Daily    metoprolol tartrate  25 mg Oral BID    traZODone  150 mg Oral Nightly    vilazodone HCl  20 mg Oral Daily    sodium chloride flush  5-40 mL IntraVENous 2 times per day    enoxaparin  40 mg SubCUTAneous Daily    vancomycin (VANCOCIN) intermittent dosing (placeholder)   Other RX Placeholder    gabapentin  100 mg Oral TID    sennosides-docusate sodium  2 tablet Oral Daily    ARIPiprazole  10 mg Oral Daily    vancomycin  1,000 mg IntraVENous Q12H       Social History:     Social History     Socioeconomic History    Marital status:      Spouse name: Not on file    Number of children: 1    Years of education: Not on file    Highest education level: Not on file   Occupational History    Occupation: [employer]   Tobacco Use    Smoking status: Former     Packs/day: 0.50     Types: Cigars, Cigarettes     Start date: 1966     Quit date: 2021     Years since quittin.0    Smokeless tobacco: Former     Types: Snuff, Chew     Quit date: 3/23/2016   Vaping Use    Vaping Use: Never used   Substance and Sexual Activity    Alcohol use: Yes     Alcohol/week: 7.0 standard drinks     Types: 7 Cans of beer per week     Comment: occasionally    Drug use: Yes     Types: Marijuana Shellye Burkitt)    Sexual activity: Not on file   Other Topics Concern    Not on file   Social History Narrative    Not on file     Social Determinants of Health     Financial Resource Strain: Low Risk     Difficulty of Paying Living Expenses: Not hard at all   Food Insecurity: No Food Insecurity    Worried About [facility] in the Last Year: Never true    [facility] in the Last Year: Never true   Transportation Needs: No Transportation Needs    Lack of Transportation (Medical): No    Lack of Transportation (Non-Medical):  No   Physical Activity: Insufficiently Active    Days of Exercise per Week: 3 days    Minutes of Exercise per Session: 30 min   Stress: Stress Concern Present    Feeling of Stress : Very much   Social Connections: Socially Isolated    Frequency of Communication with Friends and Family: Never    Frequency of Social Gatherings with Friends and Family: More than three times a week    Attends Denominational Services: Never    Active Member of Clubs or Organizations: No    Attends Club or Organization Meetings: Never    Marital Status:    Intimate Partner Violence: Not At Risk    Fear of Current or Ex-Partner: No    Emotionally Abused: No    Physically Abused: No    Sexually Abused: No   Housing Stability: Unknown    Unable to Pay for Housing in the Last Year: No    Number of Places Lived in the Last Year: Not on file    Unstable Housing in the Last Year: No       Family History:     Family History   Problem Relation Age of Onset    Other Mother         blind    Heart Surgery Mother     Cancer Father         leukemia        Allergies:   Patient has no known allergies. Review of Systems:   Constitutional: No fevers or chills. No systemic complaints  Head: No headaches  Eyes: No double vision or blurry vision. No conjunctival inflammation. ENT: No sore throat or runny nose. . No hearing loss, tinnitus or vertigo. Cardiovascular: No chest pain or palpitations. No shortness of breath. No TALBOT  Lung: No shortness of breath or cough. No sputum production  Abdomen: No nausea, vomiting, diarrhea, or abdominal pain. Derek Salm No cramps. Genitourinary: No increased urinary frequency, or dysuria. No hematuria. No suprapubic or CVA pain  Musculoskeletal: left ankle pain, bilateral lower extremity pain and weakness  Neurologic: No headache, weakness, numbness, or tingling. Integument: No rash, no ulcers. Psychiatric: No depression. Endocrine: No polyuria, no polydipsia, no polyphagia.     Physical Examination :   Patient Vitals for the past 8 hrs:   BP Temp Temp src Pulse Resp SpO2 Weight   11/13/22 0829 -- -- -- 76 14 92 % --   11/13/22 0721 134/76 -- -- 93 -- -- --   11/13/22 0716 -- -- -- -- 19 -- --   11/13/22 0408 -- -- -- -- -- -- 202 lb 6.1 oz (91.8 kg)   11/13/22 0401 139/78 97.8 °F (36.6 °C) Oral 94 16 92 % --     General Appearance: Awake, alert, and in no apparent distress  Head:  Normocephalic, no trauma  Eyes: Pupils equal, round, reactive to light and accommodation; extraocular movements intact; sclera anicteric; conjunctivae pink. No embolic phenomena. ENT: Oropharynx clear, without erythema, exudate, or thrush. No tenderness of sinuses. Mouth/throat: mucosa pink and moist. No lesions. Dentition in good repair. Neck:Supple, without lymphadenopathy. Thyroid normal, No bruits. Pulmonary/Chest: Clear to auscultation, without wheezes, rales, or rhonchi. No dullness to percussion. Cardiovascular: Regular rate and rhythm without murmurs, rubs, or gallops. Abdomen: Soft, non tender. Bowel sounds normal. No organomegaly  All four Extremities: no swelling, tenderness on palapation of left ankle, tenderness on palpation of lower extremity, restricted ROM left ankle, no warmth or redness. Neurologic: No gross sensory or motor deficits. Skin: Warm and dry with good turgor. No signs of peripheral arterial or venous insufficiency. No ulcerations. No open wounds. Medical Decision Making -Laboratory:   I have independently reviewed/ordered the following labs:    CBC with Differential:   Recent Labs     11/11/22 1818   WBC 17.7*   HGB 13.3   HCT 42.5   *   LYMPHOPCT 3*   MONOPCT 9*     BMP:   Recent Labs     11/11/22 1818 11/12/22  0219    138   K 4.0 3.6*    105   CO2 26 22   BUN 15 16   CREATININE 1.03 0.89     Hepatic Function Panel:   Recent Labs     11/11/22 1818 11/12/22  0219   PROT 7.7 6.3*   LABALBU 3.7 2.6*   BILITOT 0.8 0.6   ALKPHOS 89 70   ALT 8 <5*   AST 14 11     No results for input(s): RPR in the last 72 hours. No results for input(s): HIV in the last 72 hours. No results for input(s): BC in the last 72 hours.   Lab Results   Component Value Date/Time    MUCUS 1+ 03/26/2016 02:30 PM    RBC 5.56 11/11/2022 06:18 PM    RBC 4.85 02/13/2012 11:22 AM    TRICHOMONAS NOT REPORTED 03/26/2016 02:30 PM    WBC 17.7 11/11/2022 06:18 PM    YEAST NOT REPORTED 03/26/2016 02:30 PM    TURBIDITY Clear 11/11/2022 09:15 PM     Lab Results   Component Value Date/Time    CREATININE 0.89 11/12/2022 02:19 AM    GLUCOSE 102 11/12/2022 02:19 AM    GLUCOSE 105 02/13/2012 11:22 AM       Medical Decision Making-Imaging:     Narrative   EXAMINATION:   THREE XRAY VIEWS OF THE LEFT FOOT       11/12/2022 6:51 am       COMPARISON:   None. HISTORY:   ORDERING SYSTEM PROVIDED HISTORY: infection   TECHNOLOGIST PROVIDED HISTORY:   infection       FINDINGS:   There is no acute osseous abnormality. The joint spaces are maintained. There is soft tissue swelling. Impression   Soft tissue swelling without acute osseous abnormality. Narrative   EXAMINATION:   THREE XRAY VIEWS OF THE LEFT ANKLE       11/12/2022 6:51 am       COMPARISON:   None. HISTORY:   ORDERING SYSTEM PROVIDED HISTORY: infection   TECHNOLOGIST PROVIDED HISTORY:   infection       FINDINGS:   There is no acute osseous abnormality. The joint spaces are maintained. The   surrounding soft tissues are unremarkable. Impression   No acute osseous or soft tissue abnormality. Narrative   EXAMINATION:   ONE XRAY VIEW OF THE CHEST       11/11/2022 7:51 pm       COMPARISON:   05/17/2016       HISTORY:   ORDERING SYSTEM PROVIDED HISTORY: Sepsis   TECHNOLOGIST PROVIDED HISTORY:   Sepsis   Reason for Exam: upr,leg pain,sepsis       Initial encounter       FINDINGS:   Status post median sternotomy. Cardiac AICD is noted. No focal   consolidation, pleural effusion or pneumothorax. The cardiomediastinal   silhouette is stable. No overt pulmonary edema. The osseous structures are   stable. Impression   No acute cardiopulmonary findings.      Narrative   EXAMINATION:   CT OF THE LEFT FOOT WITHOUT CONTRAST 11/11/2022 6:36 pm       TECHNIQUE:   CT of the left foot was performed without the administration of intravenous   contrast.  Multiplanar reformatted images are provided for review. Automated   exposure control, iterative reconstruction, and/or weight based adjustment of   the mA/kV was utilized to reduce the radiation dose to as low as reasonably   achievable. COMPARISON:   None. HISTORY   ORDERING SYSTEM PROVIDED HISTORY: possible tarsal avulsion seen on prior xray   TECHNOLOGIST PROVIDED HISTORY:   possible tarsal avulsion seen on prior xray   Decision Support Exception - unselect if not a suspected or confirmed   emergency medical condition->Emergency Medical Condition (MA)       FINDINGS:   The distal tibia and fibula are intact. No syndesmotic widening. The ankle   mortise is intact. Mild chondrocalcinosis of the tibiotalar joint. The   tibial plafond and talar dome are normal in appearance. The subtalar joint   is unremarkable. The talonavicular and calcaneocuboid joints are   unremarkable. The naviculocuneiform articulations are unremarkable. Mild   2nd tarsometatarsal degenerative changes. Normal midfoot alignment is   maintained. No Lisfranc interval widening. Mild chondrocalcinosis of the   midfoot. The metatarsophalangeal and interphalangeal joints are intact. No   fracture or dislocation identified. No osseous erosion or periosteal   reaction. Mild atherosclerotic vascular calcifications. The visualized tendons are   grossly intact. Dorsal mid and forefoot subcutaneous edema. Circumferential   subcutaneous edema about the ankle. No soft tissue gas or drainable fluid   collection. Impression   1. No acute osseous abnormality. Normal midfoot alignment. 2. Nonspecific subcutaneous edema. No drainable fluid collection or soft   tissue gas.        Medical Decision [id number]:       Medical Decision Making-Other:     Note:  Labs, medications, radiologic studies were reviewed with personal review of films  Large amounts of data were reviewed  Discussed with nursing Staff, Discharge planner  Infection Control and Prevention measures reviewed  All prior entries were reviewed  Administer medications as ordered  Prognosis: Good  Discharge planning reviewed  Follow up as outpatient. Thank you for allowing us to participate in the care of this patient. Please call with questions. PRESTON Roman - CNP      ATTESTATION:    I have discussed the case, including pertinent history and exam findings with the APRN. I have evaluated the  History, physical findings and pictures of the patient and the key elements of the encounter have been performed by me. I have reviewed the laboratory data, other diagnostic studies and discussed them with the APRN. I have updated the medical record where necessary. I agree with the assessment, plan and orders as documented by the APRN.     Gustabo Montes MD.        Pager: [phone] - Office: [phone]

## 2022-11-13 NOTE — PROGRESS NOTES
Occupational Therapy  Facility/Department: [facility]  Occupational Therapy Initial Assessment    Name: Misha Marvin  : 1950  MRN: [id number]  Date of Service: 2022    Discharge Recommendations: Pt unsafe to return to PLOF, pain limiting nearly all mobility and most ADL's, could not sit EOB this date, roommate unable to assist much at home per pt. Patient would benefit from continued therapy after discharge          Patient Diagnosis(es): The encounter diagnosis was Sepsis without acute organ dysfunction, due to unspecified organism [facility]). Past Medical History:  has a past medical history of Abnormal EKG, Bladder tumor, CAD (coronary artery disease), Clinical trial participant at discharge, COPD (chronic obstructive pulmonary disease) ([facility] [address] Depression, GERD (gastroesophageal reflux disease), Hearing loss, History of blood transfusion, Hyperlipidemia, Hypertension, Panic attacks, Prostate hypertrophy, Under care of team, and Wellness examination. Past Surgical History:  has a past surgical history that includes Tonsillectomy and adenoidectomy (child); Cystoscopy; lipoma resection (child); Cystoscopy (01/15/2016); Coronary artery bypass graft (2016); Cardiac defibrillator placement (2016); hernia repair (Right); bladder tumor excision (01/15/2016); Cystocopy (10/26/2022); Cystoscopy (N/A, 10/26/2022); and Coronary artery bypass graft (2016). Assessment   Performance deficits / Impairments: Decreased functional mobility ; Decreased ADL status; Decreased endurance;Decreased balance;Decreased high-level IADLs;Decreased posture  Prognosis: Good  Decision Making: Medium Complexity  REQUIRES OT FOLLOW-UP: Yes  Activity Tolerance  Activity Tolerance: Patient limited by fatigue;Patient limited by pain        Plan   Occupational Therapy Plan  Times Per Week: 3-4x     Restrictions  Restrictions/Precautions  Restrictions/Precautions: Fall Risk, General Precautions, Up as Tolerated  Required Braces or Orthoses?: Yes  Implants present? : Pacemaker  Required Braces or Orthoses  Left Lower Extremity Brace: Boot  LLE Brace Type: Ortho MD's dropped off CAM boot during OT session for LLE during OOB activities per verbalization  Position Activity Restriction  Other position/activity restrictions: up with A    Subjective   General  Patient assessed for rehabilitation services?: Yes  Family / Caregiver Present: No  Diagnosis: L septic arthritis vs crystal arthropathy vs gout, BLE weakness, hx of CBAG, 2 bladder mass removals  Subjective  Subjective: 10/10 at B/L feet at rest per pt-acute pain, pt also states chronic neck/back pain this date  General Comment  Comments: RN apporved OT eval this date, pt cooperative despite being limited by pain     Social/Functional History  Social/Functional History  Lives With: Friend(s) (Dianne)  Type of Home: House  Home Layout: Two level, 1/2 bath on main level, Bed/Bath upstairs  Home Access: Stairs to enter without rails  Entrance Stairs - Number of Steps: 3  Bathroom Shower/Tub: Tub/Shower unit  Bathroom Toilet: Handicap height  Bathroom Equipment: Grab bars in shower  Home Equipment: [place], 4 wheeled (uses cane at all times)  ADL Assistance: [address] [place] [address] Avenue: Independent  Homemaking Responsibilities: Yes  Ambulation Assistance: Independent (with cane)  Transfer Assistance: Independent  Active : No  Patient's  Info: Pt cut self off from driving 4 months ago d/t dizziness  Mode of Transportation: Friends  Occupation: Retired  Leisure & Hobbies: tv  Additional Comments: Friend/roomate suffers from alcoholism and likely not able to assist pt at d/c       Objective   SpO2: 92 %  O2 Device: None (Room air)             Safety Devices  Type of Devices: Patient at risk for falls; Left in bed;Call light within reach;Nurse notified  Restraints  Restraints Initially in Place: No  Bed Mobility Training  Bed Mobility Training: Training;Precautions  Education Provided Comments: Bed mobility, ice only for 30 min max, importance of elevating limbs  Education Method: Verbal;Demonstration  Barriers to Learning: Vision; Hearing  Education Outcome: Continued education needed                  AM-PAC Score        AM-PAC Inpatient Daily Activity Raw Score: 15 (11/13/22 1154)  AM-PAC Inpatient ADL T-Scale Score : 34.69 (11/13/22 1154)  ADL Inpatient CMS 0-100% Score: 56.46 (11/13/22 1154)  ADL Inpatient CMS G-Code Modifier : CK (11/13/22 1154)    Goals  Short Term Goals  Time Frame for Short Term Goals: Pt will by discharge  Short Term Goal 1: demo sitting on EOB unsupported for 5 min+ at mod A during func activity  Short Term Goal 2: demo mod Ax1 for all bed mobility using bed rails PRN  Short Term Goal 3: identify 2 non-pharmacological pain-relieving techniques with 1 cue  Short Term Goal 4: demo ADL UB bathing/dressing activity with increased time and SBA  Short Term Goal 5: demo ADL LB bathing/dressing activity with increased time, AE PRN and mod A  Short Term Goal 6: notify OTR to update goals as pt's mobility increases, etc.       Therapy Time   Individual Concurrent Group Co-treatment   Time In 0928         Time Out 0958         Minutes 30         Timed Code Treatment Minutes: 25 Minutes       Korin Maxwell, OTR/L

## 2022-11-13 NOTE — PLAN OF CARE
Problem: Respiratory - Adult  Goal: Achieves optimal ventilation and oxygenation  11/13/2022 1259 by Chaparro Cullen RCP  Outcome: Progressing  BRONCHOSPASM/BRONCHOCONSTRICTION     [x]         IMPROVE AERATION/BREATH SOUNDS  [x]   ADMINISTER BRONCHODILATOR THERAPY AS APPROPRIATE  [x]   ASSESS BREATH SOUNDS  []   IMPLEMENT AEROSOL/MDI PROTOCOL  [x]   PATIENT EDUCATION AS NEEDED

## 2022-11-13 NOTE — PLAN OF CARE
Problem: Discharge Planning  Goal: Discharge to home or other facility with appropriate resources  11/13/2022 0152 by Hamilton Hayes RN  Outcome: Progressing  11/12/2022 1732 by Christian Massey RN  Outcome: Progressing     Problem: Pain  Goal: Verbalizes/displays adequate comfort level or baseline comfort level  11/13/2022 0152 by Hamilton Hayes RN  Outcome: Progressing  11/12/2022 1732 by Christian Massey RN  Outcome: Progressing     Problem: Safety - Adult  Goal: Free from fall injury  11/13/2022 0152 by Hamilton Hayes RN  Outcome: Progressing  11/12/2022 1732 by Christian Massey RN  Outcome: Progressing     Problem: ABCDS Injury Assessment  Goal: Absence of physical injury  11/12/2022 1732 by Christian Massey RN  Outcome: Progressing     Problem: Respiratory - Adult  Goal: Achieves optimal ventilation and oxygenation  11/13/2022 0152 by Hamilton Hayes RN  Outcome: Progressing  11/12/2022 1732 by Christian Massey RN  Outcome: Progressing

## 2022-11-13 NOTE — PROGRESS NOTES
[facility]  Office: [address], DO, Shannan , DO, Tonya Sender, DO, Rex December Blood, DO, Ariana Valle MD, Jj Luna MD, Aftab Bartlett MD, Diane Villa MD,  Delfino Perkins MD, Tracey Márquez MD, Dariela Mariscal, DO, Melene Boast, MD,  Cynthia Roca MD, Mike Barker MD, Jaron Manzo, DO, Quinton Hernandez MD, Juju Ortega MD, Yasmin Ford DO, Bradley Mendez MD, Arian Claudio MD, Dung Fragoso MD, Deloris Keller MD, Fernand Dance, DO, Elías Ayon MD, Nell Stover MD, Brain West, CNP,  Gilson Ortiz, CNP, Juarez Nelson, CNP, Nubia Moran, CNP,  Erik Rosales, [facility], Luigi Brandt, CNP, Reed Martinez, CNP, María Rincon, CNP, Sesar Ball, CNP, César Gonzalez, CNP, Ascencion Avila PA-C, Rocio Harmon, CNS, Baljeet Dixon, [facility], Milind Alcantar, CNP, Jazzmine Hartman, CNP, Lucas Saucedo, CNP         Jerardo Rojas 19    Progress Note    11/13/2022    10:08 AM    Name:   Matheus Solis  MRN:     [id number]     Acct:      [de-identified]   Room:   2005/2005-01  IP Day:  2  Admit Date:  11/11/2022  3:49 PM    PCP:   Alessandro Bull MD  Code Status:  Full Code    Subjective:     C/C:   Chief Complaint   Patient presents with    Ankle Pain     Interval History Status: not changed. Pt still having some pain but feels like it is a little bit better than yesterday. He is still having some lower extremity weakness bilaterally. No fevers, chills, nausea, vomiting, diarrhea    Brief History: This is a 70year old male who presents to our hospital with complaints of left foot pain. CT of his left foot showed no acute osseous abnormality and normal midfoot alignment but did show subcutaneous edema without any drainable  fluid collection or soft tissue gas. Pt was febrile with Tmax 100.8, with WBC 17 and left shift. There was concern for septic arthritis.  Orthopedic surgery was consulted, he has aspiration of joint which showed white count 11,937, 96% neutrophil count. Gram stain/culture and crystals were ordered and pending: He was seen by Infectious disease and started him on vancomycin. A CT with contrast of his lumbar spine was done due to leg weakness and concern for possible abscess which showed:     Review of Systems:     Constitutional:  negative for chills, fevers, sweats  Respiratory:  negative for cough, dyspnea on exertion, shortness of breath, wheezing  Cardiovascular:  negative for chest pain, chest pressure/discomfort, lower extremity edema, palpitations  Gastrointestinal:  negative for abdominal pain, constipation, diarrhea, nausea, vomiting  Neurological:  negative for dizziness, headache, he is complaining of bilateral lower extremity leg weakness no urinary tension  EXT: admits to severe pain in left foot making it difficult to ambulate extremity     Medications:      Allergies:  No Known Allergies    Current Meds:   Scheduled Meds:    atorvastatin  40 mg Oral Nightly    buPROPion  450 mg Oral QAM    [Held by provider] clopidogrel  75 mg Oral Daily    fenofibrate  54 mg Oral Daily    budesonide-formoterol  2 puff Inhalation BID    furosemide  40 mg Oral Daily    lisinopril  2.5 mg Oral Daily    metoprolol tartrate  25 mg Oral BID    traZODone  150 mg Oral Nightly    vilazodone HCl  20 mg Oral Daily    sodium chloride flush  5-40 mL IntraVENous 2 times per day    enoxaparin  40 mg SubCUTAneous Daily    vancomycin (VANCOCIN) intermittent dosing (placeholder)   Other RX Placeholder    gabapentin  100 mg Oral TID    sennosides-docusate sodium  2 tablet Oral Daily    LORazepam  1 mg Oral Once    ARIPiprazole  10 mg Oral Daily    vancomycin  1,000 mg IntraVENous Q12H     Continuous Infusions:    sodium chloride 25 mL/hr at 11/13/22 0213     PRN Meds: hydrOXYzine HCl, sodium chloride flush, sodium chloride, acetaminophen **OR** acetaminophen, albuterol, oxyCODONE    Data:     Past Medical History:   has a past medical history of Abnormal EKG, Bladder tumor, CAD (coronary artery disease), Clinical trial participant at discharge, COPD (chronic obstructive pulmonary disease) ([address] Depression, GERD (gastroesophageal reflux disease), Hearing loss, History of blood transfusion, Hyperlipidemia, Hypertension, Panic attacks, Prostate hypertrophy, Under care of team, and Wellness examination. Social History:   reports that he quit smoking about a year ago. His smoking use included cigars and cigarettes. He started smoking about 56 years ago. He smoked an average of .5 packs per day. He quit smokeless tobacco use about 6 years ago. His smokeless tobacco use included snuff and chew. He reports current alcohol use of about 7.0 standard drinks per week. He reports current drug use. Drug: Marijuana Shellye Burkitt). Family History:   Family History   Problem Relation Age of Onset    Other Mother         blind    Heart Surgery Mother     Cancer Father         leukemia       Vitals:  /76   Pulse 76   Temp 97.8 °F (36.6 °C) (Oral)   Resp 14   Ht 5' 9\" (1.753 m)   Wt 202 lb 6.1 oz (91.8 kg)   SpO2 92%   BMI 29.89 kg/m²   Temp (24hrs), Av.4 °F (36.9 °C), Min:97.8 °F (36.6 °C), Max:99.4 °F (37.4 °C)    No results for input(s): POCGLU in the last 72 hours. I/O (24Hr):     Intake/Output Summary (Last 24 hours) at 2022 1008  Last data filed at 2022 1815  Gross per 24 hour   Intake 350 ml   Output --   Net 350 ml       Labs:  Hematology:  Recent Labs     22  18122  0219   WBC 17.7*  --   --    RBC 5.56  --   --    HGB 13.3  --   --    HCT 42.5  --   --    MCV 76.4*  --   --    MCH 23.9*  --   --    MCHC 31.3  --   --    RDW 16.6*  --   --    *  --   --    MPV 10.1  --   --    SEDRATE 117*  --   --    .1*  --   --    INR  --  1.1 1.2       Chemistry:  Recent Labs     22  1818 22  2042 22  0219     --  138   K 4.0  --  3.6*   --  105   CO2 26  --  22   GLUCOSE 130*  --  102*   BUN 15  --  16   CREATININE 1.03  --  0.89   ANIONGAP 13  --  11   LABGLOM >60  --  >60   CALCIUM 9.8  --  8.7   PSA  --   --  0.88   CKTOTAL  --  57  --        Recent Labs     11/11/22  1818 11/11/22  2042 11/12/22  0219   PROT 7.7  --  6.3*   LABALBU 3.7  --  2.6*   TSH  --  0.36  --    AST 14  --  11   ALT 8  --  <5*   ALKPHOS 89  --  70   BILITOT 0.8  --  0.6   URICACID  --  5.6  --        ABG:  Lab Results   Component Value Date/Time    POCPH 7.41 03/28/2016 05:31 AM    POCPCO2 37 03/28/2016 05:31 AM    POCPO2 105 03/28/2016 05:31 AM    POCHCO3 23.3 03/28/2016 05:31 AM    NBEA 1 03/28/2016 05:31 AM    PBEA NOT REPORTED 03/28/2016 05:31 AM    ZOG0IQU 24 03/28/2016 05:31 AM    CSNB3RZE 98 03/28/2016 05:31 AM    FIO2 40.0 03/28/2016 05:31 AM     Lab Results   Component Value Date/Time    SPECIAL LEFT AC 3ML 11/12/2022 02:19 AM    SPECIAL LEFT FOREARM 2ML 11/12/2022 02:19 AM     Lab Results   Component Value Date/Time    CULTURE PENDING 11/12/2022 08:54 AM       Radiology:  XR ANKLE LEFT (MIN 3 VIEWS)    Result Date: 11/12/2022  No acute osseous or soft tissue abnormality. XR FOOT LEFT (MIN 3 VIEWS)    Result Date: 11/12/2022  Soft tissue swelling without acute osseous abnormality. XR CHEST PORTABLE    Result Date: 11/11/2022  No acute cardiopulmonary findings. CT FOOT LEFT WO CONTRAST    Result Date: 11/11/2022  1. No acute osseous abnormality. Normal midfoot alignment. 2. Nonspecific subcutaneous edema. No drainable fluid collection or soft tissue gas.        Physical Examination:        General appearance:  alert, cooperative and no distress  Mental Status:  oriented to person, place and time and normal affect  Lungs:  clear to auscultation bilaterally, normal effort  Heart:  regular rate and rhythm, no murmur  Abdomen:  soft, nontender, nondistended, normal bowel sounds, no masses, hepatomegaly, splenomegaly  Extremities:  no edema, redness, tenderness in the calves. There is  pain with palpation of left leg, he is restricted with active and passive movement. Muscle strength 4 out of 5 bilaterally  Skin:  no gross lesions, rashes, induration there is some erythema noted on left leg    Assessment:        Hospital Problems             Last Modified POA    * (Principal) Septic arthritis of left ankle ([facility] [address] 11/12/2022 Yes    S/P cardiac cath- Multivessel CAD 3/24/16-Dr. winters 11/12/2022 Yes    Sepsis ([address] 11/12/2022 Yes    Acute left ankle pain 11/12/2022 Yes    Left leg weakness 11/12/2022 Yes    Ischemic cardiomyopathy 11/12/2022 Yes    Class 1 obesity due to excess calories with serious comorbidity and body mass index (BMI) of 31.0 to 31.9 in adult 11/12/2022 Yes    COPD (chronic obstructive pulmonary disease) ([facility] [address] 11/12/2022 Yes    Monoarticular arthritis 11/12/2022 Yes    Fall 11/12/2022 Yes    Hyperlipidemia 11/12/2022 Yes    S/P CABG x 4 11/12/2022 Yes    Primary hypertension 11/12/2022 Yes    Microcytosis 11/12/2022 Yes    Acute hypokalemia 11/12/2022 Yes      Plan:        SIRS with Left ankle pain and swelling concerning for septic arthritis vs inflammatory: ESR/CRP elevated. Aspiration of joint : WBC: 11K, 96% neutrophils. Follow up on Culture, gram stain crystal analysis. ID/Ortho following. Continue Vancomycin  Left leg weakness: Check CT lumbar spine, cannot get MRI due to AICD being non compatible. PTOT  Microcytosis without anemia: Tsat: 7%. Start iron supplementation. Acute Hypokalemia: replace potassium   BPH: No retention. History of ischemic cardiomyopathy with EF of 20 to 25% on echo from 2016: Currently appears compensated. He has AICD in place. Restart home lasix, lisinopril and BB. History of coronary artery disease s/p CABG x4: Hold plavix incase pt needs surgical intervention continue lipitor. COPD: Stable. No exacerbation  Primary HTN: stable.    Thyroid follow up outpt    Dispo: Plan for d/c pending final fluid culture results and CT.  Needs PTOT  Amy Age, DO  11/13/2022  10:08 AM

## 2022-11-14 ENCOUNTER — APPOINTMENT (OUTPATIENT)
Dept: CT IMAGING | Age: 72
DRG: 553 | End: 2022-11-14
Payer: MEDICARE

## 2022-11-14 PROBLEM — M11.279: Status: ACTIVE | Noted: 2022-11-12

## 2022-11-14 PROBLEM — R65.10 SIRS (SYSTEMIC INFLAMMATORY RESPONSE SYNDROME) (HCC): Status: ACTIVE | Noted: 2022-11-14

## 2022-11-14 LAB
GLUCOSE BLD-MCNC: 90 MG/DL (ref 75–110)
MAGNESIUM: 1.9 MG/DL (ref 1.6–2.6)
POTASSIUM SERPL-SCNC: 4 MMOL/L (ref 3.7–5.3)

## 2022-11-14 PROCEDURE — 6370000000 HC RX 637 (ALT 250 FOR IP): Performed by: NURSE PRACTITIONER

## 2022-11-14 PROCEDURE — 6370000000 HC RX 637 (ALT 250 FOR IP): Performed by: INTERNAL MEDICINE

## 2022-11-14 PROCEDURE — 94640 AIRWAY INHALATION TREATMENT: CPT

## 2022-11-14 PROCEDURE — 36415 COLL VENOUS BLD VENIPUNCTURE: CPT

## 2022-11-14 PROCEDURE — 94664 DEMO&/EVAL PT USE INHALER: CPT

## 2022-11-14 PROCEDURE — 97163 PT EVAL HIGH COMPLEX 45 MIN: CPT

## 2022-11-14 PROCEDURE — 83735 ASSAY OF MAGNESIUM: CPT

## 2022-11-14 PROCEDURE — 99232 SBSQ HOSP IP/OBS MODERATE 35: CPT | Performed by: INTERNAL MEDICINE

## 2022-11-14 PROCEDURE — 2580000003 HC RX 258: Performed by: NURSE PRACTITIONER

## 2022-11-14 PROCEDURE — 6360000002 HC RX W HCPCS: Performed by: INTERNAL MEDICINE

## 2022-11-14 PROCEDURE — 2060000000 HC ICU INTERMEDIATE R&B

## 2022-11-14 PROCEDURE — 72132 CT LUMBAR SPINE W/DYE: CPT

## 2022-11-14 PROCEDURE — 84132 ASSAY OF SERUM POTASSIUM: CPT

## 2022-11-14 PROCEDURE — APPSS30 APP SPLIT SHARED TIME 16-30 MINUTES: Performed by: NURSE PRACTITIONER

## 2022-11-14 PROCEDURE — 82947 ASSAY GLUCOSE BLOOD QUANT: CPT

## 2022-11-14 PROCEDURE — 97110 THERAPEUTIC EXERCISES: CPT

## 2022-11-14 PROCEDURE — 6360000004 HC RX CONTRAST MEDICATION: Performed by: INTERNAL MEDICINE

## 2022-11-14 PROCEDURE — 94761 N-INVAS EAR/PLS OXIMETRY MLT: CPT

## 2022-11-14 PROCEDURE — 6360000002 HC RX W HCPCS: Performed by: NURSE PRACTITIONER

## 2022-11-14 RX ORDER — COLCHICINE 0.6 MG/1
1.2 TABLET ORAL ONCE
Status: DISCONTINUED | OUTPATIENT
Start: 2022-11-14 | End: 2022-11-18 | Stop reason: HOSPADM

## 2022-11-14 RX ORDER — COLCHICINE 0.6 MG/1
0.6 TABLET ORAL 2 TIMES DAILY
Status: DISCONTINUED | OUTPATIENT
Start: 2022-11-15 | End: 2022-11-18 | Stop reason: HOSPADM

## 2022-11-14 RX ORDER — MORPHINE SULFATE 2 MG/ML
2 INJECTION, SOLUTION INTRAMUSCULAR; INTRAVENOUS ONCE
Status: COMPLETED | OUTPATIENT
Start: 2022-11-14 | End: 2022-11-14

## 2022-11-14 RX ORDER — COLCHICINE 0.6 MG/1
0.6 TABLET ORAL ONCE
Status: COMPLETED | OUTPATIENT
Start: 2022-11-14 | End: 2022-11-14

## 2022-11-14 RX ORDER — LORAZEPAM 2 MG/ML
0.5 INJECTION INTRAMUSCULAR ONCE
Status: COMPLETED | OUTPATIENT
Start: 2022-11-14 | End: 2022-11-14

## 2022-11-14 RX ADMIN — OXYCODONE 5 MG: 5 TABLET ORAL at 03:12

## 2022-11-14 RX ADMIN — DESMOPRESSIN ACETATE 40 MG: 0.2 TABLET ORAL at 21:14

## 2022-11-14 RX ADMIN — GABAPENTIN 100 MG: 100 CAPSULE ORAL at 08:12

## 2022-11-14 RX ADMIN — BUPROPION HYDROCHLORIDE 450 MG: 150 TABLET, EXTENDED RELEASE ORAL at 08:12

## 2022-11-14 RX ADMIN — LORAZEPAM 0.5 MG: 2 INJECTION INTRAMUSCULAR; INTRAVENOUS at 23:16

## 2022-11-14 RX ADMIN — VILAZODONE HYDROCHLORIDE 20 MG: 20 TABLET ORAL at 08:12

## 2022-11-14 RX ADMIN — BUDESONIDE AND FORMOTEROL FUMARATE DIHYDRATE 2 PUFF: 160; 4.5 AEROSOL RESPIRATORY (INHALATION) at 08:06

## 2022-11-14 RX ADMIN — FENOFIBRATE 54 MG: 54 TABLET ORAL at 08:12

## 2022-11-14 RX ADMIN — SODIUM CHLORIDE, PRESERVATIVE FREE 10 ML: 5 INJECTION INTRAVENOUS at 10:28

## 2022-11-14 RX ADMIN — SODIUM CHLORIDE, PRESERVATIVE FREE 10 ML: 5 INJECTION INTRAVENOUS at 21:14

## 2022-11-14 RX ADMIN — METOPROLOL TARTRATE 25 MG: 25 TABLET ORAL at 21:14

## 2022-11-14 RX ADMIN — GABAPENTIN 100 MG: 100 CAPSULE ORAL at 21:14

## 2022-11-14 RX ADMIN — IOPAMIDOL 75 ML: 755 INJECTION, SOLUTION INTRAVENOUS at 12:03

## 2022-11-14 RX ADMIN — OXYCODONE 5 MG: 5 TABLET ORAL at 21:14

## 2022-11-14 RX ADMIN — ENOXAPARIN SODIUM 40 MG: 100 INJECTION SUBCUTANEOUS at 09:00

## 2022-11-14 RX ADMIN — LISINOPRIL 2.5 MG: 2.5 TABLET ORAL at 08:12

## 2022-11-14 RX ADMIN — OXYCODONE 5 MG: 5 TABLET ORAL at 08:16

## 2022-11-14 RX ADMIN — Medication 1000 MG: at 02:03

## 2022-11-14 RX ADMIN — COLCHICINE 0.6 MG: 0.6 TABLET, FILM COATED ORAL at 16:18

## 2022-11-14 RX ADMIN — METOPROLOL TARTRATE 25 MG: 25 TABLET ORAL at 08:12

## 2022-11-14 RX ADMIN — GABAPENTIN 100 MG: 100 CAPSULE ORAL at 16:18

## 2022-11-14 RX ADMIN — HYDROXYZINE HYDROCHLORIDE 25 MG: 25 TABLET ORAL at 16:18

## 2022-11-14 RX ADMIN — DOCUSATE SODIUM 50 MG AND SENNOSIDES 8.6 MG 2 TABLET: 8.6; 5 TABLET, FILM COATED ORAL at 08:12

## 2022-11-14 RX ADMIN — HYDROMORPHONE HYDROCHLORIDE 1 MG: 1 INJECTION, SOLUTION INTRAMUSCULAR; INTRAVENOUS; SUBCUTANEOUS at 11:39

## 2022-11-14 RX ADMIN — TRAZODONE HYDROCHLORIDE 150 MG: 50 TABLET ORAL at 21:14

## 2022-11-14 RX ADMIN — BUDESONIDE AND FORMOTEROL FUMARATE DIHYDRATE 2 PUFF: 160; 4.5 AEROSOL RESPIRATORY (INHALATION) at 20:38

## 2022-11-14 RX ADMIN — MORPHINE SULFATE 2 MG: 2 INJECTION, SOLUTION INTRAMUSCULAR; INTRAVENOUS at 22:41

## 2022-11-14 ASSESSMENT — PAIN SCALES - GENERAL
PAINLEVEL_OUTOF10: 5
PAINLEVEL_OUTOF10: 6
PAINLEVEL_OUTOF10: 10
PAINLEVEL_OUTOF10: 0
PAINLEVEL_OUTOF10: 10
PAINLEVEL_OUTOF10: 10
PAINLEVEL_OUTOF10: 9
PAINLEVEL_OUTOF10: 0

## 2022-11-14 ASSESSMENT — PAIN DESCRIPTION - LOCATION
LOCATION: FOOT;ANKLE
LOCATION: FOOT;ANKLE

## 2022-11-14 ASSESSMENT — PAIN DESCRIPTION - ORIENTATION: ORIENTATION: LEFT

## 2022-11-14 ASSESSMENT — PAIN DESCRIPTION - DESCRIPTORS: DESCRIPTORS: ACHING

## 2022-11-14 NOTE — CARE COORDINATION
Met with patient to discuss transitional planning. He would like to go to SNF at discharge. List provided to patient. He does not have his glasses with him. He would like his girlfriend, Yannick Guzman, notified. Spoke to Lanier Coma via telephone. She will not visit patient today but plans to tomorrow. Referral to [facility] per Alicja's request    [phone] notified by Malcolm Ball from [facility] that they do not have beds available. SNF list sent to [facility] email address at [email or web address]. [employer]    [phone] received call from Yannick Guzman. Her choices are [address] [place] [address] Manuelito Garza.  Referrals sent

## 2022-11-14 NOTE — CONSULTS
Department of Neurosurgery                                            Nurse Practitioner Consult Note      Reason for Consult:  back pain and leg weakness  Requesting Physician:  Tom Killian DO  Neurosurgeon:   [] Dr. Gustavo Fu  [x] Dr. Zoë Lange  [] Dr. Dahlia Roca  [] Dr. Harvest Najjar      History Obtained From:  patient, electronic medical record    CHIEF COMPLAINT:         Chief Complaint   Patient presents with    Ankle Pain       HISTORY OF PRESENT ILLNESS:       The patient is a 70 y.o. male who presents on 11/11 with left ankle pain. Concern for septic arthritis s/p left ankle aspirate by ortho. Aspirate positive for calcium pyrophosphate crystals, cultures negative. Complaining of low back pain so CT lumbar was done for concern of infections. Unable to get MRI due to AICD. CT lumbar spine showed sever lumbar stenosis.  and . Take plavix at home for history of CABG x3. EF 20-25% Plavix on hold last dose 2 days ago  Currently complaining of low back pain across entire low back. Unable to sit up in bed. Yelled out in pain when attempted to sit HOB up. Unable to test lower extremity strength due to pain in legs and back with any movement. States he was able to sit up on edge of bed yesterday.      PAST MEDICAL HISTORY :       Past Medical History:        Diagnosis Date    Abnormal EKG     anterior fascicular block    Bladder tumor     CAD (coronary artery disease)     Clinical trial participant at discharge 03/24/2016    disqualified 3/24/2016    COPD (chronic obstructive pulmonary disease) ([facility] [address]     Depression     GERD (gastroesophageal reflux disease)     Hearing loss     History of blood transfusion     Hyperlipidemia     Hypertension 2012    Panic attacks     Prostate hypertrophy     Under care of team     Dr. Zee Walker, cardiology    Wellness examination     -PCP seen in Dec. 2019       Past Surgical History:        Procedure Laterality Date    BLADDER TUMOR EXCISION  01/15/2016 TURB with gyrus    CARDIAC DEFIBRILLATOR PLACEMENT  2016    Unsafe Pacemaker. [place] 9881 ICD Model # [id number]. RV Lead [employer] [place] SG Model # [id number]    CORONARY ARTERY BYPASS GRAFT  03/27/2016    X 4 Emergent    CORONARY ARTERY BYPASS GRAFT  2016    CYSTOSCOPY      CYSTOSCOPY  01/15/2016    CYSTOSCOPY  10/26/2022    CYSTOSCOPY BLADDER BIOPSY, FULGARATION    CYSTOSCOPY N/A 10/26/2022    CYSTOSCOPY BLADDER BIOPSY, FULGARATION performed by Fiordaliza Ford MD at [place] [address] Right     LIPOMA RESECTION  child    anterior neck    TONSILLECTOMY AND ADENOIDECTOMY  child       Social History:   Social History     Socioeconomic History    Marital status:      Spouse name: Not on file    Number of children: 1    Years of education: Not on file    Highest education level: Not on file   Occupational History    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   Tobacco Use    Smoking status: Former     Packs/day: 0.50     Types: [de-identified], Cigarettes     Start date: 1966     Quit date: 2021     Years since quittin.0    Smokeless tobacco: Former     Types: Snuff, Chew     Quit date: 3/23/2016   Vaping Use    Vaping Use: Never used   Substance and Sexual Activity    Alcohol use: Yes     Alcohol/week: 7.0 standard drinks     Types: 7 Cans of beer per week     Comment: occasionally    Drug use: Yes     Types: Marijuana Dietra Due)    Sexual activity: Not on file   Other Topics Concern    Not on file   Social History Narrative    Not on file     Social Determinants of Health     Financial Resource Strain: Low Risk     Difficulty of Paying Living Expenses: Not hard at all   Food Insecurity: No Food Insecurity    Worried About [address] in the Last Year: Never true    [facility] in the Last Year: Never true   Transportation Needs: No Transportation Needs    Lack of Transportation (Medical): No    Lack of Transportation (Non-Medical):  No   Physical Activity: Insufficiently Active    Days of NIGHTLY 11/27/17   Radha Anderson MD   atorvastatin (LIPITOR) 40 MG tablet TAKE ONE TABLET BY MOUTH ONCE NIGHTLY 11/27/17   Radha Anderson MD   metoprolol tartrate (LOPRESSOR) 25 MG tablet TAKE ONE-HALF TABLET BY MOUTH TWO TIMES A DAY 11/27/17   Rahda Anderson, MD   buPROPion (WELLBUTRIN XL) 150 MG extended release tablet TAKE ONE TABLET BY MOUTH EVERY MORNING  Patient taking differently: Take 450 mg by mouth every morning 9/21/17   Radha Anderson MD   hydrOXYzine (ATARAX) 25 MG tablet Take 25 mg by mouth 3 times daily as needed    Historical Provider, MD   potassium chloride (MICRO-K) 10 MEQ CR capsule Take 10 mEq by mouth 2 times daily    Historical Provider, MD   furosemide (LASIX) 20 MG tablet Take 40 mg by mouth daily     Historical Provider, MD       Current Medications:   Current Facility-Administered Medications: colchicine (COLCRYS) tablet 1.2 mg, 1.2 mg, Oral, Once  colchicine (COLCRYS) tablet 0.6 mg, 0.6 mg, Oral, Once  [START ON 11/15/2022] colchicine (COLCRYS) tablet 0.6 mg, 0.6 mg, Oral, BID  ferrous sulfate (FE TABS 325) EC tablet 325 mg, 325 mg, Oral, Every Other Day  potassium chloride (KLOR-CON M) extended release tablet 40 mEq, 40 mEq, Oral, PRN **OR** potassium bicarb-citric acid (EFFER-K) effervescent tablet 40 mEq, 40 mEq, Oral, PRN **OR** potassium chloride 10 mEq/100 mL IVPB (Peripheral Line), 10 mEq, IntraVENous, PRN  magnesium sulfate 1000 mg in dextrose 5% 100 mL IVPB, 1,000 mg, IntraVENous, PRN  polyethylene glycol (GLYCOLAX) packet 17 g, 17 g, Oral, Daily  atorvastatin (LIPITOR) tablet 40 mg, 40 mg, Oral, Nightly  buPROPion (WELLBUTRIN XL) extended release tablet 450 mg, 450 mg, Oral, QAM  [Held by provider] clopidogrel (PLAVIX) tablet 75 mg, 75 mg, Oral, Daily  fenofibrate (TRICOR) tablet 54 mg, 54 mg, Oral, Daily  budesonide-formoterol (SYMBICORT) 160-4.5 MCG/ACT inhaler 2 puff, 2 puff, Inhalation, BID  furosemide (LASIX) tablet 40 mg, 40 mg, Oral, Daily  hydrOXYzine HCl (ATARAX) tablet 25 mg, 25 mg, Oral, TID PRN  lisinopril (PRINIVIL;ZESTRIL) tablet 2.5 mg, 2.5 mg, Oral, Daily  metoprolol tartrate (LOPRESSOR) tablet 25 mg, 25 mg, Oral, BID  traZODone (DESYREL) tablet 150 mg, 150 mg, Oral, Nightly  vilazodone HCl (VIIBRYD) TABS 20 mg, 20 mg, Oral, Daily  sodium chloride flush 0.9 % injection 5-40 mL, 5-40 mL, IntraVENous, 2 times per day  sodium chloride flush 0.9 % injection 5-40 mL, 5-40 mL, IntraVENous, PRN  0.9 % sodium chloride infusion, , IntraVENous, PRN  enoxaparin (LOVENOX) injection 40 mg, 40 mg, SubCUTAneous, Daily  acetaminophen (TYLENOL) tablet 650 mg, 650 mg, Oral, Q6H PRN **OR** acetaminophen (TYLENOL) suppository 650 mg, 650 mg, Rectal, Q6H PRN  albuterol (PROVENTIL) nebulizer solution 2.5 mg, 2.5 mg, Nebulization, As Directed RT PRN  oxyCODONE (ROXICODONE) immediate release tablet 5 mg, 5 mg, Oral, Q4H PRN  gabapentin (NEURONTIN) capsule 100 mg, 100 mg, Oral, TID  sennosides-docusate sodium (SENOKOT-S) 8.6-50 MG tablet 2 tablet, 2 tablet, Oral, Daily  ARIPiprazole (ABILIFY) tablet 10 mg, 10 mg, Oral, Daily  Facility-Administered Medications Ordered in Other Encounters: lactated ringers infusion 1,000 mL, 1,000 mL, IntraVENous, Continuous  fentaNYL (SUBLIMAZE) injection 25 mcg, 25 mcg, IntraVENous, Q5 Min PRN  fentaNYL (SUBLIMAZE) injection 50 mcg, 50 mcg, IntraVENous, Q5 Min PRN    REVIEW OF SYSTEMS:       CONSTITUTIONAL: negative for fatigue and malaise   CARDIOVASCULAR: negative for chest pain, palpitations   GASTROINTESTINAL: negative for nausea, vomiting   GENITOURINARY: negative for incontinence   MUSCULOSKELETAL: negative for neck pain positive for back pain     Review of systems otherwise negative.     PHYSICAL EXAM:       /74   Pulse 82   Temp 97.9 °F (36.6 °C) (Oral)   Resp 20   Ht 5' 9\" (1.753 m)   Wt 200 lb 9.9 oz (91 kg)   SpO2 93%   BMI 29.63 kg/m²       CONSTITUTIONAL: no apparent distress, appears stated age   NEUROLOGIC:  EYE OPENING Spontaneous - 4 [x]       To voice - 3 []       To pain - 2 []       None - 1 []    VERBAL RESPONSE     Appropriate, oriented - 5 [x]       Dazed or confused - 4 []       Syllables, expletives - 3 []       Grunts - 2 []       None - 1 []    MOTOR RESPONSE     Spontaneous, command - 6 [x]      Localizes pain - 5 []       Withdraws pain - 4 []       Abnormal flexion - 3 []       Abnormal extension - 2 []       None - 1 []            Total GCS: 15    Mental Status:  alert, oriented x3, follows commands, speech clear     Motor Exam:    Tone:  normal    Motor exam is 5 out of 5 all extremities with the exception of bilat LE limited exam due to pain, wiggles toes     Sensory:    Right Upper Extremity:  normal  Left Upper Extremity:  normal  Right Lower Extremity:  normal  Left Lower Extremity:  normal       LABS AND IMAGING:     CBC with Differential:    Lab Results   Component Value Date/Time    WBC 17.7 11/11/2022 06:18 PM    RBC 5.56 11/11/2022 06:18 PM    RBC 4.85 02/13/2012 11:22 AM    HGB 13.3 11/11/2022 06:18 PM    HCT 42.5 11/11/2022 06:18 PM     11/11/2022 06:18 PM     02/13/2012 11:22 AM    MCV 76.4 11/11/2022 06:18 PM    MCH 23.9 11/11/2022 06:18 PM    MCHC 31.3 11/11/2022 06:18 PM    RDW 16.6 11/11/2022 06:18 PM    LYMPHOPCT 3 11/11/2022 06:18 PM    LYMPHOPCT 17.8 02/26/2015 12:00 AM    MONOPCT 9 11/11/2022 06:18 PM    MONOPCT 9.4 02/26/2015 12:00 AM    EOSPCT 2.5 02/26/2015 12:00 AM    BASOPCT 0 11/11/2022 06:18 PM    BASOPCT 1.0 02/26/2015 12:00 AM    MONOSABS 1.59 11/11/2022 06:18 PM    MONOSABS 1.2 02/26/2015 12:00 AM    LYMPHSABS 0.53 11/11/2022 06:18 PM    LYMPHSABS 2.3 02/26/2015 12:00 AM    EOSABS 0.00 11/11/2022 06:18 PM    EOSABS 0.3 02/26/2015 12:00 AM    BASOSABS 0.00 11/11/2022 06:18 PM    DIFFTYPE NOT REPORTED 05/19/2016 06:11 AM     BMP:    Lab Results   Component Value Date/Time     11/12/2022 02:19 AM    K 4.0 11/14/2022 05:27 AM     11/12/2022 02:19 AM    CO2 22 11/12/2022 02:19 AM    BUN 16 11/12/2022 02:19 AM    LABALBU 2.6 11/12/2022 02:19 AM    CREATININE 0.89 11/12/2022 02:19 AM    CALCIUM 8.7 11/12/2022 02:19 AM    GFRAA >60 12/26/2019 11:20 AM    LABGLOM >60 11/12/2022 02:19 AM    GLUCOSE 102 11/12/2022 02:19 AM    GLUCOSE 105 02/13/2012 11:22 AM       Radiology Review:    XR ANKLE LEFT (MIN 3 VIEWS)    Result Date: 11/12/2022  EXAMINATION: THREE XRAY VIEWS OF THE LEFT ANKLE 11/12/2022 6:51 am COMPARISON: None. HISTORY: ORDERING SYSTEM PROVIDED HISTORY: infection TECHNOLOGIST PROVIDED HISTORY: infection FINDINGS: There is no acute osseous abnormality. The joint spaces are maintained. The surrounding soft tissues are unremarkable. No acute osseous or soft tissue abnormality. XR ANKLE RIGHT (MIN 3 VIEWS)    Result Date: 11/13/2022  EXAMINATION: THREE XRAY VIEWS OF THE RIGHT ANKLE 11/13/2022 10:56 am COMPARISON: Right ankle radiographs performed 11/15/2008. HISTORY: ORDERING SYSTEM PROVIDED HISTORY: trauma TECHNOLOGIST PROVIDED HISTORY: trauma FINDINGS: There is no acute osseous abnormality. The joint spaces are maintained. The surrounding soft tissues are unremarkable. No acute osseous or soft tissue abnormality. XR FOOT LEFT (MIN 3 VIEWS)    Result Date: 11/12/2022  EXAMINATION: THREE XRAY VIEWS OF THE LEFT FOOT 11/12/2022 6:51 am COMPARISON: None. HISTORY: ORDERING SYSTEM PROVIDED HISTORY: infection TECHNOLOGIST PROVIDED HISTORY: infection FINDINGS: There is no acute osseous abnormality. The joint spaces are maintained. There is soft tissue swelling. Soft tissue swelling without acute osseous abnormality. XR FOOT RIGHT (MIN 3 VIEWS)    Result Date: 11/13/2022  EXAMINATION: THREE XRAY VIEWS OF THE RIGHT FOOT 11/13/2022 10:56 am COMPARISON: Right foot radiographs performed 11/15/2008 HISTORY: ORDERING SYSTEM PROVIDED HISTORY: trauma TECHNOLOGIST PROVIDED HISTORY: trauma FINDINGS: There is no acute osseous abnormality.   The joint spaces are maintained. The surrounding soft tissues are unremarkable. No acute osseous or soft tissue abnormality. CT LUMBAR SPINE W CONTRAST    Result Date: 11/14/2022  EXAMINATION: CT OF THE LUMBAR SPINE WITH CONTRAST  11/14/2022 11:58 am TECHNIQUE: CT of the lumbar spine was performed with the administration of intravenous contrast. Multiplanar reformatted images are provided for review. Automated exposure control, iterative reconstruction, and/or weight based adjustment of the mA/kV was utilized to reduce the radiation dose to as low as reasonably achievable. COMPARISON: Lumbar spine radiographs 11/08/2008 HISTORY: ORDERING SYSTEM PROVIDED HISTORY: concern for slipped disc as well as possible mass/psoas abbcess or lytic/blasic lesions suggesting underlying maligancy contributing to possible pathological fracture. TECHNOLOGIST PROVIDED HISTORY: concern for slipped disc as well as possible mass/psoas abbcess or lytic/blasic lesions suggesting underlying maligancy contributing to possible pathological fracture. FINDINGS: BONES/ALIGNMENT:  There is a levoconvex lumbar scoliosis. There is multilevel disc space narrowing and degenerative endplate sclerosis throughout the lower thoracic and lumbar spine. There is no spondylolysis. No lytic or blastic osseous lesions are identified. SOFT TISSUES: There is no paraspinal mass identified. There is no abnormal fluid collection identified. Severe atherosclerotic calcifications are present within the abdominal aorta. L1-L2: There is a disc bulge and osteophyte formation. There is bilateral facet arthropathy. There is severe left neural foraminal narrowing. No significant spinal canal stenosis or right neural foraminal narrowing is present. L2-L3: There is a disc bulge, facet arthropathy and thickening of the ligamentum flavum. There is severe left neural foraminal narrowing. There is no significant right neural foraminal narrowing.   Mild spinal canal stenosis is present. L3-L4: There is a disc bulge, facet arthropathy and thickening of the ligamentum flavum. , severe right and moderate left neural foraminal narrowing. There is moderate spinal canal stenosis. L4-L5: There is a disc bulge, facet arthropathy and thickening of the ligamentum flavum. There is severe spinal canal stenosis, severe right and mild left neural foraminal narrowing. L5-S1: There is a disc bulge, posterior osteophyte formation and facet arthropathy. There is no significant spinal canal stenosis. Severe bilateral neural foraminal narrowing is present. 1. No findings to suggest acute discitis/osteomyelitis. 2. No abscess identified. 3. Severe multilevel degenerative changes of the lumbar spine, as described above, but most pronounced at L4-5 where there is severe spinal canal stenosis, severe right and mild left neural foraminal narrowing. CT FOOT LEFT WO CONTRAST    Result Date: 11/11/2022  EXAMINATION: CT OF THE LEFT FOOT WITHOUT CONTRAST 11/11/2022 6:36 pm TECHNIQUE: CT of the left foot was performed without the administration of intravenous contrast.  Multiplanar reformatted images are provided for review. Automated exposure control, iterative reconstruction, and/or weight based adjustment of the mA/kV was utilized to reduce the radiation dose to as low as reasonably achievable. COMPARISON: None. HISTORY ORDERING SYSTEM PROVIDED HISTORY: possible tarsal avulsion seen on prior xray TECHNOLOGIST PROVIDED HISTORY: possible tarsal avulsion seen on prior xray Decision Support Exception - unselect if not a suspected or confirmed emergency medical condition->Emergency Medical Condition (MA) FINDINGS: The distal tibia and fibula are intact. No syndesmotic widening. The ankle mortise is intact. Mild chondrocalcinosis of the tibiotalar joint. The tibial plafond and talar dome are normal in appearance. The subtalar joint is unremarkable.   The talonavicular and calcaneocuboid joints are unremarkable. The naviculocuneiform articulations are unremarkable. Mild 2nd tarsometatarsal degenerative changes. Normal midfoot alignment is maintained. No Lisfranc interval widening. Mild chondrocalcinosis of the midfoot. The metatarsophalangeal and interphalangeal joints are intact. No fracture or dislocation identified. No osseous erosion or periosteal reaction. Mild atherosclerotic vascular calcifications. The visualized tendons are grossly intact. Dorsal mid and forefoot subcutaneous edema. Circumferential subcutaneous edema about the ankle. No soft tissue gas or drainable fluid collection. 1. No acute osseous abnormality. Normal midfoot alignment. 2. Nonspecific subcutaneous edema. No drainable fluid collection or soft tissue gas. ASSESSMENT AND PLAN:       Patient Active Problem List   Diagnosis    Essential hypertension    Hyperlipidemia    NSTEMI (non-ST elevated myocardial infarction) ([facility] [address]    Tobacco abuse    S/P cardiac cath- Multivessel CAD 3/24/16-Dr. winters    Acute hypokalemia    S/P CABG x 4    Coronary artery disease involving coronary bypass graft of native heart without angina pectoris    Anxiety    Sepsis ([facility] [address]    Acute left ankle pain    Pseudogout of ankle    Left leg weakness    Primary hypertension    Ischemic cardiomyopathy    Microcytosis    Class 1 obesity due to excess calories with serious comorbidity and body mass index (BMI) of 31.0 to 31.9 in adult    COPD (chronic obstructive pulmonary disease) ([facility])    Monoarticular arthritis    Fall    SIRS (systemic inflammatory response syndrome) ([facility])         A/P:  This is a 70 y.o. male with acute on chronic low back pain with bilat leg pain     Patient care will be discussed with attending, will reevaluated patient along with attending.      - No neurosurgical interventions at this time.  Recommend CT myelogram if patient agreeable   - activity as tolerated   - Hold all antiplatelets and anticoagulants for possible CT myelogram       Additional recommendations may follow    Please contact neurosurgery with any changes in patients neurologic status. Thank you for your consult.        PRESTON Burrell - CNP   NS pager [phone]  11/14/2022  2:20 PM

## 2022-11-14 NOTE — PLAN OF CARE
Problem: Respiratory - Adult  Goal: Achieves optimal ventilation and oxygenation  11/13/2022 2102 by Jaclyn Dc RCP  Outcome: Progressing  Flowsheets (Taken 11/13/2022 2102)  Achieves optimal ventilation and oxygenation:   Assess for changes in respiratory status   Respiratory therapy support as indicated   Assess for changes in mentation and behavior   Oxygen supplementation based on oxygen saturation or arterial blood gases   Encourage broncho-pulmonary hygiene including cough, deep breathe, incentive spirometry   Assess and instruct to report shortness of breath or any respiratory difficulty

## 2022-11-14 NOTE — PROGRESS NOTES
with Tmax 100.8, with WBC 17 and left shift. There was concern for septic arthritis. Orthopedic surgery was consulted, he has aspiration of joint which showed white count 11,937, 96% neutrophil count. Gram stain/culture and crystals were ordered and pending: He was seen by Infectious disease and started him on vancomycin. A CT with contrast of his lumbar spine was done due to leg weakness and concern for possible abscess which showed:     Review of Systems:     Constitutional:  negative for chills, fevers, sweats  Respiratory:  negative for cough, dyspnea on exertion, shortness of breath, wheezing  Cardiovascular:  negative for chest pain, chest pressure/discomfort, lower extremity edema, palpitations  Gastrointestinal:  negative for abdominal pain, constipation, diarrhea, nausea, vomiting  Neurological:  negative for dizziness, headache, he is complaining of bilateral lower extremity leg weakness no urinary tension  EXT: admits to severe pain in left foot making it difficult to ambulate extremity     Medications:      Allergies:  No Known Allergies    Current Meds:   Scheduled Meds:    colchicine  1.2 mg Oral Once    colchicine  0.6 mg Oral Once    [START ON 11/15/2022] colchicine  0.6 mg Oral BID    ferrous sulfate  325 mg Oral Every Other Day    polyethylene glycol  17 g Oral Daily    atorvastatin  40 mg Oral Nightly    buPROPion  450 mg Oral QAM    [Held by provider] clopidogrel  75 mg Oral Daily    fenofibrate  54 mg Oral Daily    budesonide-formoterol  2 puff Inhalation BID    furosemide  40 mg Oral Daily    lisinopril  2.5 mg Oral Daily    metoprolol tartrate  25 mg Oral BID    traZODone  150 mg Oral Nightly    vilazodone HCl  20 mg Oral Daily    sodium chloride flush  5-40 mL IntraVENous 2 times per day    enoxaparin  40 mg SubCUTAneous Daily    gabapentin  100 mg Oral TID    sennosides-docusate sodium  2 tablet Oral Daily    ARIPiprazole  10 mg Oral Daily     Continuous Infusions:    sodium chloride 25 mL/hr at 22 0213     PRN Meds: potassium chloride **OR** potassium alternative oral replacement **OR** potassium chloride, magnesium sulfate, hydrOXYzine HCl, sodium chloride flush, sodium chloride, acetaminophen **OR** acetaminophen, albuterol, oxyCODONE    Data:     Past Medical History:   has a past medical history of Abnormal EKG, Bladder tumor, CAD (coronary artery disease), Clinical trial participant at discharge, COPD (chronic obstructive pulmonary disease) ([address] Depression, GERD (gastroesophageal reflux disease), Hearing loss, History of blood transfusion, Hyperlipidemia, Hypertension, Panic attacks, Prostate hypertrophy, Under care of team, and Wellness examination. Social History:   reports that he quit smoking about a year ago. His smoking use included cigars and cigarettes. He started smoking about 56 years ago. He smoked an average of .5 packs per day. He quit smokeless tobacco use about 6 years ago. His smokeless tobacco use included snuff and chew. He reports current alcohol use of about 7.0 standard drinks per week. He reports current drug use. Drug: Marijuana Marcelino Goldberg). Family History:   Family History   Problem Relation Age of Onset    Other Mother         blind    Heart Surgery Mother     Cancer Father         leukemia       Vitals:  /74   Pulse 82   Temp 97.9 °F (36.6 °C) (Oral)   Resp 20   Ht 5' 9\" (1.753 m)   Wt 200 lb 9.9 oz (91 kg)   SpO2 93%   BMI 29.63 kg/m²   Temp (24hrs), Av.9 °F (36.6 °C), Min:97.8 °F (36.6 °C), Max:98 °F (36.7 °C)    No results for input(s): POCGLU in the last 72 hours. I/O (24Hr):     Intake/Output Summary (Last 24 hours) at 2022 1408  Last data filed at 2022 1829  Gross per 24 hour   Intake 450 ml   Output --   Net 450 ml       Labs:  Hematology:  Recent Labs     22  1818 222 22  0219 22  0827   WBC 17.7*  --   --   --    RBC 5.56  --   --   --    HGB 13.3  --   --   --    HCT 42.5  --   --   -- subcutaneous edema. No drainable fluid collection or soft tissue gas. Physical Examination:        General appearance:  alert, cooperative and no distress  Mental Status:  oriented to person, place and time and normal affect  Lungs:  clear to auscultation bilaterally, normal effort  Heart:  regular rate and rhythm, no murmur  Abdomen:  soft, nontender, nondistended, normal bowel sounds, no masses, hepatomegaly, splenomegaly  Extremities:  no edema, redness, tenderness in the calves. There is  pain with palpation of left leg, he is restricted with active and passive movement. Muscle strength 4 out of 5 bilaterally  Skin:  no gross lesions, rashes, induration there is some erythema noted on left leg    Assessment:        Hospital Problems             Last Modified POA    * (Principal) Pseudogout of ankle 11/14/2022 Yes    S/P cardiac cath- Multivessel CAD 3/24/16-Dr. winters 11/12/2022 Yes    Sepsis ([facility] [address] 11/12/2022 Yes    Acute left ankle pain 11/12/2022 Yes    Left leg weakness 11/12/2022 Yes    Ischemic cardiomyopathy 11/12/2022 Yes    Class 1 obesity due to excess calories with serious comorbidity and body mass index (BMI) of 31.0 to 31.9 in adult 11/12/2022 Yes    COPD (chronic obstructive pulmonary disease) ([address] 11/12/2022 Yes    Monoarticular arthritis 11/12/2022 Yes    Fall 11/12/2022 Yes    Hyperlipidemia 11/12/2022 Yes    S/P CABG x 4 11/12/2022 Yes    Primary hypertension 11/12/2022 Yes    Microcytosis 11/12/2022 Yes    Acute hypokalemia 11/12/2022 Yes      Plan:        Ankle pain due to Pseudogout:  Body fluid positive for Calcium pyrophosphate crystals. Start Colchicine. Outpatient orthopedic surgery evaluation for possible steroid injection. Stop abx  Bilateral leg weakness:  CT lumbar spine showed severe multilevel degenerative changes in spine with severe spinal canal and foraminal narrowing. Will consult neurosurgery. Microcytosis without anemia: Tsat: 7%. continue iron supplementation. BPH: No retention. History of ischemic cardiomyopathy with EF of 20 to 25% on echo from 2016: Currently appears compensated. He has AICD in place. Continue  home lasix, lisinopril and BB. History of coronary artery disease s/p CABG x4: Hold plavix incase pt needs surgical intervention continue lipitor. COPD: Stable. No exacerbation  Primary HTN: stable.    Thyroid follow up outpt    Dispo: Plan for d/c pending final neurosurgery evaluation  Azael Thomas DO  11/14/2022  2:08 PM

## 2022-11-14 NOTE — PROGRESS NOTES
[facility] - Progress Note  Today's Date and Time: 11/14/2022, 10:36 AM    Impression :   Left ankle effusion and pain after rolling his ankle during a fall   Low likelihood of septic arthritis  Positive crystal arthropathy  CAD s/p CABG  COPD  HFrEF, EF (2016): 20-25 %, s/p AICD placement 2016  HTN  Chronic back pain  H/o bladder masses (benign)    Recommendations:   Discontinue Vancomycin 11/14/22  D/C  Zosyn  Further recommendations pending culture data  No growth  Bursa fluid positive for calcium pyrophosphate crystals  Ankle pain appears to be related to pseudogout  CT lumbar spine for lower extremity weakness-Showing severe lumbar stenosis  Neurosurgery consulted    Medical Decision Making/Summary/Discussion:11/14/2022       Infection Control Recommendations   [place] Precautions    Antimicrobial Stewardship Recommendations     Simplification of therapy  Targeted therapy  PK dosing    Coordination of Outpatient Care:   Estimated Length of IV antimicrobials:TBD  Patient will need Midline Catheter Insertion: TBD  Patient will need PICC line Insertion:BD  Patient will need: Home IV , [place],  SNF,  LTAC: TBD  Patient will need outpatient wound care:No    Chief complaint/reason for consultation:   Concern for septic arthritis      History of Present Illness:   Flakita Alvarado is a 70y.o.-year-old male who was initially admitted on 11/11/2022. Patient seen at the request of Dr. Juan Manuel Lozano:    Patient presents to the hospital with left ankle pain, and bilateral leg weakness. Patient reports that his symptoms started 2 to 3 days ago when he fell down and rolled his ankle, he went to [facility] where the x-ray did not reveal any fractures but there was concern for possible infection thus he was managed with doxycycline twice daily.   He was discharged and he came back to [facility] ER 2 days later with the unresolved left ankle pain, bilateral leg weakness. Past medical history is significant for CAD s/p CABG, COPD, benign bladder tumors, BPH and chronic back pain. He also had a bladder cystoscopy 1 week ago for removal of bladder tumor. He has had 2 bladder masses removed previously which were negative for any malignancy    He denies any fever, nausea, vomiting, chest pain, shortness of breath or any abdominal pain, and dysuria. On evaluation patient is alert and oriented, in no apparent distress. He is afebrile, hemodynamically stable, saturating well on room air. He is complaining of neck pain and back pain but reports that the ankle pain is much improved. XR ANKLE LEFT (MIN 3 VIEWS)   Final Result   No acute osseous or soft tissue abnormality. XR FOOT LEFT (MIN 3 VIEWS)   Final Result   Soft tissue swelling without acute osseous abnormality. XR CHEST PORTABLE   Final Result   No acute cardiopulmonary findings. CT FOOT LEFT WO CONTRAST   Final Result   1. No acute osseous abnormality. Normal midfoot alignment. 2. Nonspecific subcutaneous edema. No drainable fluid collection or soft   tissue gas. CURRENT EVALUATION 11/14/2022  /74   Pulse 82   Temp 97.9 °F (36.6 °C) (Oral)   Resp 18   Ht 5' 9\" (1.753 m)   Wt 200 lb 9.9 oz (91 kg)   SpO2 93%   BMI 29.63 kg/m²     Afebrile  VS stable    The patient is alert and oriented on room air. He is complaining of severe back pain to the extent that he cannot bend his legs. He has full sensation in his lower extremities and does not appear to be retaining urine. He underwent a CT of the lumbar spine that revealed severe L4-L5 lumbar stenosis. No sign of osteomyelitis. Neurosurgery has been consulted.      S/p left ankle aspiration 11-12-22    WBC <11,937, many neutrophils and no bacteria seen on direct exam  Moderate calcium pyrophosphate crystals noted    Vancomycin discontinued as this appears to be an acute pseudogout attack    No other acute issues noted  Discussed with RN and Dr. Bar Oconnor, X rays reviewed: 11/14/2022    BUN: 16  Cr: 0.89    WBC: 17.7  Hb: 13.3  Plat:  467    CRP: 391.1-->372.7      Cultures:  Urine:    Blood:  11-11-22: No growth  11-12-22: No growth  Sputum :    Left ankle bursa aspirate:  11/12/22: No bacteria seen  MRSA Nares:  11/12/22: Not detected    Imaging:     CXR 11-11-22      Left ankle 11-11-22      Discussed with patient, RN, family. I have personally reviewed the past medical history, past surgical history, medications, social history, and family history, and I have updated the database accordingly. Past Medical History:     Past Medical History:   Diagnosis Date    Abnormal EKG     anterior fascicular block    Bladder tumor     CAD (coronary artery disease)     Clinical trial participant at discharge 03/24/2016    disqualified 3/24/2016    COPD (chronic obstructive pulmonary disease) ([facility] [place] [address]     Depression     GERD (gastroesophageal reflux disease)     Hearing loss     History of blood transfusion     Hyperlipidemia     Hypertension 2012    Panic attacks     Prostate hypertrophy     Under care of team     Dr. Rafat Tenorio, cardiology    Wellness examination     -PCP seen in Dec. 2019       Past Surgical  History:     Past Surgical History:   Procedure Laterality Date    BLADDER TUMOR EXCISION  01/15/2016    TURB with gyrus    CARDIAC DEFIBRILLATOR PLACEMENT  09/22/2016    Unsafe Pacemaker. [facility] [place] [address]881 ICD Model # A1037609.  RV Lead [place] SG Model # [id number]    CORONARY ARTERY BYPASS GRAFT  03/27/2016    X 4 Emergent    CORONARY ARTERY BYPASS GRAFT  04/01/2016    CYSTOSCOPY      CYSTOSCOPY  01/15/2016    CYSTOSCOPY  10/26/2022    CYSTOSCOPY BLADDER BIOPSY, FULGARATION    CYSTOSCOPY N/A 10/26/2022    CYSTOSCOPY BLADDER BIOPSY, FULGARATION performed by Cookie Bermudez MD at [place] [id number] Right     LIPOMA RESECTION  child    anterior neck    TONSILLECTOMY AND ADENOIDECTOMY  child       Medications:      ferrous sulfate  325 mg Oral Every Other Day    morphine  4 mg IntraVENous Once    polyethylene glycol  17 g Oral Daily    atorvastatin  40 mg Oral Nightly    buPROPion  450 mg Oral QAM    [Held by provider] clopidogrel  75 mg Oral Daily    fenofibrate  54 mg Oral Daily    budesonide-formoterol  2 puff Inhalation BID    furosemide  40 mg Oral Daily    lisinopril  2.5 mg Oral Daily    metoprolol tartrate  25 mg Oral BID    traZODone  150 mg Oral Nightly    vilazodone HCl  20 mg Oral Daily    sodium chloride flush  5-40 mL IntraVENous 2 times per day    enoxaparin  40 mg SubCUTAneous Daily    vancomycin (VANCOCIN) intermittent dosing (placeholder)   Other RX Placeholder    gabapentin  100 mg Oral TID    sennosides-docusate sodium  2 tablet Oral Daily    ARIPiprazole  10 mg Oral Daily    vancomycin  1,000 mg IntraVENous Q12H       Social History:     Social History     Socioeconomic History    Marital status:      Spouse name: Not on file    Number of children: 1    Years of education: Not on file    Highest education level: Not on file   Occupational History    Occupation: [employer] man   Tobacco Use    Smoking status: Former     Packs/day: 0.50     Types: Cigars, Cigarettes     Start date: 1966     Quit date: 2021     Years since quittin.0    Smokeless tobacco: Former     Types: Snuff, Chew     Quit date: 3/23/2016   Vaping Use    Vaping Use: Never used   Substance and Sexual Activity    Alcohol use:  Yes     Alcohol/week: 7.0 standard drinks     Types: 7 Cans of beer per week     Comment: occasionally    Drug use: Yes     Types: Marijuana [place])    Sexual activity: Not on file   Other Topics Concern    Not on file   Social History Narrative    Not on file     Social Determinants of Health     Financial Resource Strain: Low Risk     Difficulty of Paying Living Expenses: Not hard at all   Food Insecurity: No Food Insecurity    Worried About Running Out of Food in the Last Year: Never true    [address] in the Last Year: Never true   Transportation Needs: No Transportation Needs    Lack of Transportation (Medical): No    Lack of Transportation (Non-Medical): No   Physical Activity: Insufficiently Active    Days of Exercise per Week: 3 days    Minutes of Exercise per Session: 30 min   Stress: Stress Concern Present    Feeling of Stress : Very much   Social Connections: Socially Isolated    Frequency of Communication with Friends and Family: Never    Frequency of Social Gatherings with Friends and Family: More than three times a week    Attends [employer] Services: Never    Active Member of Clubs or Organizations: No    Attends Club or Organization Meetings: Never    Marital Status:    Intimate Partner Violence: Not At Risk    Fear of Current or Ex-Partner: No    Emotionally Abused: No    Physically Abused: No    Sexually Abused: No   Housing Stability: Unknown    Unable to Pay for Housing in the Last Year: No    Number of Places Lived in the Last Year: Not on file    Unstable Housing in the Last Year: No       Family History:     Family History   Problem Relation Age of Onset    Other Mother         blind    Heart Surgery Mother     Cancer Father         leukemia        Allergies:   Patient has no known allergies. Review of Systems:   Constitutional: No fevers or chills. No systemic complaints  Head: No headaches  Eyes: No double vision or blurry vision. No conjunctival inflammation. ENT: No sore throat or runny nose. . No hearing loss, tinnitus or vertigo. Cardiovascular: No chest pain or palpitations. No shortness of breath. No TALBOT  Lung: No shortness of breath or cough. No sputum production  Abdomen: No nausea, vomiting, diarrhea, or abdominal pain. Shobha Selma No cramps. Genitourinary: No increased urinary frequency, or dysuria. No hematuria.  No suprapubic or CVA pain  Musculoskeletal: left ankle pain, bilateral lower extremity pain and weakness  Neurologic: No headache, weakness, numbness, or tingling. Integument: No rash, no ulcers. Psychiatric: No depression. Endocrine: No polyuria, no polydipsia, no polyphagia. Physical Examination :   Patient Vitals for the past 8 hrs:   BP Temp Temp src Pulse Resp SpO2 Weight   11/14/22 0846 -- -- -- -- 18 -- --   11/14/22 0806 -- -- -- 82 20 93 % --   11/14/22 0600 -- -- -- 97 -- 92 % 200 lb 9.9 oz (91 kg)   11/14/22 0500 -- -- -- 90 -- 91 % --   11/14/22 0400 118/74 97.9 °F (36.6 °C) Oral 89 -- 90 % --   11/14/22 0300 -- -- -- 92 -- 91 % --     General Appearance: Awake, alert, and in no apparent distress  Head:  Normocephalic, no trauma  Eyes: Pupils equal, round, reactive to light and accommodation; extraocular movements intact; sclera anicteric; conjunctivae pink. No embolic phenomena. ENT: Oropharynx clear, without erythema, exudate, or thrush. No tenderness of sinuses. Mouth/throat: mucosa pink and moist. No lesions. Dentition in good repair. Neck:Supple, without lymphadenopathy. Thyroid normal, No bruits. Pulmonary/Chest: Clear to auscultation, without wheezes, rales, or rhonchi. No dullness to percussion. Cardiovascular: Regular rate and rhythm without murmurs, rubs, or gallops. Abdomen: Soft, non tender. Bowel sounds normal. No organomegaly  All four Extremities: no swelling, tenderness on palapation of left ankle, tenderness on palpation of lower extremity, restricted ROM left ankle, no warmth or redness. Neurologic: No gross sensory or motor deficits. Skin: Warm and dry with good turgor. No signs of peripheral arterial or venous insufficiency. No ulcerations. No open wounds.     Medical Decision Making -Laboratory:   I have independently reviewed/ordered the following labs:    CBC with Differential:   Recent Labs     11/11/22  1818   WBC 17.7*   HGB 13.3   HCT 42.5   *   LYMPHOPCT 3*   MONOPCT 9*     BMP:   Recent Labs     11/11/22  1818 11/12/22  0219 11/14/22  0527    138  --    K 4.0 3. 6* 4.0    105  --    CO2 26 22  --    BUN 15 16  --    CREATININE 1.03 0.89  --    MG  --   --  1.9     Hepatic Function Panel:   Recent Labs     11/11/22  1818 11/12/22  0219   PROT 7.7 6.3*   LABALBU 3.7 2.6*   BILITOT 0.8 0.6   ALKPHOS 89 70   ALT 8 <5*   AST 14 11     No results for input(s): RPR in the last 72 hours. No results for input(s): HIV in the last 72 hours. No results for input(s): BC in the last 72 hours. Lab Results   Component Value Date/Time    MUCUS 1+ 03/26/2016 02:30 PM    RBC 5.56 11/11/2022 06:18 PM    RBC 4.85 02/13/2012 11:22 AM    TRICHOMONAS NOT REPORTED 03/26/2016 02:30 PM    WBC 17.7 11/11/2022 06:18 PM    YEAST NOT REPORTED 03/26/2016 02:30 PM    TURBIDITY Clear 11/11/2022 09:15 PM     Lab Results   Component Value Date/Time    CREATININE 0.89 11/12/2022 02:19 AM    GLUCOSE 102 11/12/2022 02:19 AM    GLUCOSE 105 02/13/2012 11:22 AM       Medical Decision Making-Imaging:     Narrative   EXAMINATION:   THREE XRAY VIEWS OF THE LEFT FOOT       11/12/2022 6:51 am       COMPARISON:   None. HISTORY:   ORDERING SYSTEM PROVIDED HISTORY: infection   TECHNOLOGIST PROVIDED HISTORY:   infection       FINDINGS:   There is no acute osseous abnormality. The joint spaces are maintained. There is soft tissue swelling. Impression   Soft tissue swelling without acute osseous abnormality. Narrative   EXAMINATION:   THREE XRAY VIEWS OF THE LEFT ANKLE       11/12/2022 6:51 am       COMPARISON:   None. HISTORY:   ORDERING SYSTEM PROVIDED HISTORY: infection   TECHNOLOGIST PROVIDED HISTORY:   infection       FINDINGS:   There is no acute osseous abnormality. The joint spaces are maintained. The   surrounding soft tissues are unremarkable. Impression   No acute osseous or soft tissue abnormality.      Narrative   EXAMINATION:   ONE XRAY VIEW OF THE CHEST       11/11/2022 7:51 pm       COMPARISON:   05/17/2016       HISTORY:   ORDERING SYSTEM PROVIDED HISTORY: Sepsis   TECHNOLOGIST PROVIDED HISTORY:   Sepsis   Reason for Exam: upr,leg pain,sepsis       Initial encounter       FINDINGS:   Status post median sternotomy. Cardiac AICD is noted. No focal   consolidation, pleural effusion or pneumothorax. The cardiomediastinal   silhouette is stable. No overt pulmonary edema. The osseous structures are   stable. Impression   No acute cardiopulmonary findings. Narrative   EXAMINATION:   CT OF THE LEFT FOOT WITHOUT CONTRAST 11/11/2022 6:36 pm       TECHNIQUE:   CT of the left foot was performed without the administration of intravenous   contrast.  Multiplanar reformatted images are provided for review. Automated   exposure control, iterative reconstruction, and/or weight based adjustment of   the mA/kV was utilized to reduce the radiation dose to as low as reasonably   achievable. COMPARISON:   None. HISTORY   ORDERING SYSTEM PROVIDED HISTORY: possible tarsal avulsion seen on prior xray   TECHNOLOGIST PROVIDED HISTORY:   possible tarsal avulsion seen on prior xray   Decision Support Exception - unselect if not a suspected or confirmed   emergency medical condition->Emergency Medical Condition (MA)       FINDINGS:   The distal tibia and fibula are intact. No syndesmotic widening. The ankle   mortise is intact. Mild chondrocalcinosis of the tibiotalar joint. The   tibial plafond and talar dome are normal in appearance. The subtalar joint   is unremarkable. The talonavicular and calcaneocuboid joints are   unremarkable. The naviculocuneiform articulations are unremarkable. Mild   2nd tarsometatarsal degenerative changes. Normal midfoot alignment is   maintained. No Lisfranc interval widening. Mild chondrocalcinosis of the   midfoot. The metatarsophalangeal and interphalangeal joints are intact. No   fracture or dislocation identified. No osseous erosion or periosteal   reaction.        Mild atherosclerotic vascular calcifications. The visualized tendons are   grossly intact. Dorsal mid and forefoot subcutaneous edema. Circumferential   subcutaneous edema about the ankle. No soft tissue gas or drainable fluid   collection. Impression   1. No acute osseous abnormality. Normal midfoot alignment. 2. Nonspecific subcutaneous edema. No drainable fluid collection or soft   tissue gas. Narrative   EXAMINATION:   CT OF THE LUMBAR SPINE WITH CONTRAST  11/14/2022 11:58 am       TECHNIQUE:   CT of the lumbar spine was performed with the administration of intravenous   contrast. Multiplanar reformatted images are provided for review. Automated   exposure control, iterative reconstruction, and/or weight based adjustment of   the mA/kV was utilized to reduce the radiation dose to as low as reasonably   achievable. COMPARISON:   Lumbar spine radiographs 11/08/2008       HISTORY:   ORDERING SYSTEM PROVIDED HISTORY: concern for slipped disc as well as   possible mass/psoas abbcess or lytic/blasic lesions suggesting underlying   maligancy contributing to possible pathological fracture. TECHNOLOGIST PROVIDED HISTORY:   concern for slipped disc as well as possible mass/psoas abbcess or   lytic/blasic lesions suggesting underlying maligancy contributing to possible   pathological fracture. FINDINGS:   BONES/ALIGNMENT:  There is a levoconvex lumbar scoliosis. There is   multilevel disc space narrowing and degenerative endplate sclerosis   throughout the lower thoracic and lumbar spine. There is no spondylolysis. No lytic or blastic osseous lesions are identified. SOFT TISSUES: There is no paraspinal mass identified. There is no abnormal   fluid collection identified. Severe atherosclerotic calcifications are   present within the abdominal aorta. L1-L2: There is a disc bulge and osteophyte formation. There is bilateral   facet arthropathy. There is severe left neural foraminal narrowing.   No significant spinal canal stenosis or right neural foraminal narrowing is   present. L2-L3: There is a disc bulge, facet arthropathy and thickening of the   ligamentum flavum. There is severe left neural foraminal narrowing. There   is no significant right neural foraminal narrowing. Mild spinal canal   stenosis is present. L3-L4: There is a disc bulge, facet arthropathy and thickening of the   ligamentum flavum. , severe right and moderate left neural foraminal   narrowing. There is moderate spinal canal stenosis. L4-L5: There is a disc bulge, facet arthropathy and thickening of the   ligamentum flavum. There is severe spinal canal stenosis, severe right and   mild left neural foraminal narrowing. L5-S1: There is a disc bulge, posterior osteophyte formation and facet   arthropathy. There is no significant spinal canal stenosis. Severe   bilateral neural foraminal narrowing is present. Impression   1. No findings to suggest acute discitis/osteomyelitis. 2. No abscess identified. 3. Severe multilevel degenerative changes of the lumbar spine, as described   above, but most pronounced at L4-5 where there is severe spinal canal   stenosis, severe right and mild left neural foraminal narrowing. Medical Decision [id number]:       Medical Decision Making-Other:     Note:  Labs, medications, radiologic studies were reviewed with personal review of films  Large amounts of data were reviewed  Discussed with nursing Staff, Discharge planner  Infection Control and Prevention measures reviewed  All prior entries were reviewed  Administer medications as ordered  Prognosis: Good  Discharge planning reviewed  Follow up as outpatient. Thank you for allowing us to participate in the care of this patient. Please call with questions. Brittanie Stafford, APRN - CNP    ATTESTATION:    I have discussed the case, including pertinent history and exam findings with the APRN. I have evaluated the  History, physical findings and pictures of the patient and the key elements of the encounter have been performed by me. I have reviewed the laboratory data, other diagnostic studies and discussed them with the APRN. I have updated the medical record where necessary. I agree with the assessment, plan and orders as documented by the APRN.     Nano Corrales MD.        Pager: [phone] - Office: [phone]

## 2022-11-14 NOTE — PROGRESS NOTES
Physical Therapy  Facility/Department: [facility] 2- STEPDOWN  Physical Therapy Initial Assessment    Name: Derek Bain  : 1950  MRN: [id number]  Date of Service: 2022  History copied and pasted from H+P:  Derek Bain is a 70 y.o. caucation male with underlying DJD, benign bladder tumors, COPD, BPH and CABG who presented with intractable left ankle pain, left leg weakness with reduced ROM from hip down. Patient reports he was putting his grandson to sleep in the evening and turned left to walk out the room when he felt immediate sharp lower back pain which caused him to loose balance and roll his ankle. He felt instant pain 10/10. He sat down and tried to wait until it subsided but by the morning he couldn't take the pain anymore. He went to [facility] who worked him up. XR didn't reveal any fractures or pathology but there was concern for possible infection thus was managed with doxycycine BID. He was sent home only to return 2 days later in [facility] ER for unresolved left ankle pain and now associated with intermittent right leg pain which is more of a shooting pain radiating from back to ankle without Right leg swelling. He attributed this to his underlying chronic back pain that has been worse over the past 6 months. During these 6 months he also reported 30 lb weight loss but says he did this by not having deserts or candy. He denies fever, night sweats but endorses intermittent chills w/myalgias that has been going on for 2 months. He says this is not in the setting of his anle pain/injury. 1 week ago he had bladder cystoscopy for removal of bladder tumor. During that time he was told to hold some of his meds including plavix. Since then he hasnt taken any of his home meds due to not being told by his doctors to go back on his meds. Discharge Recommendations:  Further therapy recommended at discharge.         PT Equipment Recommendations  Equipment Needed: No      Patient Diagnosis(es): The encounter diagnosis was Sepsis without acute organ dysfunction, due to unspecified organism [facility]). Past Medical History:  has a past medical history of Abnormal EKG, Bladder tumor, CAD (coronary artery disease), Clinical trial participant at discharge, COPD (chronic obstructive pulmonary disease) ([address] Depression, GERD (gastroesophageal reflux disease), Hearing loss, History of blood transfusion, Hyperlipidemia, Hypertension, Panic attacks, Prostate hypertrophy, Under care of team, and Wellness examination. Past Surgical History:  has a past surgical history that includes Tonsillectomy and adenoidectomy (child); Cystoscopy; lipoma resection (child); Cystoscopy (01/15/2016); Coronary artery bypass graft (03/27/2016); Cardiac defibrillator placement (09/22/2016); hernia repair (Right); bladder tumor excision (01/15/2016); Cystocopy (10/26/2022); Cystoscopy (N/A, 10/26/2022); and Coronary artery bypass graft (04/01/2016). Assessment   Pt alert, cooperative, extremely poor tolerance to mobility/activity this date. Per pt, he was able to dangle himself EOB yesterday evening (he was unable to mobilize during OT eval d/t severe back and LE pain) without staff assistance, but today wouldn't even allow LE ROM and wouldn't attempt to sit at EOB d/t 10/10 pain. RN notified. He was able to move his R ankle and allowed PT to gently stretch it; allowed PT to initiate R hip flexion, but unable to tolerate;  he would not allow PT to touch his LLE at all, wiggled his L toes and ankle minimally and then stated that's all he could do b/c of pain. Body Structures, Functions, Activity Limitations Requiring Skilled Therapeutic Intervention: Decreased functional mobility ; Decreased ROM; Decreased strength; Increased pain  Therapy Prognosis: Good  Decision Making: High Complexity  Requires PT Follow-Up: Yes  Activity Tolerance  Activity Tolerance: Patient limited by pain     Plan   Physcial Therapy Plan  General Plan:  (5-6 visits weekly)  Current Treatment Recommendations: Strengthening, ROM, Balance training, Functional mobility training  Safety Devices  Type of Devices: Call light within reach, Patient at risk for falls, Left in bed, Nurse notified  Restraints  Restraints Initially in Place: No     Restrictions  Restrictions/Precautions  Restrictions/Precautions: Fall Risk, General Precautions, Up as Tolerated, Weight Bearing  Required Braces or Orthoses?: Yes  Implants present? : Pacemaker  Required Braces or Orthoses  Left Lower Extremity Brace: Boot  LLE Brace Type: CAM boot LLE for \"ankle sprain/pain\" WBAT LLE  Position Activity Restriction  Other position/activity restrictions: up with A     Subjective   General  Patient assessed for rehabilitation services?: Yes  Response To Previous Treatment: Not applicable  Family / Caregiver Present: No  Follows Commands: Within Functional Limits  Subjective  Subjective: 8.5/10 at rest; 10/10 with attempted activity         Social/Functional History  Social/Functional History  Lives With: Friend(s)  Type of Home: House  Home Layout: Two level, 1/2 bath on main level, Bed/Bath upstairs, Able to Live on Main level with bedroom/bathroom  Home Access: Stairs to enter without rails  Entrance Stairs - Number of Steps: 3  Bathroom Shower/Tub: Tub/Shower unit  Bathroom Toilet: Handicap height  Bathroom Equipment: Grab bars in [id number]: Cane, Rollator  ADL Assistance: Independent  Homemaking Assistance: Independent  Homemaking Responsibilities: Yes  Ambulation Assistance: Independent (normally uses st cane per pt, uses rwalker on \"bad days\" with LE and back pain.   Pt states he's had this pain in his back and legs before)  Transfer Assistance: Independent  Active : No  Patient's  Info: pt states he hasn't driven in ~4 months d/t dizziness  Mode of Transportation: Friends  Occupation: Retired  Leisure & Hobbies: tv  Additional Comments: Friend/roomate suffers from alcoholism and likely not able to assist pt at d/c  Vision/Hearing  Vision  Vision: Impaired  Vision Exceptions: Wears glasses for reading  Hearing  Hearing: Exceptions to [facility]  Hearing Exceptions: Hard of hearing/hearing concerns    Cognition   Orientation  Overall Orientation Status: Within Functional Limits  Orientation Level: Oriented to place;Oriented to time;Oriented to situation;Oriented to person  Cognition  Overall Cognitive Status: [facility]     Objective   Heart Rate: 82  Heart Rate Source: Monitor  BP: 118/74  BP Location: Left upper arm  BP Method: Automatic  Patient Position: Supine  MAP (Calculated): 89  Resp: 18  SpO2: 93 %  O2 Device: None (Room air)     Observation/Palpation  Posture: Good (KIM--pt unable to tolerate any mobility this date)        PROM RLE (degrees)  RLE PROM: Exceptions--hip flexion ~20 degrees--limited by pain; knee flexion ~20 degrees, limited by pain, full knee extension in supine; ankle dorsiflexion to neutral; plantarflexion ~20 degrees  PROM LLE (degrees)  LLE PROM: Exceptions--pt positioned with L hip ER and flexed, unable to tolerate movement ; knee flexed ~30 degrees, unable to assess extension d/t pain; ankle dorsiflexion lacks ~15 degrees from neutral, plantarflexion 20 degrees  AROM RUE (degrees)  RUE AROM : WFL  AROM LUE (degrees)  LUE AROM : WFL  Strength RLE  Strength RLE: Exception--unable to assess d/t severe pain with attempted movement; pt able to move ankle 3/5; unable to assess knee/hip (able to initiate movement)   Strength LLE  Strength LLE: Exception--pt wouldn't attempt to move his L hip/knee d/t severe back and leg pain; wiggled ankle ~2/5 through limited ROM  Strength RUE  Strength RUE: WFL  Strength LUE  Strength LUE: WFL           Bed mobility  Rolling to Left: Unable to assess  Rolling to Right: Unable to assess  Supine to Sit: Unable to assess  Sit to Supine: Unable to assess  Scooting: Unable to assess  Bed Mobility Comments: pt declining to mobilize this date d/t severe back and LE pain at rest and with attempted mobility  Transfers  Sit to Stand: Unable to assess  Stand to Sit: Unable to assess  Bed to Chair: Unable to assess  Stand Pivot Transfers: Unable to assess  Comment: unable to mobilize pt d/t severe back and LE pain at rest and with attempted mobility  Ambulation  More Ambulation?: No  Stairs/Curb  Stairs?: No        PROM Exercises: AROM BUEs; AAROM R ankle; unable to tolerate knee/hip; unable to tolerate any assisted or active movement LLE   AM-PAC Score  AM-PAC Inpatient Mobility Raw Score : 6 (11/14/22 1111)  AM-PAC Inpatient T-Scale Score : 23.55 (11/14/22 1111)  Mobility Inpatient CMS 0-100% Score: 100 (11/14/22 1111)  Mobility Inpatient CMS G-Code Modifier : CN (11/14/22 1111)          Goals  Short Term Goals  Time Frame for Short Term Goals: 1 visit  Short Term Goal 1: assess mobility as pt's pain status allows and set goals  Patient Goals   Patient Goals : decrease pain, be able to mobilize       Education  Patient Education  Education Given To: Patient  Education Provided: Role of Therapy;Plan of Care  Education Method: Verbal  Education Outcome: Continued education needed      Therapy Time   Individual Concurrent Group Co-treatment   Time In 1025         Time Out 1102         Minutes 37         Timed Code Treatment Minutes: [address] [facility]       Kary Westbrook PT

## 2022-11-15 ENCOUNTER — APPOINTMENT (OUTPATIENT)
Dept: CT IMAGING | Age: 72
DRG: 553 | End: 2022-11-15
Payer: MEDICARE

## 2022-11-15 ENCOUNTER — APPOINTMENT (OUTPATIENT)
Dept: INTERVENTIONAL RADIOLOGY/VASCULAR | Age: 72
DRG: 553 | End: 2022-11-15
Payer: MEDICARE

## 2022-11-15 LAB
C-REACTIVE PROTEIN: 402.1 MG/L (ref 0–5)
LV EF: 55 %
LVEF MODALITY: NORMAL

## 2022-11-15 PROCEDURE — 99221 1ST HOSP IP/OBS SF/LOW 40: CPT | Performed by: NEUROLOGICAL SURGERY

## 2022-11-15 PROCEDURE — 6360000002 HC RX W HCPCS: Performed by: NURSE PRACTITIONER

## 2022-11-15 PROCEDURE — 2060000000 HC ICU INTERMEDIATE R&B

## 2022-11-15 PROCEDURE — 97535 SELF CARE MNGMENT TRAINING: CPT

## 2022-11-15 PROCEDURE — APPSS30 APP SPLIT SHARED TIME 16-30 MINUTES: Performed by: NURSE PRACTITIONER

## 2022-11-15 PROCEDURE — 2700000000 HC OXYGEN THERAPY PER DAY

## 2022-11-15 PROCEDURE — 62304 MYELOGRAPHY LUMBAR INJECTION: CPT

## 2022-11-15 PROCEDURE — 99232 SBSQ HOSP IP/OBS MODERATE 35: CPT | Performed by: INTERNAL MEDICINE

## 2022-11-15 PROCEDURE — 97163 PT EVAL HIGH COMPLEX 45 MIN: CPT

## 2022-11-15 PROCEDURE — 2709999900 HC NON-CHARGEABLE SUPPLY

## 2022-11-15 PROCEDURE — 6370000000 HC RX 637 (ALT 250 FOR IP): Performed by: INTERNAL MEDICINE

## 2022-11-15 PROCEDURE — 97530 THERAPEUTIC ACTIVITIES: CPT

## 2022-11-15 PROCEDURE — 6360000004 HC RX CONTRAST MEDICATION: Performed by: STUDENT IN AN ORGANIZED HEALTH CARE EDUCATION/TRAINING PROGRAM

## 2022-11-15 PROCEDURE — 2580000003 HC RX 258: Performed by: NURSE PRACTITIONER

## 2022-11-15 PROCEDURE — 36415 COLL VENOUS BLD VENIPUNCTURE: CPT

## 2022-11-15 PROCEDURE — 6370000000 HC RX 637 (ALT 250 FOR IP): Performed by: NURSE PRACTITIONER

## 2022-11-15 PROCEDURE — 94761 N-INVAS EAR/PLS OXIMETRY MLT: CPT

## 2022-11-15 PROCEDURE — 93306 TTE W/DOPPLER COMPLETE: CPT

## 2022-11-15 PROCEDURE — B01B1ZZ FLUOROSCOPY OF SPINAL CORD USING LOW OSMOLAR CONTRAST: ICD-10-PCS | Performed by: RADIOLOGY

## 2022-11-15 PROCEDURE — 86140 C-REACTIVE PROTEIN: CPT

## 2022-11-15 PROCEDURE — 72132 CT LUMBAR SPINE W/DYE: CPT

## 2022-11-15 PROCEDURE — 99232 SBSQ HOSP IP/OBS MODERATE 35: CPT | Performed by: STUDENT IN AN ORGANIZED HEALTH CARE EDUCATION/TRAINING PROGRAM

## 2022-11-15 PROCEDURE — 94640 AIRWAY INHALATION TREATMENT: CPT

## 2022-11-15 RX ADMIN — LISINOPRIL 2.5 MG: 2.5 TABLET ORAL at 08:08

## 2022-11-15 RX ADMIN — OXYCODONE 5 MG: 5 TABLET ORAL at 09:17

## 2022-11-15 RX ADMIN — ENOXAPARIN SODIUM 40 MG: 100 INJECTION SUBCUTANEOUS at 08:09

## 2022-11-15 RX ADMIN — HYDROXYZINE HYDROCHLORIDE 25 MG: 25 TABLET ORAL at 23:46

## 2022-11-15 RX ADMIN — COLCHICINE 0.6 MG: 0.6 TABLET, FILM COATED ORAL at 21:04

## 2022-11-15 RX ADMIN — OXYCODONE 5 MG: 5 TABLET ORAL at 14:00

## 2022-11-15 RX ADMIN — SODIUM CHLORIDE, PRESERVATIVE FREE 10 ML: 5 INJECTION INTRAVENOUS at 21:05

## 2022-11-15 RX ADMIN — HYDROXYZINE HYDROCHLORIDE 25 MG: 25 TABLET ORAL at 05:31

## 2022-11-15 RX ADMIN — BUDESONIDE AND FORMOTEROL FUMARATE DIHYDRATE 2 PUFF: 160; 4.5 AEROSOL RESPIRATORY (INHALATION) at 08:30

## 2022-11-15 RX ADMIN — COLCHICINE 0.6 MG: 0.6 TABLET, FILM COATED ORAL at 08:10

## 2022-11-15 RX ADMIN — DESMOPRESSIN ACETATE 40 MG: 0.2 TABLET ORAL at 21:04

## 2022-11-15 RX ADMIN — DOCUSATE SODIUM 50 MG AND SENNOSIDES 8.6 MG 2 TABLET: 8.6; 5 TABLET, FILM COATED ORAL at 08:08

## 2022-11-15 RX ADMIN — GABAPENTIN 100 MG: 100 CAPSULE ORAL at 08:09

## 2022-11-15 RX ADMIN — FUROSEMIDE 40 MG: 40 TABLET ORAL at 08:09

## 2022-11-15 RX ADMIN — GABAPENTIN 100 MG: 100 CAPSULE ORAL at 21:04

## 2022-11-15 RX ADMIN — IOPAMIDOL 8 ML: 408 INJECTION, SOLUTION INTRATHECAL at 13:34

## 2022-11-15 RX ADMIN — FENOFIBRATE 54 MG: 54 TABLET ORAL at 08:09

## 2022-11-15 RX ADMIN — VILAZODONE HYDROCHLORIDE 20 MG: 20 TABLET ORAL at 08:11

## 2022-11-15 RX ADMIN — METOPROLOL TARTRATE 25 MG: 25 TABLET ORAL at 21:04

## 2022-11-15 RX ADMIN — HYDROXYZINE HYDROCHLORIDE 25 MG: 25 TABLET ORAL at 11:43

## 2022-11-15 RX ADMIN — ARIPIPRAZOLE 10 MG: 10 TABLET ORAL at 08:09

## 2022-11-15 RX ADMIN — SODIUM CHLORIDE, PRESERVATIVE FREE 10 ML: 5 INJECTION INTRAVENOUS at 08:09

## 2022-11-15 RX ADMIN — BUPROPION HYDROCHLORIDE 450 MG: 150 TABLET, EXTENDED RELEASE ORAL at 08:09

## 2022-11-15 RX ADMIN — OXYCODONE 5 MG: 5 TABLET ORAL at 21:04

## 2022-11-15 RX ADMIN — OXYCODONE 5 MG: 5 TABLET ORAL at 04:49

## 2022-11-15 RX ADMIN — FERROUS SULFATE TAB EC 325 MG (65 MG FE EQUIVALENT) 325 MG: 325 (65 FE) TABLET DELAYED RESPONSE at 08:09

## 2022-11-15 RX ADMIN — METOPROLOL TARTRATE 25 MG: 25 TABLET ORAL at 08:08

## 2022-11-15 RX ADMIN — BUDESONIDE AND FORMOTEROL FUMARATE DIHYDRATE 2 PUFF: 160; 4.5 AEROSOL RESPIRATORY (INHALATION) at 20:26

## 2022-11-15 RX ADMIN — TRAZODONE HYDROCHLORIDE 150 MG: 50 TABLET ORAL at 21:04

## 2022-11-15 RX ADMIN — GABAPENTIN 100 MG: 100 CAPSULE ORAL at 14:00

## 2022-11-15 ASSESSMENT — PAIN SCALES - GENERAL
PAINLEVEL_OUTOF10: 6
PAINLEVEL_OUTOF10: 5
PAINLEVEL_OUTOF10: 4

## 2022-11-15 ASSESSMENT — ENCOUNTER SYMPTOMS: TACHYPNEA: 1

## 2022-11-15 ASSESSMENT — PAIN DESCRIPTION - LOCATION
LOCATION: LEG
LOCATION: LEG
LOCATION: ANKLE

## 2022-11-15 ASSESSMENT — PAIN DESCRIPTION - ORIENTATION: ORIENTATION: LEFT

## 2022-11-15 ASSESSMENT — PAIN DESCRIPTION - DESCRIPTORS
DESCRIPTORS: ACHING
DESCRIPTORS: ACHING

## 2022-11-15 NOTE — PROGRESS NOTES
[facility] - Progress Note  Today's Date and Time: 11/15/2022, 11:11 AM    Impression :   Left ankle effusion and pain after rolling his ankle during a fall   Low likelihood of septic arthritis  Positive crystal arthropathy  CAD s/p CABG  COPD  HFrEF, EF (2016): 20-25 %, s/p AICD placement 2016  HTN  Chronic back pain  H/o bladder masses (benign)    Recommendations:   Discontinue Vancomycin 11/14/22  D/C  Zosyn  Further recommendations pending culture data  No growth  Bursa fluid positive for calcium pyrophosphate crystals  Ankle pain appears to be related to pseudogout  CT lumbar spine for lower extremity weakness-Showing severe lumbar stenosis  Neurosurgery consulted  CT myelogram ordered    Medical Decision Making/Summary/Discussion:11/15/2022       Infection Control Recommendations   [place] Precautions    Antimicrobial Stewardship Recommendations     Discontinuation of therapy    Coordination of Outpatient Care:   Estimated Length of IV antimicrobials:TBD  Patient will need Midline Catheter Insertion: TBD  Patient will need PICC line Insertion:BD  Patient will need: Home IV , [place],  SNF,  LTAC: TBD  Patient will need outpatient wound care:No    Chief complaint/reason for consultation:   Concern for septic arthritis      History of Present Illness:   Noel Arroyo is a 70y.o.-year-old male who was initially admitted on 11/11/2022. Patient seen at the request of Dr. Danay Sommers:    Patient presents to the hospital with left ankle pain, and bilateral leg weakness. Patient reports that his symptoms started 2 to 3 days ago when he fell down and rolled his ankle, he went to [facility] where the x-ray did not reveal any fractures but there was concern for possible infection thus he was managed with doxycycline twice daily. He was discharged and he came back to [facility] ER 2 days later with the unresolved left ankle pain, bilateral leg weakness. Past medical history is significant for CAD s/p CABG, COPD, benign bladder tumors, BPH and chronic back pain. He also had a bladder cystoscopy 1 week ago for removal of bladder tumor. He has had 2 bladder masses removed previously which were negative for any malignancy    He denies any fever, nausea, vomiting, chest pain, shortness of breath or any abdominal pain, and dysuria. On evaluation patient is alert and oriented, in no apparent distress. He is afebrile, hemodynamically stable, saturating well on room air. He is complaining of neck pain and back pain but reports that the ankle pain is much improved. XR ANKLE LEFT (MIN 3 VIEWS)   Final Result   No acute osseous or soft tissue abnormality. XR FOOT LEFT (MIN 3 VIEWS)   Final Result   Soft tissue swelling without acute osseous abnormality. XR CHEST PORTABLE   Final Result   No acute cardiopulmonary findings. CT FOOT LEFT WO CONTRAST   Final Result   1. No acute osseous abnormality. Normal midfoot alignment. 2. Nonspecific subcutaneous edema. No drainable fluid collection or soft   tissue gas. CURRENT EVALUATION 11/15/2022  /61   Pulse 91   Temp 97.9 °F (36.6 °C) (Oral)   Resp 20   Ht 5' 9\" (1.753 m)   Wt 200 lb (90.7 kg)   SpO2 97%   BMI 29.53 kg/m²     Afebrile  VS stable    The patient is alert and oriented on 1 L NC. He is still complaining of severe back pain to the extent that he cannot bend his legs. He has full sensation in his lower extremities and does not appear to be retaining urine. He underwent a CT of the lumbar spine that revealed severe L4-L5 lumbar stenosis. No sign of osteomyelitis. Neurosurgery has been consulted.    CT myelogram ordered    S/p left ankle aspiration 11-12-22  WBC <11,937, many neutrophils and no bacteria seen on direct exam  Moderate calcium pyrophosphate crystals noted  Vancomycin discontinued as this appears to be an acute pseudogout attack    CRP is elevated at 402.1    No other acute issues noted    Labs, X rays reviewed: 11/15/2022    BUN: 16  Cr: 0.89    WBC: 17.7  Hb: 13.3  Plat:  467    CRP: 391.1-->372.7-->402.1      Cultures:  Urine:    Blood:  11-11-22: No growth  11-12-22: No growth  Sputum :    Left ankle bursa aspirate:  11/12/22: No bacteria seen  MRSA Nares:  11/12/22: Not detected    Imaging:     CXR 11-11-22      Left ankle 11-11-22      Discussed with patient, RN, family. I have personally reviewed the past medical history, past surgical history, medications, social history, and family history, and I have updated the database accordingly. Past Medical History:     Past Medical History:   Diagnosis Date    Abnormal EKG     anterior fascicular block    Bladder tumor     CAD (coronary artery disease)     Clinical trial participant at discharge 03/24/2016    disqualified 3/24/2016    COPD (chronic obstructive pulmonary disease) ([facility] [address]     Depression     GERD (gastroesophageal reflux disease)     Hearing loss     History of blood transfusion     Hyperlipidemia     Hypertension 2012    Panic attacks     Prostate hypertrophy     Under care of team     Dr. Ra Jones, cardiology    Wellness examination     -PCP seen in Dec. 2019       Past Surgical  History:     Past Surgical History:   Procedure Laterality Date    BLADDER TUMOR EXCISION  01/15/2016    TURB with gyrus    CARDIAC DEFIBRILLATOR PLACEMENT  09/22/2016    Unsafe Pacemaker. [place] 9881 ICD Model # A778150.  RV Lead [employer] [place] SG Model # [id number]    CORONARY ARTERY BYPASS GRAFT  03/27/2016    X 4 Emergent    CORONARY ARTERY BYPASS GRAFT  04/01/2016    CYSTOSCOPY      CYSTOSCOPY  01/15/2016    CYSTOSCOPY  10/26/2022    CYSTOSCOPY BLADDER BIOPSY, FULGARATION    CYSTOSCOPY N/A 10/26/2022    CYSTOSCOPY BLADDER BIOPSY, FULGARATION performed by Crys Gage MD at [place] [address] Right     LIPOMA RESECTION  child    anterior neck    TONSILLECTOMY AND ADENOIDECTOMY  child       Medications:      colchicine  1.2 mg Oral Once    colchicine  0.6 mg Oral BID    ferrous sulfate  325 mg Oral Every Other Day    polyethylene glycol  17 g Oral Daily    atorvastatin  40 mg Oral Nightly    buPROPion  450 mg Oral QAM    [Held by provider] clopidogrel  75 mg Oral Daily    fenofibrate  54 mg Oral Daily    budesonide-formoterol  2 puff Inhalation BID    furosemide  40 mg Oral Daily    lisinopril  2.5 mg Oral Daily    metoprolol tartrate  25 mg Oral BID    traZODone  150 mg Oral Nightly    vilazodone HCl  20 mg Oral Daily    sodium chloride flush  5-40 mL IntraVENous 2 times per day    enoxaparin  40 mg SubCUTAneous Daily    gabapentin  100 mg Oral TID    sennosides-docusate sodium  2 tablet Oral Daily    ARIPiprazole  10 mg Oral Daily       Social History:     Social History     Socioeconomic History    Marital status:      Spouse name: Not on file    Number of children: 1    Years of education: Not on file    Highest education level: Not on file   Occupational History    Occupation: [employer] man   Tobacco Use    Smoking status: Former     Packs/day: 0.50     Types: Cigars, Cigarettes     Start date: 1966     Quit date: 2021     Years since quittin.0    Smokeless tobacco: Former     Types: Snuff, Chew     Quit date: 3/23/2016   Vaping Use    Vaping Use: Never used   Substance and Sexual Activity    Alcohol use:  Yes     Alcohol/week: 7.0 standard drinks     Types: 7 Cans of beer per week     Comment: occasionally    Drug use: Yes     Types: Marijuana Tino Draper)    Sexual activity: Not on file   Other Topics Concern    Not on file   Social History Narrative    Not on file     Social Determinants of Health     Financial Resource Strain: Low Risk     Difficulty of Paying Living Expenses: Not hard at all   Food Insecurity: No Food Insecurity    Worried About [address] in the Last Year: Never true    [address] [facility] [address] in the Last Year: Never true Transportation Needs: No Transportation Needs    Lack of Transportation (Medical): No    Lack of Transportation (Non-Medical): No   Physical Activity: Insufficiently Active    Days of Exercise per Week: 3 days    Minutes of Exercise per Session: 30 min   Stress: Stress Concern Present    Feeling of Stress : Very much   Social Connections: Socially Isolated    Frequency of Communication with Friends and Family: Never    Frequency of Social Gatherings with Friends and Family: More than three times a week    Attends Sabianist Services: Never    Active Member of Clubs or Organizations: No    Attends Club or Organization Meetings: Never    Marital Status:    Intimate Partner Violence: Not At Risk    Fear of Current or Ex-Partner: No    Emotionally Abused: No    Physically Abused: No    Sexually Abused: No   Housing Stability: Unknown    Unable to Pay for Housing in the Last Year: No    Number of Places Lived in the Last Year: Not on file    Unstable Housing in the Last Year: No       Family History:     Family History   Problem Relation Age of Onset    Other Mother         blind    Heart Surgery Mother     Cancer Father         leukemia        Allergies:   Patient has no known allergies. Review of Systems:   Constitutional: No fevers or chills. No systemic complaints  Head: No headaches  Eyes: No double vision or blurry vision. No conjunctival inflammation. ENT: No sore throat or runny nose. . No hearing loss, tinnitus or vertigo. Cardiovascular: No chest pain or palpitations. No shortness of breath. No TALBOT  Lung: No shortness of breath or cough. No sputum production  Abdomen: No nausea, vomiting, diarrhea, or abdominal pain. [place] No cramps. Genitourinary: No increased urinary frequency, or dysuria. No hematuria. No suprapubic or CVA pain  Musculoskeletal: left ankle pain, bilateral lower extremity pain and weakness  Neurologic: No headache, weakness, numbness, or tingling.   Integument: No rash, no ulcers. Psychiatric: No depression. Endocrine: No polyuria, no polydipsia, no polyphagia. Physical Examination :   Patient Vitals for the past 8 hrs:   BP Temp Temp src Pulse Resp SpO2 Weight   11/15/22 1100 111/61 97.9 °F (36.6 °C) Oral 91 20 97 % --   11/15/22 0725 122/70 98.2 °F (36.8 °C) Oral 98 22 96 % --   11/15/22 0519 -- -- -- -- 20 -- --   11/15/22 0421 -- -- -- -- -- -- 200 lb (90.7 kg)   11/15/22 0315 116/70 98.1 °F (36.7 °C) Oral 92 28 94 % --     General Appearance: Awake, alert, and in no apparent distress  Head:  Normocephalic, no trauma  Eyes: Pupils equal, round, reactive to light and accommodation; extraocular movements intact; sclera anicteric; conjunctivae pink. No embolic phenomena. ENT: Oropharynx clear, without erythema, exudate, or thrush. No tenderness of sinuses. Mouth/throat: mucosa pink and moist. No lesions. Dentition in good repair. Neck:Supple, without lymphadenopathy. Thyroid normal, No bruits. Pulmonary/Chest: Clear to auscultation, without wheezes, rales, or rhonchi. No dullness to percussion. Cardiovascular: Regular rate and rhythm without murmurs, rubs, or gallops. Abdomen: Soft, non tender. Bowel sounds normal. No organomegaly  All four Extremities: no swelling, tenderness on palapation of left ankle, tenderness on palpation of lower extremity, restricted ROM left ankle, no warmth or redness. Neurologic: No gross sensory or motor deficits. Skin: Warm and dry with good turgor. No signs of peripheral arterial or venous insufficiency. No ulcerations. No open wounds. Medical Decision Making -Laboratory:   I have independently reviewed/ordered the following labs:    CBC with Differential:   No results for input(s): WBC, HGB, HCT, PLT, SEGSPCT, BANDSPCT, LYMPHOPCT, MONOPCT, EOSPCT in the last 72 hours.     BMP:   Recent Labs     11/14/22  0527   K 4.0   MG 1.9     Hepatic Function Panel:   No results for input(s): PROT, LABALBU, BILIDIR, IBILI, BILITOT, ALKPHOS, ALT, AST in the last 72 hours. No results for input(s): RPR in the last 72 hours. No results for input(s): HIV in the last 72 hours. No results for input(s): BC in the last 72 hours. Lab Results   Component Value Date/Time    MUCUS 1+ 03/26/2016 02:30 PM    RBC 5.56 11/11/2022 06:18 PM    RBC 4.85 02/13/2012 11:22 AM    TRICHOMONAS NOT REPORTED 03/26/2016 02:30 PM    WBC 17.7 11/11/2022 06:18 PM    YEAST NOT REPORTED 03/26/2016 02:30 PM    TURBIDITY Clear 11/11/2022 09:15 PM     Lab Results   Component Value Date/Time    CREATININE 0.89 11/12/2022 02:19 AM    GLUCOSE 102 11/12/2022 02:19 AM    GLUCOSE 105 02/13/2012 11:22 AM       Medical Decision Making-Imaging:     Narrative   EXAMINATION:   THREE XRAY VIEWS OF THE LEFT FOOT       11/12/2022 6:51 am       COMPARISON:   None. HISTORY:   ORDERING SYSTEM PROVIDED HISTORY: infection   TECHNOLOGIST PROVIDED HISTORY:   infection       FINDINGS:   There is no acute osseous abnormality. The joint spaces are maintained. There is soft tissue swelling. Impression   Soft tissue swelling without acute osseous abnormality. Narrative   EXAMINATION:   THREE XRAY VIEWS OF THE LEFT ANKLE       11/12/2022 6:51 am       COMPARISON:   None. HISTORY:   ORDERING SYSTEM PROVIDED HISTORY: infection   TECHNOLOGIST PROVIDED HISTORY:   infection       FINDINGS:   There is no acute osseous abnormality. The joint spaces are maintained. The   surrounding soft tissues are unremarkable. Impression   No acute osseous or soft tissue abnormality. Narrative   EXAMINATION:   ONE XRAY VIEW OF THE CHEST       11/11/2022 7:51 pm       COMPARISON:   05/17/2016       HISTORY:   ORDERING SYSTEM PROVIDED HISTORY: Sepsis   TECHNOLOGIST PROVIDED HISTORY:   Sepsis   Reason for Exam: upr,leg pain,sepsis       Initial encounter       FINDINGS:   Status post median sternotomy. Cardiac AICD is noted. No focal   consolidation, pleural effusion or pneumothorax.   The cardiomediastinal   silhouette is stable. No overt pulmonary edema. The osseous structures are   stable. Impression   No acute cardiopulmonary findings. Narrative   EXAMINATION:   CT OF THE LEFT FOOT WITHOUT CONTRAST 11/11/2022 6:36 pm       TECHNIQUE:   CT of the left foot was performed without the administration of intravenous   contrast.  Multiplanar reformatted images are provided for review. Automated   exposure control, iterative reconstruction, and/or weight based adjustment of   the mA/kV was utilized to reduce the radiation dose to as low as reasonably   achievable. COMPARISON:   None. HISTORY   ORDERING SYSTEM PROVIDED HISTORY: possible tarsal avulsion seen on prior xray   TECHNOLOGIST PROVIDED HISTORY:   possible tarsal avulsion seen on prior xray   Decision Support Exception - unselect if not a suspected or confirmed   emergency medical condition->Emergency Medical Condition (MA)       FINDINGS:   The distal tibia and fibula are intact. No syndesmotic widening. The ankle   mortise is intact. Mild chondrocalcinosis of the tibiotalar joint. The   tibial plafond and talar dome are normal in appearance. The subtalar joint   is unremarkable. The talonavicular and calcaneocuboid joints are   unremarkable. The naviculocuneiform articulations are unremarkable. Mild   2nd tarsometatarsal degenerative changes. Normal midfoot alignment is   maintained. No Lisfranc interval widening. Mild chondrocalcinosis of the   midfoot. The metatarsophalangeal and interphalangeal joints are intact. No   fracture or dislocation identified. No osseous erosion or periosteal   reaction. Mild atherosclerotic vascular calcifications. The visualized tendons are   grossly intact. Dorsal mid and forefoot subcutaneous edema. Circumferential   subcutaneous edema about the ankle. No soft tissue gas or drainable fluid   collection. Impression   1. No acute osseous abnormality. Normal midfoot alignment. 2. Nonspecific subcutaneous edema. No drainable fluid collection or soft   tissue gas. Narrative   EXAMINATION:   CT OF THE LUMBAR SPINE WITH CONTRAST  11/14/2022 11:58 am       TECHNIQUE:   CT of the lumbar spine was performed with the administration of intravenous   contrast. Multiplanar reformatted images are provided for review. Automated   exposure control, iterative reconstruction, and/or weight based adjustment of   the mA/kV was utilized to reduce the radiation dose to as low as reasonably   achievable. COMPARISON:   Lumbar spine radiographs 11/08/2008       HISTORY:   ORDERING SYSTEM PROVIDED HISTORY: concern for slipped disc as well as   possible mass/psoas abbcess or lytic/blasic lesions suggesting underlying   maligancy contributing to possible pathological fracture. TECHNOLOGIST PROVIDED HISTORY:   concern for slipped disc as well as possible mass/psoas abbcess or   lytic/blasic lesions suggesting underlying maligancy contributing to possible   pathological fracture. FINDINGS:   BONES/ALIGNMENT:  There is a levoconvex lumbar scoliosis. There is   multilevel disc space narrowing and degenerative endplate sclerosis   throughout the lower thoracic and lumbar spine. There is no spondylolysis. No lytic or blastic osseous lesions are identified. SOFT TISSUES: There is no paraspinal mass identified. There is no abnormal   fluid collection identified. Severe atherosclerotic calcifications are   present within the abdominal aorta. L1-L2: There is a disc bulge and osteophyte formation. There is bilateral   facet arthropathy. There is severe left neural foraminal narrowing. No   significant spinal canal stenosis or right neural foraminal narrowing is   present. L2-L3: There is a disc bulge, facet arthropathy and thickening of the   ligamentum flavum. There is severe left neural foraminal narrowing.   There   is no significant right neural foraminal narrowing. Mild spinal canal   stenosis is present. L3-L4: There is a disc bulge, facet arthropathy and thickening of the   ligamentum flavum. , severe right and moderate left neural foraminal   narrowing. There is moderate spinal canal stenosis. L4-L5: There is a disc bulge, facet arthropathy and thickening of the   ligamentum flavum. There is severe spinal canal stenosis, severe right and   mild left neural foraminal narrowing. L5-S1: There is a disc bulge, posterior osteophyte formation and facet   arthropathy. There is no significant spinal canal stenosis. Severe   bilateral neural foraminal narrowing is present. Impression   1. No findings to suggest acute discitis/osteomyelitis. 2. No abscess identified. 3. Severe multilevel degenerative changes of the lumbar spine, as described   above, but most pronounced at L4-5 where there is severe spinal canal   stenosis, severe right and mild left neural foraminal narrowing. Medical Decision Lfckgb-Ekcyayoy-Uoemu:       Medical Decision Making-Other:     Note:  Labs, medications, radiologic studies were reviewed with personal review of films  Large amounts of data were reviewed  Discussed with nursing Staff, Discharge planner  Infection Control and Prevention measures reviewed  All prior entries were reviewed  Administer medications as ordered  Prognosis: Good  Discharge planning reviewed  Follow up as outpatient. Thank you for allowing us to participate in the care of this patient. Please call with questions. Misbah Monteiro, APRN - CNP    ATTESTATION:    I have discussed the case, including pertinent history and exam findings with the APRN. I have evaluated the  History, physical findings and pictures of the patient and the key elements of the encounter have been performed by me. I have reviewed the laboratory data, other diagnostic studies and discussed them with the APRN.  I have updated the medical record where necessary. I agree with the assessment, plan and orders as documented by the APRN.     Deacon Olmstead MD.      Pager: [phone] - Office: [phone]

## 2022-11-15 NOTE — PLAN OF CARE
Neurosurgery    CT myelogram ordered for lumbar spine to assess stenosis as patient has AICD and unable to obtain MRI     Electronically signed by PRESTON Diego NP on 11/15/2022 at 9:53 AM

## 2022-11-15 NOTE — CARE COORDINATION
Received vm message from caesar with [facility] stating they cannot accept. 1700: received vm message from Juan with [address] stating they can accept. [phone]: call placed to bhavani with [facility] and left vm message to notify that I received her message and that this patient is not quite ready for discharge and that CM from [place] [facility] will initiate precert with [facility] through my nexus.  CM left direct phone number for bhavani for any questions

## 2022-11-15 NOTE — PROGRESS NOTES
Physician Progress Note      PATIENT:               Julio Baumgarten  CSN #:                  [id number]  :                       1950  ADMIT DATE:       2022 3:49 PM  [address] [place] DATE:  RESPONDING  PROVIDER #:        Bonny Chou MD          QUERY TEXT:    Pt admitted with Sepsis. Pt noted to have pseudogout. If possible, please   document in the progress notes and discharge summary the status of the Sepsis. The medical record reflects the following:  Risk Factors: Left ankle effusion after rolling ankle during a fall. Clinical Indicators: Per H&P: Sepsis with likely source of soft tissue   infection of left ankle possible inflammatory response. .1, Lactic acid   1.8, WBC 17.7 Temp 38.2  Per ID consult:  Left ankle effusion and pain after rolling his ankle during a   fall. Low likelihood of septic arthritis. Possible crystal arthropathy. Per   IM progress note: Ankle pain due to Pseudogout:  Body fluid positive for   Calcium pyrophosphate crystals. Start Colchicine. Treatment: ID consult, IV Zosyn and Vanco, Colchicine, labs/monitoring. Oleg Harden RN, CDS  [email or web address]  Options provided:  -- Sepsis was ruled out. Pseudo gout  -- Sepsis, present on admission  -- Sepsis, present on admission, now resolved  -- Other - I will add my own diagnosis  -- Disagree - Not applicable / Not valid  -- Disagree - Clinically unable to determine / Unknown  -- Refer to Clinical Documentation Reviewer    PROVIDER RESPONSE TEXT:    After further study, sepsis was ruled out for this patient. This patient has   pseudo gout.     Query created by: Anand Casey on 11/15/2022 2:04 PM      Electronically signed by:  Bonny Chou MD 11/15/2022 2:06 PM

## 2022-11-15 NOTE — PLAN OF CARE
Problem: Discharge Planning  Goal: Discharge to home or other facility with appropriate resources  Outcome: Progressing     Problem: Pain  Goal: Verbalizes/displays adequate comfort level or baseline comfort level  Outcome: Progressing  Flowsheets  Taken 11/14/2022 1915 by Jacquelin Menezes RN  Verbalizes/displays adequate comfort level or baseline comfort level: Encourage patient to monitor pain and request assistance  Taken 11/14/2022 1615 by Madina Elliott RN  Verbalizes/displays adequate comfort level or baseline comfort level: Encourage patient to monitor pain and request assistance     Problem: Safety - Adult  Goal: Free from fall injury  Outcome: Progressing     Problem: ABCDS Injury Assessment  Goal: Absence of physical injury  Outcome: Progressing     Problem: Respiratory - Adult  Goal: Achieves optimal ventilation and oxygenation  Outcome: Progressing     Problem: Skin/Tissue Integrity  Goal: Absence of new skin breakdown  Description: 1. Monitor for areas of redness and/or skin breakdown  2. Assess vascular access sites hourly  3. Every 4-6 hours minimum:  Change oxygen saturation probe site  4. Every 4-6 hours:  If on nasal continuous positive airway pressure, respiratory therapy assess nares and determine need for appliance change or resting period.   Outcome: Progressing

## 2022-11-15 NOTE — PROGRESS NOTES
[facility]  Office: [address], DO, Shannan , DO, Tonya Sender, DO, Rex December Blood, DO, Ariana Valle MD, Jj Luna MD, Aftab Bartlett MD, Diane Villa MD,  Delfino Perkins MD, Tracey Márquez MD, Dariela Mariscal, DO, Melene Boast, MD,  Cynthia Roca MD, Mike Barker MD, Jaron Manzo, DO, Quinton Hernandez MD, Juju Ortega MD, Yasmin Ford, DO, Bradley Mendez MD, Arian Claudio MD, Dung Fragoso MD, Deloris Keller MD, Fernand Dance, DO, Elías Ayon MD, Nell Stover MD, Brain West, CNP,  Gilson Ortiz, CNP, Juarez Nelson, CNP, Nubia Moran, CNP,  Erik Rosales, [facility], Luigi Brandt, CNP, Reed Martinez, CNP, María Rincon, CNP, Sesar Ball, CNP, César Gonzalez, CNP, Ascencion Avila PA-C, Rocio Harmon, CNS, Baljeet Dixon, [facility], Milind Alcantar, CNP, Jazzmine Hartman, CNP, Lucas Saucedo, [facility]    Progress Note    11/15/2022    9:18 AM    Name:   Matheus Solis  MRN:     [id number]     Acct:      [de-identified]   Room:   2005/2005-01  IP Day:  4  Admit Date:  11/11/2022  3:49 PM    PCP:   Alessandro Bull MD  Code Status:  Full Code    Subjective:     C/C:   Chief Complaint   Patient presents with    Ankle Pain     Interval History Status: not changed. Still continues to have pain request that I did not examine his feet, as there is evidence of allodynia. Brief History: This is a 70year old male who presents to our hospital with complaints of left foot pain. CT of his left foot showed no acute osseous abnormality and normal midfoot alignment but did show subcutaneous edema without any drainable  fluid collection or soft tissue gas. Pt was febrile with Tmax 100.8, with WBC 17 and left shift. There was concern for septic arthritis. Orthopedic surgery was consulted, he has aspiration of joint which showed white count 11,937, 96% neutrophil count.   Gram stain/culture and crystals were ordered and pending: He was seen by Infectious disease and started him on vancomycin. A CT with contrast of his lumbar spine was done due to leg weakness and concern for possible abscess which showed:     Review of Systems:     Constitutional:  negative for chills, fevers, sweats  Respiratory:  negative for cough, dyspnea on exertion, shortness of breath, wheezing  Cardiovascular:  negative for chest pain, chest pressure/discomfort, lower extremity edema, palpitations  Gastrointestinal:  negative for abdominal pain, constipation, diarrhea, nausea, vomiting  Neurological:  negative for dizziness, headache, he is complaining of bilateral lower extremity leg weakness no urinary tension  EXT: admits to severe pain in left foot making it difficult to ambulate extremity     Medications:      Allergies:  No Known Allergies    Current Meds:   Scheduled Meds:    colchicine  1.2 mg Oral Once    colchicine  0.6 mg Oral BID    ferrous sulfate  325 mg Oral Every Other Day    polyethylene glycol  17 g Oral Daily    atorvastatin  40 mg Oral Nightly    buPROPion  450 mg Oral QAM    [Held by provider] clopidogrel  75 mg Oral Daily    fenofibrate  54 mg Oral Daily    budesonide-formoterol  2 puff Inhalation BID    furosemide  40 mg Oral Daily    lisinopril  2.5 mg Oral Daily    metoprolol tartrate  25 mg Oral BID    traZODone  150 mg Oral Nightly    vilazodone HCl  20 mg Oral Daily    sodium chloride flush  5-40 mL IntraVENous 2 times per day    enoxaparin  40 mg SubCUTAneous Daily    gabapentin  100 mg Oral TID    sennosides-docusate sodium  2 tablet Oral Daily    ARIPiprazole  10 mg Oral Daily     Continuous Infusions:    sodium chloride 25 mL/hr at 11/13/22 0213     PRN Meds: potassium chloride **OR** potassium alternative oral replacement **OR** potassium chloride, magnesium sulfate, hydrOXYzine HCl, sodium chloride flush, sodium chloride, acetaminophen **OR** acetaminophen, albuterol, oxyCODONE    Data:     Past Medical History:   has a past medical history of Abnormal EKG, Bladder tumor, CAD (coronary artery disease), Clinical trial participant at discharge, COPD (chronic obstructive pulmonary disease) ([address] Depression, GERD (gastroesophageal reflux disease), Hearing loss, History of blood transfusion, Hyperlipidemia, Hypertension, Panic attacks, Prostate hypertrophy, Under care of team, and Wellness examination. Social History:   reports that he quit smoking about a year ago. His smoking use included cigars and cigarettes. He started smoking about 56 years ago. He smoked an average of .5 packs per day. He quit smokeless tobacco use about 6 years ago. His smokeless tobacco use included snuff and chew. He reports current alcohol use of about 7.0 standard drinks per week. He reports current drug use. Drug: Marijuana Shellye Nuraitt). Family History:   Family History   Problem Relation Age of Onset    Other Mother         blind    Heart Surgery Mother     Cancer Father         leukemia       Vitals:  /70   Pulse 98   Temp 98.2 °F (36.8 °C) (Oral)   Resp 22   Ht 5' 9\" (1.753 m)   Wt 200 lb (90.7 kg)   SpO2 96%   BMI 29.53 kg/m²   Temp (24hrs), Av.3 °F (36.8 °C), Min:97.2 °F (36.2 °C), Max:99.1 °F (37.3 °C)    Recent Labs     22   POCGLU 90       I/O (24Hr):     Intake/Output Summary (Last 24 hours) at 11/15/2022 0918  Last data filed at 11/15/2022 0430  Gross per 24 hour   Intake 200 ml   Output 1100 ml   Net -900 ml       Labs:  Hematology:  Recent Labs     22  0827 11/15/22  0307   .7* 402.1*     Chemistry:  Recent Labs     22  05   K 4.0   MG 1.9     Recent Labs     22   POCGLU 90     ABG:  Lab Results   Component Value Date/Time    POCPH 7.41 2016 05:31 AM    POCPCO2 37 2016 05:31 AM    POCPO2 105 2016 05:31 AM    POCHCO3 23.3 2016 05:31 AM    NBEA 1 2016 05:31 AM    PBEA NOT REPORTED 2016 05:31 AM XLJ8KLG 24 03/28/2016 05:31 AM    LZEB1YKG 98 03/28/2016 05:31 AM    FIO2 40.0 03/28/2016 05:31 AM     Lab Results   Component Value Date/Time    SPECIAL LEFT AC 3ML 11/12/2022 02:19 AM    SPECIAL LEFT FOREARM 2ML 11/12/2022 02:19 AM     Lab Results   Component Value Date/Time    CULTURE NO GROWTH 3 DAYS 11/12/2022 08:54 AM       Radiology:  XR ANKLE LEFT (MIN 3 VIEWS)    Result Date: 11/12/2022  No acute osseous or soft tissue abnormality. XR FOOT LEFT (MIN 3 VIEWS)    Result Date: 11/12/2022  Soft tissue swelling without acute osseous abnormality. XR CHEST PORTABLE    Result Date: 11/11/2022  No acute cardiopulmonary findings. CT FOOT LEFT WO CONTRAST    Result Date: 11/11/2022  1. No acute osseous abnormality. Normal midfoot alignment. 2. Nonspecific subcutaneous edema. No drainable fluid collection or soft tissue gas. Physical Examination:        General appearance:  alert, cooperative and no distress  Mental Status:  oriented to person, place and time and normal affect  Lungs:  clear to auscultation bilaterally, normal effort  Heart:  regular rate and rhythm, no murmur  Abdomen:  soft, nontender, nondistended, normal bowel sounds, no masses, hepatomegaly, splenomegaly  Extremities:  no edema, redness, tenderness in the calves. There is  pain with palpation of left leg, he is restricted with active and passive movement.   Muscle strength 4 out of 5 bilaterally  Skin:  no gross lesions, rashes, induration there is some erythema noted on left leg    Assessment:        Hospital Problems             Last Modified POA    * (Principal) Pseudogout of ankle 11/14/2022 Yes    Sepsis ([address] 11/12/2022 Yes    Acute left ankle pain 11/12/2022 Yes    Left leg weakness 11/12/2022 Yes    Primary hypertension 11/12/2022 Yes    Ischemic cardiomyopathy 11/12/2022 Yes    Microcytosis 11/12/2022 Yes    Class 1 obesity due to excess calories with serious comorbidity and body mass index (BMI) of 31.0 to 31.9 in adult 11/12/2022 Yes    COPD (chronic obstructive pulmonary disease) ([facility] 11/12/2022 Yes    Monoarticular arthritis 11/12/2022 Yes    Fall 11/12/2022 Yes    SIRS (systemic inflammatory response syndrome) ([facility] 11/14/2022 Yes    Hyperlipidemia 11/12/2022 Yes    S/P cardiac cath- Multivessel CAD 3/24/16-Dr. winters 11/12/2022 Yes    Acute hypokalemia 11/12/2022 Yes    S/P CABG x 4 11/12/2022 Yes      Plan:        Ankle pain due to Pseudogout:  Body fluid positive for Calcium pyrophosphate crystals. Start Colchicine. Outpatient orthopedic surgery evaluation for possible steroid injection. Stop abx THERE IS NO EVIDENCE OF SEPSIS  Bilateral leg weakness: Myelogram is pending will likely need outpatient follow-up  Microcytosis without anemia: Tsat: 7%. continue iron supplementation. BPH: No retention. History of ischemic cardiomyopathy with EF of 20 to 25% on echo from 2016: Currently appears compensated. He has AICD in place. Continue  home lasix, lisinopril and BB. History of coronary artery disease s/p CABG x4: Hold plavix incase pt needs surgical intervention continue lipitor. COPD: Stable. No exacerbation  Primary HTN: stable.    Toxic thyroid nodule has follow-up at [address] [place] [address],[address]: Plan for d/c pending final neurosurgery evaluation    Chandler Lombard, MD  11/15/2022  9:18 AM

## 2022-11-15 NOTE — BRIEF OP NOTE
Brief Postoperative Note lumbar Myelogram     Balta Anderson  YOB: 1950  [id number]    Pre-operative Diagnosis: lumbar pain    Post-operative Diagnosis: Same    Procedure: Fluoro guided Myelogram    Anesthesia: 1% Lidocaine    Surgeons/Assistants: Luis Enrique Hadley PA-C and Cristal Palomares MD    Complications: None    EBL: 1mL      Specimens: were not obtained    Fluoro guided lumbar myelogram with 22 gauge spinal needle performed successfully. 8 ml 200 Isovue-M contrast injected. CT to follow. Dressing applied. Vital signs were reviewed and were stable after the procedure.       Electronically signed by JANY Guevara on 11/15/2022 at 1:37 PM

## 2022-11-15 NOTE — PLAN OF CARE
Problem: Discharge Planning  Goal: Discharge to home or other facility with appropriate resources  Outcome: Progressing     Problem: Pain  Goal: Verbalizes/displays adequate comfort level or baseline comfort level  Outcome: Progressing     Problem: Safety - Adult  Goal: Free from fall injury  Outcome: Progressing     Problem: ABCDS Injury Assessment  Goal: Absence of physical injury  Outcome: Progressing     Problem: Respiratory - Adult  Goal: Achieves optimal ventilation and oxygenation  Outcome: Progressing     Problem: Skin/Tissue Integrity  Goal: Absence of new skin breakdown  Description: 1. Monitor for areas of redness and/or skin breakdown  2. Assess vascular access sites hourly  3. Every 4-6 hours minimum:  Change oxygen saturation probe site  4. Every 4-6 hours:  If on nasal continuous positive airway pressure, respiratory therapy assess nares and determine need for appliance change or resting period.   Outcome: Progressing

## 2022-11-15 NOTE — PROGRESS NOTES
Physical Therapy  Facility/Department: [facility] CAR 2- STEPDOWN  Physical Therapy Initial Assessment    Name: Alana Cox  : 1950  MRN: [id number]  Date of Service: 11/15/2022    Chief Complaint   Patient presents with    Ankle Pain       Discharge Recommendations:    Further therapy recommended at discharge. PT Equipment Recommendations  Other: CTA pending progress      Patient Diagnosis(es): The encounter diagnosis was Sepsis without acute organ dysfunction, due to unspecified organism [facility]). Past Medical History:  has a past medical history of Abnormal EKG, Bladder tumor, CAD (coronary artery disease), Clinical trial participant at discharge, COPD (chronic obstructive pulmonary disease) ([facility] Depression, GERD (gastroesophageal reflux disease), Hearing loss, History of blood transfusion, Hyperlipidemia, Hypertension, Panic attacks, Prostate hypertrophy, Under care of team, and Wellness examination. Past Surgical History:  has a past surgical history that includes Tonsillectomy and adenoidectomy (child); Cystoscopy; lipoma resection (child); Cystoscopy (01/15/2016); Coronary artery bypass graft (2016); Cardiac defibrillator placement (2016); hernia repair (Right); bladder tumor excision (01/15/2016); Cystocopy (10/26/2022); Cystoscopy (N/A, 10/26/2022); and Coronary artery bypass graft (2016). Assessment   Body Structures, Functions, Activity Limitations Requiring Skilled Therapeutic Intervention: Decreased functional mobility ; Decreased strength;Decreased endurance;Decreased balance  Assessment: Pt grossly maxAx2 BM d/t pain, declined standing, Nancy EOB ~6min, limited tolerance to LE activities EOB. Pt would benefit from continued acute PT to address deficits.   Therapy Prognosis: Good  Decision Making: High Complexity  Requires PT Follow-Up: Yes  Activity Tolerance  Activity Tolerance: Patient limited by fatigue;Patient limited by pain     Plan   Physcial Therapy Plan  General Plan: (5-6x/wk)  Current Treatment Recommendations: Strengthening, Balance training, Gait training, Functional mobility training, Stair training, Transfer training, Endurance training, Home exercise program, Safety education & training, Patient/Caregiver education & training, Therapeutic activities, Equipment evaluation, education, & procurement  Safety Devices  Type of Devices: Call light within reach, Nurse notified, Patient at risk for falls, All fall risk precautions in place, Left in bed, Bed alarm in place  Restraints  Restraints Initially in Place: No     Restrictions  Restrictions/Precautions  Restrictions/Precautions: Fall Risk  Required Braces or Orthoses?: No  Implants present? :  (AICD)  Required Braces or Orthoses  Left Lower Extremity Brace: Boot  LLE Brace Type: LLE \"ok for walking boot when up and moving\" per ortho note filed 11/13  Position Activity Restriction  Other position/activity restrictions: up with assist     Subjective   General  Chart Reviewed: Yes  Patient assessed for rehabilitation services?: Yes  Response To Previous Treatment: Patient with no complaints from previous session. Family / Caregiver Present: No  Follows Commands: Within Functional Limits  General Comment  Comments: RN and pt agreeable to PT. Pt alert in bed upon arrival. partial OT co-treat  Subjective  Subjective: Pt c/o 9-9.5/10 pain in back and legs at start, reports 8-8.5/10 once EOB.  After session with legs raised reports 5.5/10         Social/Functional History  Social/Functional History  Lives With: Friend(s)  Type of Home: House  Home Layout: Two level, 1/2 bath on main level, Bed/Bath upstairs, Able to Live on Main level with bedroom/bathroom  Home Access: Stairs to enter without rails  Entrance Stairs - Number of Steps: 3  Bathroom Shower/Tub: Tub/Shower unit  Bathroom Toilet: Handicap height  Bathroom Equipment: Grab bars in [address] [place]: Cane, Rollator (per prior session, pt confirms cane, states walker with no wheels and a seat.)  ADL Assistance: [facility] [facility] [address]: Independent  Homemaking Responsibilities: Yes  Ambulation Assistance: Independent (normally uses st cane per pt, uses rwalker on \"bad days\" with LE and back pain. Pt states he's had this pain in his back and legs before)  Transfer Assistance: Independent  Active : No  Patient's  Info: pt states he hasn't driven in ~4 months d/t dizziness  Mode of Transportation: Friends  Occupation: Retired  Leisure & Hobbies: tv  Additional Comments: Friend/roomate suffers from alcoholism and likely not able to assist pt at d/c         Cognition   Orientation  Overall Orientation Status: Within Functional Limits  Orientation Level: Oriented to place;Oriented to time;Oriented to situation;Oriented to person  Cognition  Overall Cognitive Status: WFL     Objective       PROM RLE (degrees)  RLE General PROM: poor tolerance  AROM RLE (degrees)  RLE General AROM: minimal observed, AAROM LAQ to ~45deg knee flexion  PROM LLE (degrees)  LLE General PROM: pain limiting  AROM LLE (degrees)  LLE General AROM: minimal observed, AAROM LAQ to ~60deg knee flexion pain limiting  AROM RUE (degrees)  RUE AROM : WFL  AROM LUE (degrees)  LUE AROM : WFL  Strength RLE  Comment: some antigravity observed, weakness vs pain limiting. Strength LLE  Comment: some antigravity observed, weakness vs pain limiting. Strength RUE  Strength RUE: WFL  Strength LUE  Strength LUE: WFL        Bed Mobility Training  Bed Mobility Training: Yes  Overall Level of Assistance: Maximum assistance;Assist X2 (pt does well with cues to breath out during transitions and explanations of each step)  Rolling: Moderate assistance  Supine to Sit: Maximum assistance;Assist X2  Sit to Supine: Maximum assistance;Assist X2  Balance  Sitting: Intact (~6 minutes on eOB with CGA.  pt reported some improvement in back pain initially)  Transfer Training  Transfer Training: No (unable to assess d/t back pain)  Bed mobility  Supine to Sit: Maximum assistance;2 Person assistance  Sit to Supine: Maximum assistance;2 Person assistance  Transfers  Comment: Pt declined standing d/t back and BLE pain, though states feels a little better sitting up vs laying down. Balance  Posture: Fair  Sitting - Static: Fair  Sitting - Dynamic: Fair;-  Comments: Pt declined standing, agreeable to attempt next visit  Exercise Treatment: EOB ~6min, Nancy. attempted dynamic activites with BLEs, minimal tolerance. pt returned supine, brief change, modA rolling and to maintain roll Bilat.           AM-PAC Score  AM-PAC Inpatient Mobility Raw Score : 9 (11/15/22 1508)  AM-PAC Inpatient T-Scale Score : 30.55 (11/15/22 1508)  Mobility Inpatient CMS 0-100% Score: 81.38 (11/15/22 1508)  Mobility Inpatient CMS G-Code Modifier : CM (11/15/22 1508)        Goals  Short Term Goals  Time Frame for Short Term Goals: 14 visits  Short Term Goal 1: Pt will be SBA bed mobility  Short Term Goal 2: Pt will be CGA transfers  Short Term Goal 3: Pt will be CGA amb 150' RW  Short Term Goal 4: Pt will navigate 4 steps SBA  Patient Goals   Patient Goals : decrease pain, be able to mobilize       Education  Patient Education  Education Given To: Patient  Education Provided: Role of Therapy;Plan of Care  Education Method: Verbal  Education Outcome: Continued education needed;Verbalized understanding;Demonstrated understanding      Therapy Time   Individual Concurrent Group Co-treatment   Time In 1114         Time Out 1150         Minutes 36          Timed Code Treatment Minutes: 8 (OT co-treat)       Beckie Garza, PT

## 2022-11-15 NOTE — PROGRESS NOTES
Occupational Therapy  Facility/Department: [facility] CAR 2- STEPDOWN  Occupational Therapy Daily Treatment note     Name: Everardo Chicas  : 1950  MRN: [id number]  Date of Service: 11/15/2022    Discharge Recommendations:  Patient would benefit from continued therapy after discharge          Patient Diagnosis(es): The encounter diagnosis was Sepsis without acute organ dysfunction, due to unspecified organism [facility]). Past Medical History:  has a past medical history of Abnormal EKG, Bladder tumor, CAD (coronary artery disease), Clinical trial participant at discharge, COPD (chronic obstructive pulmonary disease) ([facility] [address] Depression, GERD (gastroesophageal reflux disease), Hearing loss, History of blood transfusion, Hyperlipidemia, Hypertension, Panic attacks, Prostate hypertrophy, Under care of team, and Wellness examination. Past Surgical History:  has a past surgical history that includes Tonsillectomy and adenoidectomy (child); Cystoscopy; lipoma resection (child); Cystoscopy (01/15/2016); Coronary artery bypass graft (2016); Cardiac defibrillator placement (2016); hernia repair (Right); bladder tumor excision (01/15/2016); Cystocopy (10/26/2022); Cystoscopy (N/A, 10/26/2022); and Coronary artery bypass graft (2016). Assessment   Performance deficits / Impairments: Decreased functional mobility ; Decreased ADL status; Decreased endurance;Decreased balance;Decreased high-level IADLs  Assessment: pt would benefit from further therapy at discharge in order to increase safety and independence. pt significant back pain is impacting ability to complete ADLS and functional transfer/smobility.   Prognosis: Good  Decision Making: Medium Complexity  REQUIRES OT FOLLOW-UP: Yes  Activity Tolerance  Activity Tolerance: Patient limited by pain        Plan   Occupational Therapy Plan  Times Per Week: 3-4x/wk     Restrictions  Restrictions/Precautions  Restrictions/Precautions: Fall Risk  Required Braces or Orthoses?: No  Implants present? :  (AICD)  Required Braces or Orthoses  Left Lower Extremity Brace: Boot  LLE Brace Type: LLE \"ok for walking boot when up and moving\" per ortho note filed 11/13  Position Activity Restriction  Other position/activity restrictions: up with assist    Subjective   General  Patient assessed for rehabilitation services?: Yes  Family / Caregiver Present: No  General Comment  Comments: RN ok'd for therapy this morning. pt agreeable to participate in session and cooperative. pt reported 9/10 back pain while supine, but reported 8/10 while sitting on eOB. Objective                Safety Devices  Type of Devices: Call light within reach; Left in bed;Nurse notified (pt with PT upon exit)  Restraints  Restraints Initially in Place: No    Bed Mobility Training  Bed Mobility Training: Yes  Overall Level of Assistance: Maximum assistance;Assist X2 (pt does well with cues to breath out during transitions and explanations of each step)  Rolling: Moderate assistance  Supine to Sit: Maximum assistance;Assist X2  Sit to Supine: Maximum assistance;Assist X2  Balance  Sitting: Intact (~6 minutes on eOB with CGA. pt reported some improvement in back pain initially)  Transfer Training  Transfer Training: No (unable to assess d/t back pain)          ADL  Toileting: Maximum assistance  Additional Comments: OT facilitated pt in brief change during session. pt required max A for brief change and marvel-care/bottom care d/t back pain.  pt able to assist  with rolling with verbal cues for breathing out during movement                   Cognition  Overall Cognitive Status: [facility]  Orientation  Overall Orientation Status: Within Functional Limits                    Education Given To: Patient  Education Provided: Role of Therapy;Plan of Care;Transfer Training  Education Provided Comments: breathing out during transitions, log roll tech  Education Method: Verbal  Barriers to Learning: None  Education Outcome: Verbalized understanding                                                                        AM-PAC Score        AM-PAC Inpatient Daily Activity Raw Score: 15 (11/15/22 1501)  AM-PAC Inpatient ADL T-Scale Score : 34.69 (11/15/22 1501)  ADL Inpatient CMS 0-100% Score: 56.46 (11/15/22 1501)  ADL Inpatient CMS G-Code Modifier : CK (11/15/22 1501)           Goals  Short Term Goals  Time Frame for Short Term Goals: Pt will by discharge  Short Term Goal 1: demo sitting on EOB unsupported for 5 min+ at mod A during func activity  Short Term Goal 2: demo mod Ax1 for all bed mobility using bed rails PRN  Short Term Goal 3: identify 2 non-pharmacological pain-relieving techniques with 1 cue  Short Term Goal 4: demo ADL UB bathing/dressing activity with increased time and SBA  Short Term Goal 5: demo ADL LB bathing/dressing activity with increased time, AE PRN and mod A  Short Term Goal 6: notify OTR to update goals as pt's mobility increases, etc.       Therapy Time   Individual Concurrent Group Co-treatment   Time In 1112         Time Out 1139         Minutes 27         Timed Code Treatment Minutes: 10 Minutes (co-tx with PT d/t high pain levels and heavy assist required)       Mason Kaiser OTR/L

## 2022-11-16 PROBLEM — M11.89 PSEUDOGOUT INVOLVING MULTIPLE JOINTS: Status: ACTIVE | Noted: 2022-11-16

## 2022-11-16 PROBLEM — M48.061 SPINAL STENOSIS OF LUMBAR REGION AT MULTIPLE LEVELS: Status: ACTIVE | Noted: 2022-11-16

## 2022-11-16 LAB
ABSOLUTE EOS #: 0 K/UL (ref 0–0.4)
ABSOLUTE IMMATURE GRANULOCYTE: 0 K/UL (ref 0–0.3)
ABSOLUTE LYMPH #: 1.12 K/UL (ref 1–4.8)
ABSOLUTE MONO #: 1.12 K/UL (ref 0.1–0.8)
ANION GAP SERPL CALCULATED.3IONS-SCNC: 12 MMOL/L (ref 9–17)
BASOPHILS # BLD: 1 % (ref 0–2)
BASOPHILS ABSOLUTE: 0.12 K/UL (ref 0–0.2)
BILIRUBIN URINE: ABNORMAL
BUN BLDV-MCNC: 25 MG/DL (ref 8–23)
CALCIUM SERPL-MCNC: 9.3 MG/DL (ref 8.6–10.4)
CASTS UA: ABNORMAL /LPF (ref 0–8)
CHLORIDE BLD-SCNC: 99 MMOL/L (ref 98–107)
CO2: 26 MMOL/L (ref 20–31)
COLOR: ABNORMAL
CREAT SERPL-MCNC: 0.8 MG/DL (ref 0.7–1.2)
CULTURE: NORMAL
CULTURE: NORMAL
EOSINOPHILS RELATIVE PERCENT: 0 % (ref 1–4)
EPITHELIAL CELLS UA: ABNORMAL /HPF (ref 0–5)
GFR SERPL CREATININE-BSD FRML MDRD: >60 ML/MIN/1.73M2
GLUCOSE BLD-MCNC: 92 MG/DL (ref 70–99)
GLUCOSE URINE: NEGATIVE
HCT VFR BLD CALC: 34.4 % (ref 40.7–50.3)
HEMOGLOBIN: 11.1 G/DL (ref 13–17)
IMMATURE GRANULOCYTES: 0 %
KETONES, URINE: NEGATIVE
LEUKOCYTE ESTERASE, URINE: ABNORMAL
LYMPHOCYTES # BLD: 9 % (ref 24–44)
Lab: NORMAL
MAGNESIUM: 2.1 MG/DL (ref 1.6–2.6)
MCH RBC QN AUTO: 23.9 PG (ref 25.2–33.5)
MCHC RBC AUTO-ENTMCNC: 32.3 G/DL (ref 28.4–34.8)
MCV RBC AUTO: 74 FL (ref 82.6–102.9)
MONOCYTES # BLD: 9 % (ref 1–7)
MORPHOLOGY: ABNORMAL
MORPHOLOGY: ABNORMAL
MUCUS: ABNORMAL
NITRITE, URINE: NEGATIVE
NRBC AUTOMATED: 0 PER 100 WBC
PDW BLD-RTO: 17.2 % (ref 11.8–14.4)
PH UA: 5.5 (ref 5–8)
PLATELET # BLD: 513 K/UL (ref 138–453)
PMV BLD AUTO: 10.5 FL (ref 8.1–13.5)
POTASSIUM SERPL-SCNC: 3.4 MMOL/L (ref 3.7–5.3)
PROTEIN UA: ABNORMAL
RBC # BLD: 4.65 M/UL (ref 4.21–5.77)
RBC UA: ABNORMAL /HPF (ref 0–4)
SEG NEUTROPHILS: 81 % (ref 36–66)
SEGMENTED NEUTROPHILS ABSOLUTE COUNT: 10.04 K/UL (ref 1.8–7.7)
SODIUM BLD-SCNC: 137 MMOL/L (ref 135–144)
SPECIFIC GRAVITY UA: 1.03 (ref 1–1.03)
SPECIMEN DESCRIPTION: NORMAL
SPECIMEN DESCRIPTION: NORMAL
TURBIDITY: CLEAR
URINE HGB: ABNORMAL
UROBILINOGEN, URINE: NORMAL
WBC # BLD: 12.4 K/UL (ref 3.5–11.3)
WBC UA: ABNORMAL /HPF (ref 0–5)

## 2022-11-16 PROCEDURE — 94640 AIRWAY INHALATION TREATMENT: CPT

## 2022-11-16 PROCEDURE — 6370000000 HC RX 637 (ALT 250 FOR IP): Performed by: NURSE PRACTITIONER

## 2022-11-16 PROCEDURE — 6370000000 HC RX 637 (ALT 250 FOR IP): Performed by: INTERNAL MEDICINE

## 2022-11-16 PROCEDURE — 99232 SBSQ HOSP IP/OBS MODERATE 35: CPT | Performed by: STUDENT IN AN ORGANIZED HEALTH CARE EDUCATION/TRAINING PROGRAM

## 2022-11-16 PROCEDURE — 97530 THERAPEUTIC ACTIVITIES: CPT

## 2022-11-16 PROCEDURE — 6360000002 HC RX W HCPCS: Performed by: STUDENT IN AN ORGANIZED HEALTH CARE EDUCATION/TRAINING PROGRAM

## 2022-11-16 PROCEDURE — 80048 BASIC METABOLIC PNL TOTAL CA: CPT

## 2022-11-16 PROCEDURE — 6360000002 HC RX W HCPCS: Performed by: NURSE PRACTITIONER

## 2022-11-16 PROCEDURE — 36415 COLL VENOUS BLD VENIPUNCTURE: CPT

## 2022-11-16 PROCEDURE — 2580000003 HC RX 258: Performed by: NURSE PRACTITIONER

## 2022-11-16 PROCEDURE — 81001 URINALYSIS AUTO W/SCOPE: CPT

## 2022-11-16 PROCEDURE — 2060000000 HC ICU INTERMEDIATE R&B

## 2022-11-16 PROCEDURE — 83735 ASSAY OF MAGNESIUM: CPT

## 2022-11-16 PROCEDURE — 85025 COMPLETE CBC W/AUTO DIFF WBC: CPT

## 2022-11-16 PROCEDURE — 99232 SBSQ HOSP IP/OBS MODERATE 35: CPT | Performed by: INTERNAL MEDICINE

## 2022-11-16 PROCEDURE — 87086 URINE CULTURE/COLONY COUNT: CPT

## 2022-11-16 RX ORDER — KETOROLAC TROMETHAMINE 15 MG/ML
30 INJECTION, SOLUTION INTRAMUSCULAR; INTRAVENOUS EVERY 6 HOURS PRN
Status: DISCONTINUED | OUTPATIENT
Start: 2022-11-16 | End: 2022-11-18 | Stop reason: HOSPADM

## 2022-11-16 RX ORDER — COLCHICINE 0.6 MG/1
1.2 TABLET ORAL ONCE
Qty: 30 TABLET | Refills: 3 | Status: SHIPPED | OUTPATIENT
Start: 2022-11-16 | End: 2022-11-16

## 2022-11-16 RX ORDER — OXYCODONE HYDROCHLORIDE 5 MG/1
5 TABLET ORAL EVERY 4 HOURS PRN
Qty: 12 TABLET | Refills: 0 | Status: SHIPPED | OUTPATIENT
Start: 2022-11-16 | End: 2022-11-18 | Stop reason: HOSPADM

## 2022-11-16 RX ORDER — COLCHICINE 0.6 MG/1
0.6 TABLET ORAL 2 TIMES DAILY
Qty: 30 TABLET | Refills: 3 | Status: SHIPPED | OUTPATIENT
Start: 2022-11-16

## 2022-11-16 RX ADMIN — COLCHICINE 0.6 MG: 0.6 TABLET, FILM COATED ORAL at 08:00

## 2022-11-16 RX ADMIN — SODIUM CHLORIDE, PRESERVATIVE FREE 10 ML: 5 INJECTION INTRAVENOUS at 08:00

## 2022-11-16 RX ADMIN — FENOFIBRATE 54 MG: 54 TABLET ORAL at 08:00

## 2022-11-16 RX ADMIN — COLCHICINE 0.6 MG: 0.6 TABLET, FILM COATED ORAL at 20:48

## 2022-11-16 RX ADMIN — OXYCODONE 5 MG: 5 TABLET ORAL at 02:04

## 2022-11-16 RX ADMIN — GABAPENTIN 100 MG: 100 CAPSULE ORAL at 13:05

## 2022-11-16 RX ADMIN — BUPROPION HYDROCHLORIDE 450 MG: 150 TABLET, EXTENDED RELEASE ORAL at 08:00

## 2022-11-16 RX ADMIN — METOPROLOL TARTRATE 25 MG: 25 TABLET ORAL at 08:00

## 2022-11-16 RX ADMIN — BUDESONIDE AND FORMOTEROL FUMARATE DIHYDRATE 2 PUFF: 160; 4.5 AEROSOL RESPIRATORY (INHALATION) at 19:55

## 2022-11-16 RX ADMIN — SODIUM CHLORIDE, PRESERVATIVE FREE 10 ML: 5 INJECTION INTRAVENOUS at 20:48

## 2022-11-16 RX ADMIN — TRAZODONE HYDROCHLORIDE 150 MG: 50 TABLET ORAL at 20:47

## 2022-11-16 RX ADMIN — VILAZODONE HYDROCHLORIDE 20 MG: 20 TABLET ORAL at 07:59

## 2022-11-16 RX ADMIN — LISINOPRIL 2.5 MG: 2.5 TABLET ORAL at 08:00

## 2022-11-16 RX ADMIN — OXYCODONE 5 MG: 5 TABLET ORAL at 05:59

## 2022-11-16 RX ADMIN — HYDROXYZINE HYDROCHLORIDE 25 MG: 25 TABLET ORAL at 05:29

## 2022-11-16 RX ADMIN — DOCUSATE SODIUM 50 MG AND SENNOSIDES 8.6 MG 2 TABLET: 8.6; 5 TABLET, FILM COATED ORAL at 08:00

## 2022-11-16 RX ADMIN — FUROSEMIDE 40 MG: 40 TABLET ORAL at 08:00

## 2022-11-16 RX ADMIN — GABAPENTIN 100 MG: 100 CAPSULE ORAL at 20:48

## 2022-11-16 RX ADMIN — KETOROLAC TROMETHAMINE 30 MG: 15 INJECTION, SOLUTION INTRAMUSCULAR; INTRAVENOUS at 12:32

## 2022-11-16 RX ADMIN — ENOXAPARIN SODIUM 40 MG: 100 INJECTION SUBCUTANEOUS at 07:59

## 2022-11-16 RX ADMIN — METOPROLOL TARTRATE 25 MG: 25 TABLET ORAL at 20:48

## 2022-11-16 RX ADMIN — BUDESONIDE AND FORMOTEROL FUMARATE DIHYDRATE 2 PUFF: 160; 4.5 AEROSOL RESPIRATORY (INHALATION) at 07:56

## 2022-11-16 RX ADMIN — DESMOPRESSIN ACETATE 40 MG: 0.2 TABLET ORAL at 20:48

## 2022-11-16 RX ADMIN — ARIPIPRAZOLE 10 MG: 10 TABLET ORAL at 08:00

## 2022-11-16 RX ADMIN — GABAPENTIN 100 MG: 100 CAPSULE ORAL at 08:00

## 2022-11-16 ASSESSMENT — PAIN SCALES - GENERAL
PAINLEVEL_OUTOF10: 9
PAINLEVEL_OUTOF10: 10
PAINLEVEL_OUTOF10: 7

## 2022-11-16 ASSESSMENT — PAIN DESCRIPTION - DESCRIPTORS: DESCRIPTORS: ACHING

## 2022-11-16 ASSESSMENT — PAIN DESCRIPTION - LOCATION: LOCATION: ANKLE

## 2022-11-16 ASSESSMENT — PAIN DESCRIPTION - ORIENTATION: ORIENTATION: RIGHT

## 2022-11-16 NOTE — PLAN OF CARE
Problem: Discharge Planning  Goal: Discharge to home or other facility with appropriate resources  11/15/2022 2322 by Marianna Ford RN  Outcome: Progressing  11/15/2022 1601 by Ollie Javed RN  Outcome: Progressing     Problem: Pain  Goal: Verbalizes/displays adequate comfort level or baseline comfort level  11/15/2022 2322 by Marianna Ford RN  Outcome: Progressing  11/15/2022 1601 by Ollie Javed RN  Outcome: Progressing     Problem: Safety - Adult  Goal: Free from fall injury  11/15/2022 2322 by Marianna Ford RN  Outcome: Progressing  11/15/2022 1601 by Ollie Javed RN  Outcome: Progressing     Problem: ABCDS Injury Assessment  Goal: Absence of physical injury  11/15/2022 2322 by Marianna Ford RN  Outcome: Progressing  11/15/2022 1601 by Ollie Javed RN  Outcome: Progressing     Problem: Respiratory - Adult  Goal: Achieves optimal ventilation and oxygenation  11/15/2022 2322 by Marianna Ford RN  Outcome: Progressing  11/15/2022 1601 by Ollie Javed RN  Outcome: Progressing     Problem: Skin/Tissue Integrity  Goal: Absence of new skin breakdown  Description: 1. Monitor for areas of redness and/or skin breakdown  2. Assess vascular access sites hourly  3. Every 4-6 hours minimum:  Change oxygen saturation probe site  4. Every 4-6 hours:  If on nasal continuous positive airway pressure, respiratory therapy assess nares and determine need for appliance change or resting period.   11/15/2022 2322 by Marianna Ford RN  Outcome: Progressing  11/15/2022 1601 by Ollie Javed RN  Outcome: Progressing

## 2022-11-16 NOTE — PROGRESS NOTES
Physical Therapy  Facility/Department: [facility] CAR 2- STEPDOWN  Physical Therapy Daily Treatment Note    Name: Nanda Pollard  : 1950  MRN: [id number]  Date of Service: 2022    Discharge Recommendations:  Patient would benefit from continued therapy after discharge   PT Equipment Recommendations  Equipment Needed: No  Other: CTA pending progress, pt currently unable to participate with mobility and requires significant physical for bed mobility      Patient Diagnosis(es): The primary encounter diagnosis was Sepsis without acute organ dysfunction, [facility] Diagnoses of Monoarticular arthritis and Pseudogout of right ankle were also pertinent to this visit. Past Medical History:  has a past medical history of Abnormal EKG, Bladder tumor, CAD (coronary artery disease), Clinical trial participant at discharge, COPD (chronic obstructive pulmonary disease) ([facility] Depression, GERD (gastroesophageal reflux disease), Hearing loss, History of blood transfusion, Hyperlipidemia, Hypertension, Panic attacks, Prostate hypertrophy, Under care of team, and Wellness examination. Past Surgical History:  has a past surgical history that includes Tonsillectomy and adenoidectomy (child); Cystoscopy; lipoma resection (child); Cystoscopy (01/15/2016); Coronary artery bypass graft (2016); Cardiac defibrillator placement (2016); hernia repair (Right); bladder tumor excision (01/15/2016); Cystocopy (10/26/2022); Cystoscopy (N/A, 10/26/2022); and Coronary artery bypass graft (2016). Assessment   Body Structures, Functions, Activity Limitations Requiring Skilled Therapeutic Intervention: Decreased functional mobility ; Decreased strength;Decreased endurance;Decreased balance  Assessment: Pt required maxA x2 for bed mobility, pt was very confused and agitated.  Pt sat EOB ~15secs before requesting to return to supine, very high pain and very limited ability to participate d/t agitation and confusion. Pt would not be safe to return to prior living arrangement, will require continued PT to address deficits. Therapy Prognosis: Good  Requires PT Follow-Up: Yes  Activity Tolerance  Activity Tolerance: Patient limited by pain; Patient limited by endurance;Treatment limited secondary to decreased cognition;Treatment limited secondary to agitation     Plan   Physcial Therapy Plan  General Plan:  (5-6x/wk)  Current Treatment Recommendations: Strengthening, Balance training, Gait training, Functional mobility training, Stair training, Transfer training, Endurance training, Home exercise program, Safety education & training, Patient/Caregiver education & training, Therapeutic activities, Equipment evaluation, education, & procurement  Safety Devices  Type of Devices: Call light within reach, Nurse notified, Patient at risk for falls, All fall risk precautions in place, Left in bed, Bed alarm in place  Restraints  Restraints Initially in Place: No     Restrictions  Restrictions/Precautions  Restrictions/Precautions: Fall Risk  Required Braces or Orthoses?: No  Implants present? :  (AICD)  Required Braces or Orthoses  Left Lower Extremity Brace: Boot  LLE Brace Type: LLE \"ok for walking boot when up and moving\" per ortho note filed 11/13  Position Activity Restriction  Other position/activity restrictions: up with assist     Subjective   Pain: pt crying out in pain with minimal movements, does not rate but does admit to back pain and B LE pain  General  Chart Reviewed: Yes  Patient assessed for rehabilitation services?: Yes  Response To Previous Treatment: Patient with no complaints from previous session. Family / Caregiver Present: No  Follows Commands: Within Functional Limits  General Comment  Comments: Pt left in bed with call light within reach  Subjective  Subjective: RN agreeable to PT. Pt alert in bed, crying put occasionally and speaking to someone who was not present in room.  Pt initially agreeable to PT with encouragement, very confused with varying levels of participation. Cognition   Orientation  Overall Orientation Status: Impaired  Orientation Level: Oriented to person  Cognition  Overall Cognitive Status: Exceptions  Arousal/Alertness: Inconsistent responses to stimuli  Following Commands: Inconsistently follows commands  Attention Span: Difficulty attending to directions  Memory: Decreased recall of biographical Information;Decreased recall of recent events;Decreased short term memory;Decreased long term memory  Safety Judgement: Decreased awareness of need for safety  Problem Solving: Decreased awareness of errors  Insights: Decreased awareness of deficits  Initiation: Requires cues for all  Sequencing: Requires cues for all  Cognition Comment: waxing and waning mentation, pt unaware he is in the hospital. Reports he lives with his girlfriend, repeatedly referencing things/people who are not present. Perseverating on pain     Objective   SpO2: 98 %  O2 Device: None (Room air)     Observation/Palpation  Posture: Fair     Bed mobility  Supine to Sit: Maximum assistance;2 Person assistance  Sit to Supine: Maximum assistance;2 Person assistance  Scooting: Maximal assistance  Bed Mobility Comments: much discussion required to convince pt to attempt EOB, pt initially agreead then c/o high pain.  Very self limiting  Transfers  Sit to Stand: Unable to assess  Comment: Pt declined standing d/t back and BLE pain, tolerated sitting EOB ~15 secs before requesting to return to supine  Ambulation  Comments: KIM, pt declined attempt to stand/amb secondary to pain     Balance  Posture: Fair  Sitting - Static: Fair;-  Sitting - Dynamic: Poor;+  Comments: seated balance assessed EOB, pt demonstrating very decreased tolerance to sitting upright  Exercise Treatment: pt declined all ther exs, states pain increased with any movement or touching of pts LEs    AM-PAC Score  AM-PAC Inpatient Mobility Raw Score : 8 (11/16/22 1531)  AM-PAC Inpatient T-Scale Score : 28.52 (11/16/22 1531)  Mobility Inpatient CMS 0-100% Score: 86.62 (11/16/22 1531)  Mobility Inpatient CMS G-Code Modifier : CM (11/16/22 1531)    Goals  Short Term Goals  Time Frame for Short Term Goals: 14 visits  Short Term Goal 1: Pt will be SBA bed mobility  Short Term Goal 2: Pt will be CGA transfers  Short Term Goal 3: Pt will be CGA amb 150' RW  Short Term Goal 4: Pt will navigate 4 steps SBA  Patient Goals   Patient Goals : decrease pain, be able to mobilize       Education  Patient Education  Education Given To: Patient  Education Provided: Role of Therapy;Plan of Care  Education Method: Verbal  Barriers to Learning: None  Education Outcome: Continued education needed;Verbalized understanding;Demonstrated understanding      Therapy Time   Individual Concurrent Group Co-treatment   Time In 1155         Time Out 1216         Minutes 21         Timed Code Treatment Minutes: 1 [place], [place]

## 2022-11-16 NOTE — PROGRESS NOTES
Occupational Therapy  Facility/Department: [facility] CAR 2- STEPDOWN  Occupational Therapy Daily Treatment Note    Name: Jodee Grover  : 1950  MRN: [id number]  Date of Service: 2022    Discharge Recommendations:  Patient would benefit from continued therapy after discharge      Patient Diagnosis(es): The primary encounter diagnosis was Sepsis without acute organ dysfunction, due to unspecified organism [facility]). Diagnoses of Monoarticular arthritis and Pseudogout of right ankle were also pertinent to this visit. Past Medical History:  has a past medical history of Abnormal EKG, Bladder tumor, CAD (coronary artery disease), Clinical trial participant at discharge, COPD (chronic obstructive pulmonary disease) ([facility] Depression, GERD (gastroesophageal reflux disease), Hearing loss, History of blood transfusion, Hyperlipidemia, Hypertension, Panic attacks, Prostate hypertrophy, Under care of team, and Wellness examination. Past Surgical History:  has a past surgical history that includes Tonsillectomy and adenoidectomy (child); Cystoscopy; lipoma resection (child); Cystoscopy (01/15/2016); Coronary artery bypass graft (2016); Cardiac defibrillator placement (2016); hernia repair (Right); bladder tumor excision (01/15/2016); Cystocopy (10/26/2022); Cystoscopy (N/A, 10/26/2022); and Coronary artery bypass graft (2016). Assessment   Performance deficits / Impairments: Decreased functional mobility ; Decreased ADL status; Decreased endurance;Decreased balance;Decreased high-level IADLs  Assessment: pt would benefit from further therapy at discharge in order to increase safety and independence. pt significant back pain is impacting ability to complete ADLS and functional transfer/smobility.   Prognosis: Good  Activity Tolerance  Activity Tolerance: Patient limited by pain        Plan   Occupational Therapy Plan  Times Per Week: 3-4x/wk Restrictions  Restrictions/Precautions  Restrictions/Precautions: Fall Risk, General Precautions  Required Braces or Orthoses?: No  Implants present? :  (AICD)  Required Braces or Orthoses  Left Lower Extremity Brace: Boot  LLE Brace Type: LLE \"ok for walking boot when up and moving\" per ortho note filed 11/13  Position Activity Restriction  Other position/activity restrictions: up with assist    Subjective   General  Patient assessed for rehabilitation services?: Yes  Response to previous treatment: Patient with no complaints from previous session  Family / Caregiver Present: No  Diagnosis: L septic arthritis vs crystal arthropathy vs gout, BLE weakness, hx of CABG, 2 bladder mass removals  Subjective  General Comment  Comments: RN ok'd patient for OT treatment this morning. Pt is supine in bed upon ELI/PTA arrival. Pt is resistive to session, confused, hallucinating/delirium present. Pt reports and perseverated on pain. RN aware  pt crying out in pain with minimal movements, does not rate but does admit to back pain and B LE pain      Objective   Observation/Palpation  Posture: Fair  Safety Devices  Type of Devices: Call light within reach;Nurse notified; Patient at risk for falls; All fall risk precautions in place; Left in bed;Bed alarm in place  Restraints  Restraints Initially in Place: No  Balance  Sitting: With support (~15-20 seconds prior to patient requesting to return to supine)  Transfer Training  Transfer Training:  (Pt would not attempt or agree with standing or transfers)        ADL  Additional Comments: ELI even attempted to don slipper socks and patient yelled out in pain the minute he was touched. Therefore, declined all other ADL tasks. Pt talking and yelling at his dog (not present) \"to get down\" Pt unable to stay on one thought and talking about other things and places which make no sense     Activity Tolerance  Activity Tolerance: Patient limited by pain; Patient limited by endurance;Treatment limited secondary to decreased cognition;Treatment limited secondary to agitation  Bed mobility  Supine to Sit: Maximum assistance;2 Person assistance  Sit to Supine: Maximum assistance;2 Person assistance  Scooting: Maximal assistance  Bed Mobility Comments: much discussion required to convince pt to attempt EOB, pt initially agreead then c/o high pain. Very self limiting        Cognition  Overall Cognitive Status: Exceptions  Arousal/Alertness: Inconsistent responses to stimuli  Following Commands: Inconsistently follows commands  Attention Span: Difficulty attending to directions  Memory: Decreased recall of biographical Information;Decreased recall of recent events;Decreased short term memory;Decreased long term memory  Safety Judgement: Decreased awareness of need for safety  Problem Solving: Decreased awareness of errors  Insights: Decreased awareness of deficits  Initiation: Requires cues for all  Sequencing: Requires cues for all  Cognition Comment: waxing and waning mentation, pt unaware he is in the hospital. Reports he lives with his girlfriend, repeatedly referencing things/people who are not present.  Perseverating on pain  Orientation  Overall Orientation Status: Impaired  Orientation Level: Oriented to person     Education Given To: Patient  Education Provided: Role of Therapy;Transfer Training  Education Provided Comments: breathing out during transitions, log roll tech, importance of mobility to decrease pain and increase function  Education Method: Verbal  Barriers to Learning: Cognition  Education Outcome: Continued education needed    AM-PAC Score  AM-PAC Inpatient Daily Activity Raw Score: 15 (11/16/22 1553)  AM-PAC Inpatient ADL T-Scale Score : 34.69 (11/16/22 1553)  ADL Inpatient CMS 0-100% Score: 56.46 (11/16/22 1553)  ADL Inpatient CMS G-Code Modifier : CK (11/16/22 1553)      Goals  Short Term Goals  Time Frame for Short Term Goals: Pt will by discharge  Short Term Goal 1: demo sitting on EOB unsupported for 5 min+ at mod A during func activity  Short Term Goal 2: demo mod Ax1 for all bed mobility using bed rails PRN  Short Term Goal 3: identify 2 non-pharmacological pain-relieving techniques with 1 cue  Short Term Goal 4: demo ADL UB bathing/dressing activity with increased time and SBA  Short Term Goal 5: demo ADL LB bathing/dressing activity with increased time, AE PRN and mod A  Short Term Goal 6: notify OTR to update goals as pt's mobility increases, etc.       Therapy Time   Individual Concurrent Group Co-treatment   Time In 1155         Time Out 1216         Minutes 21         Timed Code Treatment Minutes: 15 Minutes (Co-tx with PTA for safety and goal progression)       SREEDHAR [place]/TANI

## 2022-11-16 NOTE — PROGRESS NOTES
Neurosurgery [facility]/Resident    Daily Progress Note   Chief Complaint   Patient presents with    Ankle Pain     11/16/2022  4:09 PM    Chart reviewed. No acute events overnight. No new complaints. Vitals:    11/16/22 0629 11/16/22 0728 11/16/22 1111 11/16/22 1500   BP:  135/86 115/71 121/84   Pulse:  98 81 91   Resp: 19 20 16 19   Temp:  98 °F (36.7 °C) 98.4 °F (36.9 °C) 98.4 °F (36.9 °C)   TempSrc:  Oral Oral Oral   SpO2:  92% 95% 98%   Weight:       Height:             PE:   Alert, oriented to self  Follows commands  Motor   L deltoid 5/5; R deltoid 5/5  L biceps 5/5; R biceps 5/5  L triceps 5/5; R triceps 5/5  L intrinsics 5/5; R intrinsics 5/5      Limited exam bilat LE due to pain with movement  Diffusely 4/5 bilat LE       Lab Results   Component Value Date    WBC 12.4 (H) 11/16/2022    HGB 11.1 (L) 11/16/2022    HCT 34.4 (L) 11/16/2022     (H) 11/16/2022    CHOL 179 12/01/2017    TRIG 173 12/01/2017    HDL 47 12/01/2017    ALT <5 (L) 11/12/2022    AST 11 11/12/2022     11/16/2022    K 3.4 (L) 11/16/2022    CL 99 11/16/2022    CREATININE 0.80 11/16/2022    BUN 25 (H) 11/16/2022    CO2 26 11/16/2022    TSH 0.36 11/11/2022    PSA 0.88 11/12/2022    INR 1.2 11/12/2022    LABA1C 5.8 12/01/2017    .1 (H) 11/15/2022    SEDRATE 117 (H) 11/11/2022       Radiology   CT LUMBAR SPINE W CONTRAST    Result Date: 11/15/2022  EXAMINATION: CT OF THE LUMBAR SPINE WITH CONTRAST  11/15/2022 1:38 pm TECHNIQUE: CT of the lumbar spine was performed with the administration of intravenous contrast. Multiplanar reformatted images are provided for review. Automated exposure control, iterative reconstruction, and/or weight based adjustment of the mA/kV was utilized to reduce the radiation dose to as low as reasonably achievable.  COMPARISON: None HISTORY: ORDERING SYSTEM PROVIDED HISTORY: obtain after myelogram for lumbar stenosis TECHNOLOGIST PROVIDED HISTORY: obtain after myelogram for lumbar stenosis Reason for Exam: obtain after myelogram for lumbar stenosis FINDINGS: BONES/ALIGNMENT:  There is levocurvature of the lumbar spine. The vertebral body heights are maintained. There is no spondylolisthesis. SOFT TISSUES: The posterior paraspinal soft tissues are unremarkable. The visualized abdominal structures are unremarkable. There is uncomplicated colonic diverticulosis. Conus is normal in caliber and terminates at the L2 level. The cauda equina is unremarkable. L1-L2: There is a circumferential disc bulge with facet hypertrophy. There is no canal stenosis. There is mild right and moderate left foraminal narrowing. L2-L3: There is a circumferential disc bulge with facet hypertrophy. There is canal stenosis measuring 7 mm in AP dimension. There is moderate bilateral foraminal narrowing. L3-L4: There is a circumferential disc bulge with facet hypertrophy. There is canal stenosis measuring 9 mm in AP dimension. There is severe bilateral foraminal narrowing. L4-L5: There is a circumferential disc bulge with facet and ligamentous hypertrophy. There is canal stenosis measuring 8 mm in AP dimension. There is severe bilateral foraminal narrowing. L5-S1: There is a circumferential disc bulge. There is no canal stenosis. There is severe bilateral foraminal narrowing. Multilevel degenerative disc disease with associated multilevel degenerative facet hypertrophy resulting in canal stenosis most severe at L2-3. Bilateral foraminal narrowing as described above. CT LUMBAR SPINE W CONTRAST    Result Date: 11/14/2022  EXAMINATION: CT OF THE LUMBAR SPINE WITH CONTRAST  11/14/2022 11:58 am TECHNIQUE: CT of the lumbar spine was performed with the administration of intravenous contrast. Multiplanar reformatted images are provided for review. Automated exposure control, iterative reconstruction, and/or weight based adjustment of the mA/kV was utilized to reduce the radiation dose to as low as reasonably achievable.  COMPARISON: Lumbar spine radiographs 11/08/2008 HISTORY: ORDERING SYSTEM PROVIDED HISTORY: concern for slipped disc as well as possible mass/psoas abbcess or lytic/blasic lesions suggesting underlying maligancy contributing to possible pathological fracture. TECHNOLOGIST PROVIDED HISTORY: concern for slipped disc as well as possible mass/psoas abbcess or lytic/blasic lesions suggesting underlying maligancy contributing to possible pathological fracture. FINDINGS: BONES/ALIGNMENT:  There is a levoconvex lumbar scoliosis. There is multilevel disc space narrowing and degenerative endplate sclerosis throughout the lower thoracic and lumbar spine. There is no spondylolysis. No lytic or blastic osseous lesions are identified. SOFT TISSUES: There is no paraspinal mass identified. There is no abnormal fluid collection identified. Severe atherosclerotic calcifications are present within the abdominal aorta. L1-L2: There is a disc bulge and osteophyte formation. There is bilateral facet arthropathy. There is severe left neural foraminal narrowing. No significant spinal canal stenosis or right neural foraminal narrowing is present. L2-L3: There is a disc bulge, facet arthropathy and thickening of the ligamentum flavum. There is severe left neural foraminal narrowing. There is no significant right neural foraminal narrowing. Mild spinal canal stenosis is present. L3-L4: There is a disc bulge, facet arthropathy and thickening of the ligamentum flavum. , severe right and moderate left neural foraminal narrowing. There is moderate spinal canal stenosis. L4-L5: There is a disc bulge, facet arthropathy and thickening of the ligamentum flavum. There is severe spinal canal stenosis, severe right and mild left neural foraminal narrowing. L5-S1: There is a disc bulge, posterior osteophyte formation and facet arthropathy. There is no significant spinal canal stenosis. Severe bilateral neural foraminal narrowing is present.      1. No findings to suggest acute discitis/osteomyelitis. 2. No abscess identified. 3. Severe multilevel degenerative changes of the lumbar spine, as described above, but most pronounced at L4-5 where there is severe spinal canal stenosis, severe right and mild left neural foraminal narrowing. IR MYELOGRAM LUMBOSACRAL    Result Date: 11/15/2022  EXAMINATION: FLUOROSCOPIC LUMBAR MYELOGRAM 11/15/2022: HISTORY: ORDERING SYSTEM PROVIDED HISTORY: lumbar stenosis L4-5 TECHNOLOGIST PROVIDED HISTORY: lumbar stenosis L4-5 FLUOROSCOPY DOSE AND TYPE OR TIME AND EXPOSURES: Fluoro time 1.4 minutes DAP 1817.61 uGy cm 2 PROCEDURE: : Yamileth Hobson This procedure was performed by Quita Feilpe PA-C under direct supervision of Dr. Deidra Vasquez. The patient was placed prone on the fluoroscopic table. The lower back was prepped and draped in usual sterile fashion. All elements of maximal sterile barrier technique, including cap, mask, sterile gown, sterile gloves, a large sterile sheet, hand hygiene and 2% chlorhexidine for cutaneous antisepsis were followed. 1% lidocaine was used as local anesthetic. Under fluoroscopic guidance a 20-gauge spinal needle was advanced to the thecal sac at the L3-L4 level. Upon establishing CSF fluid return, approximately 8 mL of Isovue-M 200 was intrathecally administered. Needle was then removed and dressing was applied. Estimated blood loss was less than 1 mL. Conventional field myelography was performed in standard projections. The patient tolerated the procedure well and left the department in stable condition. Successful fluoroscopic lumbar myelogram.       A/P  70 y.o. male who presents with multilevel degenerative changes with stenosis      - recommend L2-4 laminectomy if patient agreeable  - plavix on hold   - f/u cardiology clearance/optimization    Please contact neurosurgery with any changes in patients neurologic status.        Liane Muniz CNP  11/16/22  4:09 PM

## 2022-11-16 NOTE — PROGRESS NOTES
Pt screaming in pain, not wanting to be touched. Unable to turn patient from side to side. I explained the importance of turning from side to side to decrease the risk of a bed sore and skin breakdown. Pt verbalized understanding but explain he is in too much pain. Writer provider pain medication and will re evaluate the situation later.

## 2022-11-16 NOTE — PROGRESS NOTES
[facility]  Office: [address], DO, Karrie Sep, DO, Blaine Heart, DO, Luis Stiles Blood, DO, Darline Begum MD, Lalitha Atkinson MD, Manpreet Vazquez MD, Shawanda Lester MD,  Aram Singletary MD, Jame Klein MD, Mandy Walker, DO, Vj Henderson MD,  Zaira Ramirez MD, Stewart Summers MD, Bing Boxer, DO, Norm Quiñonez MD, Shanelle Infante MD, Enrique Diaz, DO, Cristo Frye MD, Kerrie Villavicencio MD, Jesus Liu MD, Johnathan Zavalar, MD, Steve Morrison DO, Ronald Chahal MD, Luis Kelley MD, Philly Hendrickson, CNP,  Lola Bernardo, CNP, Paula Finn, CNP, Radha Redmond, CNP,  Rinku Pabon, [facility], Willie Mendez, CNP, Shona Shannon, CNP, Darron Looney, CNP, Angel Carranza, CNP, David Escobar, CNP, Adeline , PA-C, Harrison Dawkins, CNS, Kaiser Dan, DNP, Margarita Salcido, CNP, [place], [facility], Paul Massey, CNP         Jerardo Rojas [address]    Progress Note    11/16/2022    11:57 AM    Name:   Brenna Chau  MRN:     [id number]     Acct:      [de-identified]   Room:   2005/2005-01  IP Day:  5  Admit Date:  11/11/2022  3:49 PM    PCP:   Mallory Daly MD  Code Status:  Full Code    Subjective:     C/C:   Chief Complaint   Patient presents with    Ankle Pain     Interval History Status: not changed. Pt is more confused, altered today - Aox2  Monday more oriented, less Tuesday, and worse today  Will DC opioids  His pharmacotherapy has been collated based on home meds  Try only toradol for pain see if helps    Brief History: This is a 70year old male who presents to our hospital with complaints of left foot pain. CT of his left foot showed no acute osseous abnormality and normal midfoot alignment but did show subcutaneous edema without any drainable  fluid collection or soft tissue gas. Pt was febrile with Tmax 100.8, with WBC 17 and left shift. There was concern for septic arthritis.  Orthopedic surgery was consulted, he has aspiration of joint which showed white count 11,937, 96% neutrophil count. Gram stain/culture and crystals were ordered and pending: He was seen by Infectious disease and started him on vancomycin. A CT with contrast of his lumbar spine was done due to leg weakness and concern for possible abscess which showed no evidence of abscess    He has also had subsequent imaging from neurosurgery including IR myelogram and has severe canal stenosis but does not appear to be candidate for surgical intervention    Canal stenosis most severe @ L2-L3  Severe bilateral foraminal narrowing    Review of Systems:     Constitutional:  negative for chills, fevers, sweats  Respiratory:  negative for cough, dyspnea on exertion, shortness of breath, wheezing  Cardiovascular:  negative for chest pain, chest pressure/discomfort, lower extremity edema, palpitations  Gastrointestinal:  negative for abdominal pain, constipation, diarrhea, nausea, vomiting  Neurological:  negative for dizziness, headache, he is complaining of bilateral lower extremity leg weakness no urinary tension  EXT: admits to severe pain in left foot making it difficult to ambulate extremity     Medications:      Allergies:  No Known Allergies    Current Meds:   Scheduled Meds:    colchicine  1.2 mg Oral Once    colchicine  0.6 mg Oral BID    polyethylene glycol  17 g Oral Daily    atorvastatin  40 mg Oral Nightly    buPROPion  450 mg Oral QAM    [Held by provider] clopidogrel  75 mg Oral Daily    fenofibrate  54 mg Oral Daily    budesonide-formoterol  2 puff Inhalation BID    furosemide  40 mg Oral Daily    lisinopril  2.5 mg Oral Daily    metoprolol tartrate  25 mg Oral BID    traZODone  150 mg Oral Nightly    vilazodone HCl  20 mg Oral Daily    sodium chloride flush  5-40 mL IntraVENous 2 times per day    enoxaparin  40 mg SubCUTAneous Daily    gabapentin  100 mg Oral TID    sennosides-docusate sodium  2 tablet Oral Daily    ARIPiprazole  10 mg Oral Daily     Continuous Infusions:    sodium chloride 25 mL/hr at 22 2393     PRN Meds: ketorolac, potassium chloride **OR** potassium alternative oral replacement **OR** potassium chloride, magnesium sulfate, hydrOXYzine HCl, sodium chloride flush, sodium chloride, acetaminophen **OR** acetaminophen, albuterol, oxyCODONE    Data:     Past Medical History:   has a past medical history of Abnormal EKG, Bladder tumor, CAD (coronary artery disease), Clinical trial participant at discharge, COPD (chronic obstructive pulmonary disease) ([address] Depression, GERD (gastroesophageal reflux disease), Hearing loss, History of blood transfusion, Hyperlipidemia, Hypertension, Panic attacks, Prostate hypertrophy, Under care of team, and Wellness examination. Social History:   reports that he quit smoking about a year ago. His smoking use included cigars and cigarettes. He started smoking about 56 years ago. He smoked an average of .5 packs per day. He quit smokeless tobacco use about 6 years ago. His smokeless tobacco use included snuff and chew. He reports current alcohol use of about 7.0 standard drinks per week. He reports current drug use. Drug: Marijuana Shellye Burkitt). Family History:   Family History   Problem Relation Age of Onset    Other Mother         blind    Heart Surgery Mother     Cancer Father         leukemia       Vitals:  /71   Pulse 81   Temp 98.4 °F (36.9 °C) (Oral)   Resp 16   Ht 5' 9\" (1.753 m)   Wt 199 lb 15.3 oz (90.7 kg)   SpO2 95%   BMI 29.53 kg/m²   Temp (24hrs), Av.1 °F (36.7 °C), Min:97.7 °F (36.5 °C), Max:98.4 °F (36.9 °C)    Recent Labs     22  2110   POCGLU 90       I/O (24Hr):     Intake/Output Summary (Last 24 hours) at 2022 1157  Last data filed at 11/15/2022 2300  Gross per 24 hour   Intake --   Output 350 ml   Net -350 ml       Labs:  Hematology:  Recent Labs     11/15/22  0307 22  0624   WBC  --  12.4*   RBC  --  4.65   HGB  --  11.1*   HCT  --  34.4* MCV  --  74.0*   MCH  --  23.9*   MCHC  --  32.3   RDW  --  17.2*   PLT  --  513*   MPV  --  10.5   .1*  --      Chemistry:  Recent Labs     11/14/22  0527 11/16/22  0624   NA  --  137   K 4.0 3.4*   CL  --  99   CO2  --  26   GLUCOSE  --  92   BUN  --  25*   CREATININE  --  0.80   MG 1.9 2.1   ANIONGAP  --  12   LABGLOM  --  >60   CALCIUM  --  9.3     Recent Labs     11/14/22  2110   POCGLU 90     ABG:  Lab Results   Component Value Date/Time    POCPH 7.41 03/28/2016 05:31 AM    POCPCO2 37 03/28/2016 05:31 AM    POCPO2 105 03/28/2016 05:31 AM    POCHCO3 23.3 03/28/2016 05:31 AM    NBEA 1 03/28/2016 05:31 AM    PBEA NOT REPORTED 03/28/2016 05:31 AM    CRC2KJK 24 03/28/2016 05:31 AM    GAVX3LNC 98 03/28/2016 05:31 AM    FIO2 40.0 03/28/2016 05:31 AM     Lab Results   Component Value Date/Time    SPECIAL LEFT AC 3ML 11/12/2022 02:19 AM    SPECIAL LEFT FOREARM 2ML 11/12/2022 02:19 AM     Lab Results   Component Value Date/Time    CULTURE NO GROWTH 4 DAYS 11/12/2022 08:54 AM       Radiology:  XR ANKLE LEFT (MIN 3 VIEWS)    Result Date: 11/12/2022  No acute osseous or soft tissue abnormality. XR FOOT LEFT (MIN 3 VIEWS)    Result Date: 11/12/2022  Soft tissue swelling without acute osseous abnormality. XR CHEST PORTABLE    Result Date: 11/11/2022  No acute cardiopulmonary findings. CT FOOT LEFT WO CONTRAST    Result Date: 11/11/2022  1. No acute osseous abnormality. Normal midfoot alignment. 2. Nonspecific subcutaneous edema. No drainable fluid collection or soft tissue gas. Physical Examination:        General appearance:  alert, cooperative and no distress  Mental Status:  oriented to self  Lungs:  clear to auscultation bilaterally, normal effort  Heart:  regular rate and rhythm, no murmur  Abdomen:  soft, nontender, nondistended, normal bowel sounds, no masses, hepatomegaly, splenomegaly  Extremities:  no edema, redness, tenderness in the calves.  There is  pain with palpation of left leg, he is restricted with active and passive movement. Muscle strength 4 out of 5 bilaterally  Skin:  no gross lesions, rashes, induration there is some erythema noted on left leg    Assessment:        Hospital Problems             Last Modified POA    * (Principal) Pseudogout of ankle 11/14/2022 Yes    Sepsis ([address] 11/12/2022 Yes    Acute left ankle pain 11/12/2022 Yes    Left leg weakness 11/12/2022 Yes    Primary hypertension 11/12/2022 Yes    Ischemic cardiomyopathy 11/12/2022 Yes    Microcytosis 11/12/2022 Yes    Class 1 obesity due to excess calories with serious comorbidity and body mass index (BMI) of 31.0 to 31.9 in adult 11/12/2022 Yes    COPD (chronic obstructive pulmonary disease) ([address] 11/12/2022 Yes    Monoarticular arthritis 11/12/2022 Yes    Fall 11/12/2022 Yes    SIRS (systemic inflammatory response syndrome) ([address] 11/14/2022 Yes    Hyperlipidemia 11/12/2022 Yes    S/P cardiac cath- Multivessel CAD 3/24/16-Dr. Maureen Bang 11/12/2022 Yes    Acute hypokalemia 11/12/2022 Yes    S/P CABG x 4 11/12/2022 Yes      Plan:        Ankle pain due to Pseudogout:  Body fluid positive for Calcium pyrophosphate crystals. Start Colchicine. Outpatient orthopedic surgery evaluation for possible steroid injection. Stop abx THERE IS NO EVIDENCE OF SEPSIS  Altered mentation today likely toxic metabolic encephalopathy with underlying psychosis: Patient is on multiple psychotropic agents, he has been getting Roxicodone 5 times a day, as well as Atarax  Bilateral leg weakness: Myelogram is pending will likely need outpatient follow-up  Microcytosis without anemia: Tsat: 7%. continue iron supplementation. BPH: No retention. History of ischemic cardiomyopathy with EF of 20 to 25% on echo from 2016: Currently appears compensated. He has AICD in place. Continue  home lasix, lisinopril and BB. History of coronary artery disease s/p CABG x4: Hold plavix incase pt needs surgical intervention continue lipitor. COPD: Stable.  No exacerbation  Primary HTN: stable.    Toxic thyroid nodule has follow-up at [address] fenofibrate - may be contributing to LE pain in setting of concomitant statin use  DC oxycodone  DC atarax  Monitor today see if improvement - reorient as able    Dispo: Plan for d/c pending final neurosurgery evaluation    Herb López MD  11/16/2022  11:57 AM

## 2022-11-16 NOTE — PROGRESS NOTES
Patient intermittently screaming throughout the night as a result of his leg pain. Patient believes someone is in his room and \"torturing\" him. Patient is a/o x2, very delusional, and requires frequent reorientation. Writer continued to give PRN pain medication every 4 hours. Patient became increasingly restless around 0500. Writer notified NP of overnight events and explained that the patients pain seems to be unrelieved. Writer also explained to NP that labs have not been drawn in a few days and that patients urine is very dark in color. Labs and UA were ordered by NP and writer collected/sent a urine sample.

## 2022-11-16 NOTE — PROGRESS NOTES
[facility] - Progress Note  Today's Date and Time: 11/16/2022, 11:50 AM    Impression :   Left ankle effusion and pain after rolling his ankle during a fall   Low likelihood of septic arthritis  Positive crystal arthropathy  CAD s/p CABG  COPD  HFrEF, EF (2016): 20-25 %, s/p AICD placement 2016  HTN  Chronic back pain  H/o bladder masses (benign)    Recommendations:   Monitor off antibiotics:  Discontinued Vancomycin 11/14/22  D/C  Zosyn  Further recommendations pending culture data  No growth  Bursa fluid positive for calcium pyrophosphate crystals  Ankle pain appears to be related to pseudogout  CT lumbar spine for lower extremity weakness-Showing severe lumbar stenosis  Neurosurgery consulted  CT myelogram completed 11/15/22    Medical Decision Making/Summary/Discussion:11/16/2022       Infection Control Recommendations   [place] Precautions    Antimicrobial Stewardship Recommendations     Discontinuation of therapy    Coordination of Outpatient Care:   Estimated Length of IV antimicrobials:TBD  Patient will need Midline Catheter Insertion: TBD  Patient will need PICC line Insertion:BD  Patient will need: Home IV , [place],  SNF,  LTAC: TBD  Patient will need outpatient wound care:No    Chief complaint/reason for consultation:   Concern for septic arthritis      History of Present Illness:   Paco Rojas is a 70y.o.-year-old male who was initially admitted on 11/11/2022. Patient seen at the request of Dr. Benites Fraction:    Patient presents to the hospital with left ankle pain, and bilateral leg weakness. Patient reports that his symptoms started 2 to 3 days ago when he fell down and rolled his ankle, he went to [facility] where the x-ray did not reveal any fractures but there was concern for possible infection thus he was managed with doxycycline twice daily.   He was discharged and he came back to [place] [address] ER 2 days later with the unresolved left ankle pain, bilateral leg weakness. Past medical history is significant for CAD s/p CABG, COPD, benign bladder tumors, BPH and chronic back pain. He also had a bladder cystoscopy 1 week ago for removal of bladder tumor. He has had 2 bladder masses removed previously which were negative for any malignancy    He denies any fever, nausea, vomiting, chest pain, shortness of breath or any abdominal pain, and dysuria. On evaluation patient is alert and oriented, in no apparent distress. He is afebrile, hemodynamically stable, saturating well on room air. He is complaining of neck pain and back pain but reports that the ankle pain is much improved. XR ANKLE LEFT (MIN 3 VIEWS)   Final Result   No acute osseous or soft tissue abnormality. XR FOOT LEFT (MIN 3 VIEWS)   Final Result   Soft tissue swelling without acute osseous abnormality. XR CHEST PORTABLE   Final Result   No acute cardiopulmonary findings. CT FOOT LEFT WO CONTRAST   Final Result   1. No acute osseous abnormality. Normal midfoot alignment. 2. Nonspecific subcutaneous edema. No drainable fluid collection or soft   tissue gas. CURRENT EVALUATION 11/16/2022  /71   Pulse 81   Temp 98.4 °F (36.9 °C) (Oral)   Resp 16   Ht 5' 9\" (1.753 m)   Wt 199 lb 15.3 oz (90.7 kg)   SpO2 95%   BMI 29.53 kg/m²     Afebrile  VS stable    The patient is on room air today and is experiencing intermittent confusion. He is still complaining of severe back pain to the extent that he cannot bend his legs. He has full sensation in his lower extremities and does not appear to be retaining urine. He underwent a CT of the lumbar spine that revealed severe L4-L5 lumbar stenosis. No sign of osteomyelitis. Neurosurgery has been consulted.    CT myelogram completed 11/15/22    S/p left ankle aspiration 11-12-22  WBC <11,937, many neutrophils and no bacteria seen on direct exam  Moderate calcium pyrophosphate crystals noted  Vancomycin discontinued as this appears to be an acute pseudogout attack    CRP is elevated at 402.1    No other acute issues noted    Labs, X rays reviewed: 11/16/2022    BUN: 16  Cr: 0.89    WBC: 17.7-->12.4  Hb: 13.3-->11.1  Plat:  467-->513    CRP: 391.1-->372.7-->402.1      Cultures:  Urine:    Blood:  11-11-22: No growth  11-12-22: No growth  Sputum :    Left ankle bursa aspirate:  11/12/22: No bacteria seen  MRSA Nares:  11/12/22: Not detected    Imaging:     CXR 11-11-22      Left ankle 11-11-22      Discussed with patient, RN, family. I have personally reviewed the past medical history, past surgical history, medications, social history, and family history, and I have updated the database accordingly. Past Medical History:     Past Medical History:   Diagnosis Date    Abnormal EKG     anterior fascicular block    Bladder tumor     CAD (coronary artery disease)     Clinical trial participant at discharge 03/24/2016    disqualified 3/24/2016    COPD (chronic obstructive pulmonary disease) ([facility] [address]     Depression     GERD (gastroesophageal reflux disease)     Hearing loss     History of blood transfusion     Hyperlipidemia     Hypertension 2012    Panic attacks     Prostate hypertrophy     Under care of team     Dr. Rafat Tenorio, cardiology    Wellness examination     -PCP seen in Dec. 2019       Past Surgical  History:     Past Surgical History:   Procedure Laterality Date    BLADDER TUMOR EXCISION  01/15/2016    TURB with gyrus    CARDIAC DEFIBRILLATOR PLACEMENT  09/22/2016    Unsafe Pacemaker. [place] 9881 ICD Model # [id number].  RV Lead [employer] [place] SG Model # [id number]    CORONARY ARTERY BYPASS GRAFT  03/27/2016    X 4 Emergent    CORONARY ARTERY BYPASS GRAFT  04/01/2016    CYSTOSCOPY      CYSTOSCOPY  01/15/2016    CYSTOSCOPY  10/26/2022    CYSTOSCOPY BLADDER BIOPSY, FULGARATION    CYSTOSCOPY N/A 10/26/2022    CYSTOSCOPY BLADDER BIOPSY, FULGARATION performed by Cookie Bermudez MD at [facility] [address]     LIPOMA RESECTION  child    anterior neck    TONSILLECTOMY AND ADENOIDECTOMY  child       Medications:      colchicine  1.2 mg Oral Once    colchicine  0.6 mg Oral BID    polyethylene glycol  17 g Oral Daily    atorvastatin  40 mg Oral Nightly    buPROPion  450 mg Oral QAM    [Held by provider] clopidogrel  75 mg Oral Daily    fenofibrate  54 mg Oral Daily    budesonide-formoterol  2 puff Inhalation BID    furosemide  40 mg Oral Daily    lisinopril  2.5 mg Oral Daily    metoprolol tartrate  25 mg Oral BID    traZODone  150 mg Oral Nightly    vilazodone HCl  20 mg Oral Daily    sodium chloride flush  5-40 mL IntraVENous 2 times per day    enoxaparin  40 mg SubCUTAneous Daily    gabapentin  100 mg Oral TID    sennosides-docusate sodium  2 tablet Oral Daily    ARIPiprazole  10 mg Oral Daily       Social History:     Social History     Socioeconomic History    Marital status:      Spouse name: Not on file    Number of children: 1    Years of education: Not on file    Highest education level: Not on file   Occupational History    Occupation: [employer] man   Tobacco Use    Smoking status: Former     Packs/day: 0.50     Types: Cigars, Cigarettes     Start date: 1966     Quit date: 2021     Years since quittin.0    Smokeless tobacco: Former     Types: Snuff, Chew     Quit date: 3/23/2016   Vaping Use    Vaping Use: Never used   Substance and Sexual Activity    Alcohol use:  Yes     Alcohol/week: 7.0 standard drinks     Types: 7 Cans of beer per week     Comment: occasionally    Drug use: Yes     Types: Marijuana Umm Cotton)    Sexual activity: Not on file   Other Topics Concern    Not on file   Social History Narrative    Not on file     Social Determinants of Health     Financial Resource Strain: Low Risk     Difficulty of Paying Living Expenses: Not hard at all   Food Insecurity: No Food Insecurity    Worried About [address] in the Last Year: Never true    Ran Out of Food in the Last Year: Never true   Transportation Needs: No Transportation Needs    Lack of Transportation (Medical): No    Lack of Transportation (Non-Medical): No   Physical Activity: Insufficiently Active    Days of Exercise per Week: 3 days    Minutes of Exercise per Session: 30 min   Stress: Stress Concern Present    Feeling of Stress : Very much   Social Connections: Socially Isolated    Frequency of Communication with Friends and Family: Never    Frequency of Social Gatherings with Friends and Family: More than three times a week    Attends Gnosticist Services: Never    Active Member of Clubs or Organizations: No    Attends Club or Organization Meetings: Never    Marital Status:    Intimate Partner Violence: Not At Risk    Fear of Current or Ex-Partner: No    Emotionally Abused: No    Physically Abused: No    Sexually Abused: No   Housing Stability: Unknown    Unable to Pay for Housing in the Last Year: No    Number of Places Lived in the Last Year: Not on file    Unstable Housing in the Last Year: No       Family History:     Family History   Problem Relation Age of Onset    Other Mother         blind    Heart Surgery Mother     Cancer Father         leukemia        Allergies:   Patient has no known allergies. Review of Systems:   Constitutional: No fevers or chills. No systemic complaints  Head: No headaches  Eyes: No double vision or blurry vision. No conjunctival inflammation. ENT: No sore throat or runny nose. . No hearing loss, tinnitus or vertigo. Cardiovascular: No chest pain or palpitations. No shortness of breath. No TALBOT  Lung: No shortness of breath or cough. No sputum production  Abdomen: No nausea, vomiting, diarrhea, or abdominal pain. [place] No cramps. Genitourinary: No increased urinary frequency, or dysuria. No hematuria.  No suprapubic or CVA pain  Musculoskeletal: left ankle pain, bilateral lower extremity pain and weakness  Neurologic: No headache, weakness, numbness, or tingling. Integument: No rash, no ulcers. Psychiatric: No depression. Endocrine: No polyuria, no polydipsia, no polyphagia. Physical Examination :   Patient Vitals for the past 8 hrs:   BP Temp Temp src Pulse Resp SpO2 Weight   11/16/22 1111 115/71 98.4 °F (36.9 °C) Oral 81 16 95 % --   11/16/22 0728 135/86 98 °F (36.7 °C) Oral 98 20 92 % --   11/16/22 0629 -- -- -- -- 19 -- --   11/16/22 0413 -- -- -- -- -- -- 199 lb 15.3 oz (90.7 kg)     General Appearance: Awake, alert, and in no apparent distress  Head:  Normocephalic, no trauma  Eyes: Pupils equal, round, reactive to light and accommodation; extraocular movements intact; sclera anicteric; conjunctivae pink. No embolic phenomena. ENT: Oropharynx clear, without erythema, exudate, or thrush. No tenderness of sinuses. Mouth/throat: mucosa pink and moist. No lesions. Dentition in good repair. Neck:Supple, without lymphadenopathy. Thyroid normal, No bruits. Pulmonary/Chest: Clear to auscultation, without wheezes, rales, or rhonchi. No dullness to percussion. Cardiovascular: Regular rate and rhythm without murmurs, rubs, or gallops. Abdomen: Soft, non tender. Bowel sounds normal. No organomegaly  All four Extremities: no swelling, tenderness on palapation of left ankle, tenderness on palpation of lower extremity, restricted ROM left ankle, no warmth or redness. Neurologic: No gross sensory or motor deficits. Skin: Warm and dry with good turgor. No signs of peripheral arterial or venous insufficiency. No ulcerations. No open wounds.     Medical Decision Making -Laboratory:   I have independently reviewed/ordered the following labs:    CBC with Differential:   Recent Labs     11/16/22 0624   WBC 12.4*   HGB 11.1*   HCT 34.4*   *   LYMPHOPCT 9*   MONOPCT 9*       BMP:   Recent Labs     11/14/22  0527 11/16/22 0624   NA  --  137   K 4.0 3.4*   CL  --  99   CO2  --  26   BUN  --  25*   CREATININE  --  0.80   MG 1.9 2.1     Hepatic Function Panel: No results for input(s): PROT, LABALBU, BILIDIR, IBILI, BILITOT, ALKPHOS, ALT, AST in the last 72 hours. No results for input(s): RPR in the last 72 hours. No results for input(s): HIV in the last 72 hours. No results for input(s): BC in the last 72 hours. Lab Results   Component Value Date/Time    MUCUS 1+ 11/16/2022 05:30 AM    RBC 4.65 11/16/2022 06:24 AM    RBC 4.85 02/13/2012 11:22 AM    TRICHOMONAS NOT REPORTED 03/26/2016 02:30 PM    WBC 12.4 11/16/2022 06:24 AM    YEAST NOT REPORTED 03/26/2016 02:30 PM    TURBIDITY Clear 11/16/2022 05:30 AM     Lab Results   Component Value Date/Time    CREATININE 0.80 11/16/2022 06:24 AM    GLUCOSE 92 11/16/2022 06:24 AM    GLUCOSE 105 02/13/2012 11:22 AM       Medical Decision Making-Imaging:     Narrative   EXAMINATION:   THREE XRAY VIEWS OF THE LEFT FOOT       11/12/2022 6:51 am       COMPARISON:   None. HISTORY:   ORDERING SYSTEM PROVIDED HISTORY: infection   TECHNOLOGIST PROVIDED HISTORY:   infection       FINDINGS:   There is no acute osseous abnormality. The joint spaces are maintained. There is soft tissue swelling. Impression   Soft tissue swelling without acute osseous abnormality. Narrative   EXAMINATION:   THREE XRAY VIEWS OF THE LEFT ANKLE       11/12/2022 6:51 am       COMPARISON:   None. HISTORY:   ORDERING SYSTEM PROVIDED HISTORY: infection   TECHNOLOGIST PROVIDED HISTORY:   infection       FINDINGS:   There is no acute osseous abnormality. The joint spaces are maintained. The   surrounding soft tissues are unremarkable. Impression   No acute osseous or soft tissue abnormality. Narrative   EXAMINATION:   ONE XRAY VIEW OF THE CHEST       11/11/2022 7:51 pm       COMPARISON:   05/17/2016       HISTORY:   ORDERING SYSTEM PROVIDED HISTORY: Sepsis   TECHNOLOGIST PROVIDED HISTORY:   Sepsis   Reason for Exam: upr,leg pain,sepsis       Initial encounter       FINDINGS:   Status post median sternotomy.   Cardiac AICD is noted. No focal   consolidation, pleural effusion or pneumothorax. The cardiomediastinal   silhouette is stable. No overt pulmonary edema. The osseous structures are   stable. Impression   No acute cardiopulmonary findings. Narrative   EXAMINATION:   CT OF THE LEFT FOOT WITHOUT CONTRAST 11/11/2022 6:36 pm       TECHNIQUE:   CT of the left foot was performed without the administration of intravenous   contrast.  Multiplanar reformatted images are provided for review. Automated   exposure control, iterative reconstruction, and/or weight based adjustment of   the mA/kV was utilized to reduce the radiation dose to as low as reasonably   achievable. COMPARISON:   None. HISTORY   ORDERING SYSTEM PROVIDED HISTORY: possible tarsal avulsion seen on prior xray   TECHNOLOGIST PROVIDED HISTORY:   possible tarsal avulsion seen on prior xray   Decision Support Exception - unselect if not a suspected or confirmed   emergency medical condition->Emergency Medical Condition (MA)       FINDINGS:   The distal tibia and fibula are intact. No syndesmotic widening. The ankle   mortise is intact. Mild chondrocalcinosis of the tibiotalar joint. The   tibial plafond and talar dome are normal in appearance. The subtalar joint   is unremarkable. The talonavicular and calcaneocuboid joints are   unremarkable. The naviculocuneiform articulations are unremarkable. Mild   2nd tarsometatarsal degenerative changes. Normal midfoot alignment is   maintained. No Lisfranc interval widening. Mild chondrocalcinosis of the   midfoot. The metatarsophalangeal and interphalangeal joints are intact. No   fracture or dislocation identified. No osseous erosion or periosteal   reaction. Mild atherosclerotic vascular calcifications. The visualized tendons are   grossly intact. Dorsal mid and forefoot subcutaneous edema. Circumferential   subcutaneous edema about the ankle.   No soft tissue gas or drainable fluid   collection. Impression   1. No acute osseous abnormality. Normal midfoot alignment. 2. Nonspecific subcutaneous edema. No drainable fluid collection or soft   tissue gas. Narrative   EXAMINATION:   CT OF THE LUMBAR SPINE WITH CONTRAST  11/14/2022 11:58 am       TECHNIQUE:   CT of the lumbar spine was performed with the administration of intravenous   contrast. Multiplanar reformatted images are provided for review. Automated   exposure control, iterative reconstruction, and/or weight based adjustment of   the mA/kV was utilized to reduce the radiation dose to as low as reasonably   achievable. COMPARISON:   Lumbar spine radiographs 11/08/2008       HISTORY:   ORDERING SYSTEM PROVIDED HISTORY: concern for slipped disc as well as   possible mass/psoas abbcess or lytic/blasic lesions suggesting underlying   maligancy contributing to possible pathological fracture. TECHNOLOGIST PROVIDED HISTORY:   concern for slipped disc as well as possible mass/psoas abbcess or   lytic/blasic lesions suggesting underlying maligancy contributing to possible   pathological fracture. FINDINGS:   BONES/ALIGNMENT:  There is a levoconvex lumbar scoliosis. There is   multilevel disc space narrowing and degenerative endplate sclerosis   throughout the lower thoracic and lumbar spine. There is no spondylolysis. No lytic or blastic osseous lesions are identified. SOFT TISSUES: There is no paraspinal mass identified. There is no abnormal   fluid collection identified. Severe atherosclerotic calcifications are   present within the abdominal aorta. L1-L2: There is a disc bulge and osteophyte formation. There is bilateral   facet arthropathy. There is severe left neural foraminal narrowing. No   significant spinal canal stenosis or right neural foraminal narrowing is   present. L2-L3: There is a disc bulge, facet arthropathy and thickening of the   ligamentum flavum. There is severe left neural foraminal narrowing. There   is no significant right neural foraminal narrowing. Mild spinal canal   stenosis is present. L3-L4: There is a disc bulge, facet arthropathy and thickening of the   ligamentum flavum. , severe right and moderate left neural foraminal   narrowing. There is moderate spinal canal stenosis. L4-L5: There is a disc bulge, facet arthropathy and thickening of the   ligamentum flavum. There is severe spinal canal stenosis, severe right and   mild left neural foraminal narrowing. L5-S1: There is a disc bulge, posterior osteophyte formation and facet   arthropathy. There is no significant spinal canal stenosis. Severe   bilateral neural foraminal narrowing is present. Impression   1. No findings to suggest acute discitis/osteomyelitis. 2. No abscess identified. 3. Severe multilevel degenerative changes of the lumbar spine, as described   above, but most pronounced at L4-5 where there is severe spinal canal   stenosis, severe right and mild left neural foraminal narrowing. Narrative   EXAMINATION:   CT OF THE LUMBAR SPINE WITH CONTRAST  11/15/2022 1:38 pm       TECHNIQUE:   CT of the lumbar spine was performed with the administration of intravenous   contrast. Multiplanar reformatted images are provided for review. Automated   exposure control, iterative reconstruction, and/or weight based adjustment of   the mA/kV was utilized to reduce the radiation dose to as low as reasonably   achievable. COMPARISON:   None       HISTORY:   ORDERING SYSTEM PROVIDED HISTORY: obtain after myelogram for lumbar stenosis   TECHNOLOGIST PROVIDED HISTORY:   obtain after myelogram for lumbar stenosis   Reason for Exam: obtain after myelogram for lumbar stenosis       FINDINGS:   BONES/ALIGNMENT:  There is levocurvature of the lumbar spine. The vertebral   body heights are maintained. There is no spondylolisthesis.        SOFT TISSUES: The the case, including pertinent history and exam findings with the APRN. I have evaluated the  History, physical findings and pictures of the patient and the key elements of the encounter have been performed by me. I have reviewed the laboratory data, other diagnostic studies and discussed them with the APRN. I have updated the medical record where necessary. I agree with the assessment, plan and orders as documented by the APRN.     Nano Harrell MD.      Pager: [phone] - Office: [phone]

## 2022-11-17 LAB
C-REACTIVE PROTEIN: 283.5 MG/L (ref 0–5)
CULTURE: ABNORMAL
CULTURE: NO GROWTH
CULTURE: NORMAL
CULTURE: NORMAL
DIRECT EXAM: ABNORMAL
DIRECT EXAM: ABNORMAL
EKG ATRIAL RATE: 93 BPM
EKG P AXIS: 36 DEGREES
EKG P-R INTERVAL: 168 MS
EKG Q-T INTERVAL: 398 MS
EKG QRS DURATION: 124 MS
EKG QTC CALCULATION (BAZETT): 494 MS
EKG R AXIS: -52 DEGREES
EKG T AXIS: 18 DEGREES
EKG VENTRICULAR RATE: 93 BPM
Lab: NORMAL
Lab: NORMAL
SPECIMEN DESCRIPTION: ABNORMAL
SPECIMEN DESCRIPTION: NORMAL

## 2022-11-17 PROCEDURE — 97530 THERAPEUTIC ACTIVITIES: CPT

## 2022-11-17 PROCEDURE — 6370000000 HC RX 637 (ALT 250 FOR IP): Performed by: NURSE PRACTITIONER

## 2022-11-17 PROCEDURE — 93010 ELECTROCARDIOGRAM REPORT: CPT | Performed by: INTERNAL MEDICINE

## 2022-11-17 PROCEDURE — 99232 SBSQ HOSP IP/OBS MODERATE 35: CPT | Performed by: STUDENT IN AN ORGANIZED HEALTH CARE EDUCATION/TRAINING PROGRAM

## 2022-11-17 PROCEDURE — 6360000002 HC RX W HCPCS: Performed by: NURSE PRACTITIONER

## 2022-11-17 PROCEDURE — 93005 ELECTROCARDIOGRAM TRACING: CPT | Performed by: STUDENT IN AN ORGANIZED HEALTH CARE EDUCATION/TRAINING PROGRAM

## 2022-11-17 PROCEDURE — 2060000000 HC ICU INTERMEDIATE R&B

## 2022-11-17 PROCEDURE — 99232 SBSQ HOSP IP/OBS MODERATE 35: CPT | Performed by: INTERNAL MEDICINE

## 2022-11-17 PROCEDURE — 6370000000 HC RX 637 (ALT 250 FOR IP): Performed by: INTERNAL MEDICINE

## 2022-11-17 PROCEDURE — 36415 COLL VENOUS BLD VENIPUNCTURE: CPT

## 2022-11-17 PROCEDURE — 51798 US URINE CAPACITY MEASURE: CPT

## 2022-11-17 PROCEDURE — 2580000003 HC RX 258: Performed by: NURSE PRACTITIONER

## 2022-11-17 PROCEDURE — APPSS30 APP SPLIT SHARED TIME 16-30 MINUTES: Performed by: NURSE PRACTITIONER

## 2022-11-17 PROCEDURE — 6360000002 HC RX W HCPCS: Performed by: STUDENT IN AN ORGANIZED HEALTH CARE EDUCATION/TRAINING PROGRAM

## 2022-11-17 PROCEDURE — 86140 C-REACTIVE PROTEIN: CPT

## 2022-11-17 RX ADMIN — KETOROLAC TROMETHAMINE 30 MG: 15 INJECTION, SOLUTION INTRAMUSCULAR; INTRAVENOUS at 11:05

## 2022-11-17 RX ADMIN — METOPROLOL TARTRATE 25 MG: 25 TABLET ORAL at 21:58

## 2022-11-17 RX ADMIN — BUDESONIDE AND FORMOTEROL FUMARATE DIHYDRATE 2 PUFF: 160; 4.5 AEROSOL RESPIRATORY (INHALATION) at 20:50

## 2022-11-17 RX ADMIN — COLCHICINE 0.6 MG: 0.6 TABLET, FILM COATED ORAL at 08:17

## 2022-11-17 RX ADMIN — GABAPENTIN 100 MG: 100 CAPSULE ORAL at 21:30

## 2022-11-17 RX ADMIN — TRAZODONE HYDROCHLORIDE 150 MG: 50 TABLET ORAL at 21:58

## 2022-11-17 RX ADMIN — SODIUM CHLORIDE, PRESERVATIVE FREE 10 ML: 5 INJECTION INTRAVENOUS at 08:17

## 2022-11-17 RX ADMIN — KETOROLAC TROMETHAMINE 30 MG: 15 INJECTION, SOLUTION INTRAMUSCULAR; INTRAVENOUS at 21:58

## 2022-11-17 RX ADMIN — ENOXAPARIN SODIUM 40 MG: 100 INJECTION SUBCUTANEOUS at 08:17

## 2022-11-17 RX ADMIN — DESMOPRESSIN ACETATE 40 MG: 0.2 TABLET ORAL at 21:58

## 2022-11-17 RX ADMIN — COLCHICINE 0.6 MG: 0.6 TABLET, FILM COATED ORAL at 21:58

## 2022-11-17 RX ADMIN — ARIPIPRAZOLE 10 MG: 10 TABLET ORAL at 08:17

## 2022-11-17 RX ADMIN — FUROSEMIDE 40 MG: 40 TABLET ORAL at 08:17

## 2022-11-17 RX ADMIN — LISINOPRIL 2.5 MG: 2.5 TABLET ORAL at 08:17

## 2022-11-17 RX ADMIN — GABAPENTIN 100 MG: 100 CAPSULE ORAL at 08:17

## 2022-11-17 RX ADMIN — BUPROPION HYDROCHLORIDE 450 MG: 150 TABLET, EXTENDED RELEASE ORAL at 08:17

## 2022-11-17 RX ADMIN — METOPROLOL TARTRATE 25 MG: 25 TABLET ORAL at 08:16

## 2022-11-17 RX ADMIN — VILAZODONE HYDROCHLORIDE 20 MG: 20 TABLET ORAL at 08:16

## 2022-11-17 RX ADMIN — SODIUM CHLORIDE, PRESERVATIVE FREE 10 ML: 5 INJECTION INTRAVENOUS at 21:58

## 2022-11-17 RX ADMIN — DOCUSATE SODIUM 50 MG AND SENNOSIDES 8.6 MG 2 TABLET: 8.6; 5 TABLET, FILM COATED ORAL at 08:16

## 2022-11-17 ASSESSMENT — PAIN DESCRIPTION - ORIENTATION
ORIENTATION: RIGHT
ORIENTATION: RIGHT
ORIENTATION: LEFT;RIGHT;MID

## 2022-11-17 ASSESSMENT — PAIN SCALES - GENERAL
PAINLEVEL_OUTOF10: 7
PAINLEVEL_OUTOF10: 8
PAINLEVEL_OUTOF10: 7

## 2022-11-17 ASSESSMENT — PAIN DESCRIPTION - DESCRIPTORS: DESCRIPTORS: ACHING

## 2022-11-17 ASSESSMENT — PAIN DESCRIPTION - LOCATION
LOCATION: BACK;LEG
LOCATION: BACK;LEG
LOCATION: BACK

## 2022-11-17 NOTE — PROGRESS NOTES
Neurosurgery [facility]/Resident    Daily Progress Note   Chief Complaint   Patient presents with    Ankle Pain     11/17/2022  12:38 PM    Chart reviewed. No acute events overnight. No new complaints. Vitals:    11/17/22 0315 11/17/22 0343 11/17/22 0700 11/17/22 1105   BP: 134/76  (!) 127/92 126/82   Pulse: 84  89 79   Resp: 20  12 18   Temp: 98.5 °F (36.9 °C)  98.2 °F (36.8 °C) 98 °F (36.7 °C)   TempSrc: Oral  Oral Oral   SpO2: 91%   90%   Weight:  198 lb 6.6 oz (90 kg)     Height:             PE:   Awake, alert, NAD  Motor   L iliopsoas 3+/5 , R iliopsoas 3+/5  L quadriceps 3+/5; R quadriceps 3+/5  L Dorsiflexion 3/5; R dorsiflexion 3/5  L Plantarflexion 3/5; R plantarflexion 3/5  L EHL 3/5; R EHL 3/5    Sensation intact         Lab Results   Component Value Date    WBC 12.4 (H) 11/16/2022    HGB 11.1 (L) 11/16/2022    HCT 34.4 (L) 11/16/2022     (H) 11/16/2022    CHOL 179 12/01/2017    TRIG 173 12/01/2017    HDL 47 12/01/2017    ALT <5 (L) 11/12/2022    AST 11 11/12/2022     11/16/2022    K 3.4 (L) 11/16/2022    CL 99 11/16/2022    CREATININE 0.80 11/16/2022    BUN 25 (H) 11/16/2022    CO2 26 11/16/2022    TSH 0.36 11/11/2022    PSA 0.88 11/12/2022    INR 1.2 11/12/2022    LABA1C 5.8 12/01/2017    .5 (H) 11/17/2022    SEDRATE 117 (H) 11/11/2022         A/P  70 y.o. male who presents with multilevel degenerative changes with stenosis                  - recommend L2-4 laminectomy, patient declines at this time   - plavix on hold   - f/u cardiology clearance/optimization      Please contact neurosurgery with any changes in patients neurologic status.        Anjelica Baron PA-C  11/17/22  12:38 PM

## 2022-11-17 NOTE — PLAN OF CARE
Problem: Discharge Planning  Goal: Discharge to home or other facility with appropriate resources  11/16/2022 2315 by Tenzin Morrissey RN  Outcome: Progressing  Flowsheets (Taken 11/16/2022 2000)  Discharge to home or other facility with appropriate resources: Identify barriers to discharge with patient and caregiver  11/16/2022 1634 by Jonathon Koch RN  Outcome: Progressing     Problem: Pain  Goal: Verbalizes/displays adequate comfort level or baseline comfort level  11/16/2022 2315 by Tenzin Morrissey RN  Outcome: Progressing  11/16/2022 1634 by Jonathon Koch RN  Outcome: Progressing     Problem: Safety - Adult  Goal: Free from fall injury  11/16/2022 2315 by Tenzin Morrissey RN  Outcome: Progressing  11/16/2022 1634 by Jonathon Koch RN  Outcome: Progressing     Problem: ABCDS Injury Assessment  Goal: Absence of physical injury  11/16/2022 2315 by Tenzin Morrissey RN  Outcome: Progressing  11/16/2022 1634 by Jonathon Koch RN  Outcome: Progressing     Problem: Respiratory - Adult  Goal: Achieves optimal ventilation and oxygenation  11/16/2022 2315 by Tenzin Morrissey RN  Outcome: Progressing  Flowsheets (Taken 11/16/2022 2000)  Achieves optimal ventilation and oxygenation: Assess for changes in respiratory status  11/16/2022 1634 by Jonathon Koch RN  Outcome: Progressing     Problem: Skin/Tissue Integrity  Goal: Absence of new skin breakdown  Description: 1. Monitor for areas of redness and/or skin breakdown  2. Assess vascular access sites hourly  3. Every 4-6 hours minimum:  Change oxygen saturation probe site  4. Every 4-6 hours:  If on nasal continuous positive airway pressure, respiratory therapy assess nares and determine need for appliance change or resting period.   11/16/2022 2315 by Tenzin Morrissey RN  Outcome: Progressing  11/16/2022 1634 by Jonathon Koch RN  Outcome: Progressing

## 2022-11-17 NOTE — CONSULTS
[facility] Cardiology Cardiology    Consult / H&P               Today's Date: 11/17/2022  Patient Name: Bre Cmaeron  Date of admission: 11/11/2022  3:49 PM  Patient's age: 70 y. o., 1950  Admission Dx: Sepsis ([facility] [place] [address] [A41.9]  Sepsis without acute organ dysfunction, due to unspecified organism [facility]) [A41.9]    Reason for Consult:  Cardiac evaluation    Requesting Physician: No admitting provider for patient encounter. REASON FOR CONSULT: Risk stratification for lumbar laminectomy. History Obtained From:  patient, electronic medical record    HISTORY OF PRESENT ILLNESS:      The patient is a 70 y.o. who was initially admitted to the medicine service with multilevel spinal vertebral degenerative changes with stenosis. Plan is for L2-L4 laminectomy pending cardiac restratification. Patient does have a history of ischemic cardiomyopathy status post CABG X 3 and HFrEF with last LVEF 20 to 25% in 2016, recently recovered to 55% and AICD. No EKG on file this admission. Previous EKGs showed RBBB with left anterior fascicular block. Echo shows recovered LVEF 55%. Past Medical History:   has a past medical history of Abnormal EKG, Bladder tumor, CAD (coronary artery disease), Clinical trial participant at discharge, COPD (chronic obstructive pulmonary disease) ([facility] [place] [address] Depression, GERD (gastroesophageal reflux disease), Hearing loss, History of blood transfusion, Hyperlipidemia, Hypertension, Panic attacks, Prostate hypertrophy, Under care of team, and Wellness examination. Past Surgical History:   has a past surgical history that includes Tonsillectomy and adenoidectomy (child); Cystoscopy; lipoma resection (child); Cystoscopy (01/15/2016); Coronary artery bypass graft (03/27/2016); Cardiac defibrillator placement (09/22/2016); hernia repair (Right); bladder tumor excision (01/15/2016); Cystocopy (10/26/2022); Cystoscopy (N/A, 10/26/2022); and Coronary artery bypass graft (04/01/2016).      Home Medications:    Prior to Admission medications    Medication Sig Start Date End Date Taking? Authorizing Provider   colchicine (COLCRYS) 0.6 MG tablet Take 1 tablet by mouth 2 times daily 11/16/22  Yes Rah Sales MD   colchicine (COLCRYS) 0.6 MG tablet Take 2 tablets by mouth once for 1 dose 11/16/22 11/16/22 Yes Rah Sales MD   oxyCODONE (ROXICODONE) 5 MG immediate release tablet Take 1 tablet by mouth every 4 hours as needed for Pain for up to 5 days. 11/16/22 11/21/22 Yes Rah Sales MD   ARIPiprazole (ABILIFY) 10 MG tablet Take 10 mg by mouth daily   Yes Historical Provider, MD   clopidogrel (PLAVIX) 75 MG tablet Take 1 tablet by mouth daily Hold for 5 days after procedure 10/26/22   Silvia Reed PA-C   vilazodone HCl (VIIBRYD) 20 MG TABS Take 20 mg by mouth daily 9/19/20   Historical Provider, MD   Fluticasone furoate-vilanterol (BREO ELLIPTA) 200-25 MCG/INH AEPB inhaler Take 200 mcg by mouth 12/10/18   Historical Provider, MD   gabapentin (NEURONTIN) 100 MG capsule Take 100 mg by mouth 3 times daily.      Historical Provider, MD   fenofibrate (TRICOR) 48 MG tablet Take 48 mg by mouth daily    Historical Provider, MD   lisinopril (PRINIVIL;ZESTRIL) 2.5 MG tablet Take 2.5 mg by mouth daily    Historical Provider, MD   traZODone (DESYREL) 150 MG tablet TAKE ONE TABLET BY MOUTH ONCE NIGHTLY 11/27/17   Mike Stewart MD   atorvastatin (LIPITOR) 40 MG tablet TAKE ONE TABLET BY MOUTH ONCE NIGHTLY 11/27/17   Mike Stewart MD   metoprolol tartrate (LOPRESSOR) 25 MG tablet TAKE ONE-HALF TABLET BY MOUTH TWO TIMES A DAY 11/27/17   Mike Stewart MD   buPROPion (WELLBUTRIN XL) 150 MG extended release tablet TAKE ONE TABLET BY MOUTH EVERY MORNING  Patient taking differently: Take 450 mg by mouth every morning 9/21/17   Mike Stewart MD   hydrOXYzine (ATARAX) 25 MG tablet Take 25 mg by mouth 3 times daily as needed    Historical Provider, MD   potassium chloride (MICRO-K) 10 MEQ CR capsule Take 10 mEq by mouth 2 times daily    Historical Provider, MD   furosemide (LASIX) 20 MG tablet Take 40 mg by mouth daily     Historical Provider, MD      Current Facility-Administered Medications: ketorolac (TORADOL) injection 30 mg, 30 mg, IntraVENous, Q6H PRN  colchicine (COLCRYS) tablet 1.2 mg, 1.2 mg, Oral, Once  colchicine (COLCRYS) tablet 0.6 mg, 0.6 mg, Oral, BID  potassium chloride (KLOR-CON M) extended release tablet 40 mEq, 40 mEq, Oral, PRN **OR** potassium bicarb-citric acid (EFFER-K) effervescent tablet 40 mEq, 40 mEq, Oral, PRN **OR** potassium chloride 10 mEq/100 mL IVPB (Peripheral Line), 10 mEq, IntraVENous, PRN  magnesium sulfate 1000 mg in dextrose 5% 100 mL IVPB, 1,000 mg, IntraVENous, PRN  polyethylene glycol (GLYCOLAX) packet 17 g, 17 g, Oral, Daily  atorvastatin (LIPITOR) tablet 40 mg, 40 mg, Oral, Nightly  buPROPion (WELLBUTRIN XL) extended release tablet 450 mg, 450 mg, Oral, QAM  [Held by provider] clopidogrel (PLAVIX) tablet 75 mg, 75 mg, Oral, Daily  budesonide-formoterol (SYMBICORT) 160-4.5 MCG/ACT inhaler 2 puff, 2 puff, Inhalation, BID  furosemide (LASIX) tablet 40 mg, 40 mg, Oral, Daily  lisinopril (PRINIVIL;ZESTRIL) tablet 2.5 mg, 2.5 mg, Oral, Daily  metoprolol tartrate (LOPRESSOR) tablet 25 mg, 25 mg, Oral, BID  traZODone (DESYREL) tablet 150 mg, 150 mg, Oral, Nightly  vilazodone HCl (VIIBRYD) TABS 20 mg, 20 mg, Oral, Daily  sodium chloride flush 0.9 % injection 5-40 mL, 5-40 mL, IntraVENous, 2 times per day  sodium chloride flush 0.9 % injection 5-40 mL, 5-40 mL, IntraVENous, PRN  0.9 % sodium chloride infusion, , IntraVENous, PRN  enoxaparin (LOVENOX) injection 40 mg, 40 mg, SubCUTAneous, Daily  acetaminophen (TYLENOL) tablet 650 mg, 650 mg, Oral, Q6H PRN **OR** acetaminophen (TYLENOL) suppository 650 mg, 650 mg, Rectal, Q6H PRN  albuterol (PROVENTIL) nebulizer solution 2.5 mg, 2.5 mg, Nebulization, As Directed RT PRN  gabapentin (NEURONTIN) capsule 100 mg, 100 mg, Oral, TID  sennosides-docusate sodium (SENOKOT-S) 8.6-50 MG tablet 2 tablet, 2 tablet, Oral, Daily  ARIPiprazole (ABILIFY) tablet 10 mg, 10 mg, Oral, Daily  Facility-Administered Medications Ordered in Other Encounters: lactated ringers infusion 1,000 mL, 1,000 mL, IntraVENous, Continuous  fentaNYL (SUBLIMAZE) injection 25 mcg, 25 mcg, IntraVENous, Q5 Min PRN  fentaNYL (SUBLIMAZE) injection 50 mcg, 50 mcg, IntraVENous, Q5 Min PRN    Allergies:  Patient has no known allergies. Social History:   reports that he quit smoking about a year ago. His smoking use included cigars and cigarettes. He started smoking about 56 years ago. He smoked an average of .5 packs per day. He quit smokeless tobacco use about 6 years ago. His smokeless tobacco use included snuff and chew. He reports current alcohol use of about 7.0 standard drinks per week. He reports current drug use. Drug: Marijuana Burnell Goldberg). Family History: family history includes Cancer in his father; Heart Surgery in his mother; Other in his mother. REVIEW OF SYSTEMS:    Constitutional: there has been no unanticipated weight loss. There's been No change in energy level, No change in activity level. Eyes: No visual changes or diplopia. No scleral icterus. ENT: No Headaches  Cardiovascular: As above. Respiratory: No previous pulmonary problems, No cough  Gastrointestinal: No abdominal pain. No change in bowel or bladder habits. Genitourinary: No dysuria, trouble voiding, or hematuria. Musculoskeletal:  No gait disturbance, No weakness or joint complaints. Integumentary: No rash or pruritis. Neurological: No headache, diplopia, change in muscle strength, numbness or tingling. No change in gait, balance, coordination, mood, affect, memory, mentation, behavior. Psychiatric: No anxiety, or depression. Endocrine: No temperature intolerance. No excessive thirst, fluid intake, or urination. No tremor.   Hematologic/Lymphatic: No abnormal bruising or bleeding, blood clots or swollen lymph nodes. Allergic/Immunologic: No nasal congestion or hives. PHYSICAL EXAM:      BP (!) 127/92   Pulse 89   Temp 98.2 °F (36.8 °C) (Oral)   Resp 12   Ht 5' 9\" (1.753 m)   Wt 198 lb 6.6 oz (90 kg)   SpO2 91%   BMI 29.30 kg/m²    Constitutional and General Appearance: alert, cooperative, no distress and appears stated age  HEENT: PERRL, no cervical lymphadenopathy. No masses palpable. Normal oral mucosa  Respiratory:  Normal excursion and expansion without use of accessory muscles  Resp Auscultation: Good respiratory effort. No for increased work of breathing. On auscultation: clear to auscultation bilaterally  Cardiovascular: The apical impulse is not displaced  Heart tones are crisp and normal. regular S1 and S2.  Jugular venous pulsation Normal  The carotid upstroke is normal in amplitude and contour without delay or bruit  Peripheral pulses are symmetrical and full   Abdomen:   No masses or tenderness  Bowel sounds present  Extremities:   No Cyanosis or Clubbing   Lower extremity edema: No   Skin: Warm and dry  Neurological:  Alert and oriented. Moves all extremities well  No abnormalities of mood, affect, memory, mentation, or behavior are noted        Labs:     CBC:   Recent Labs     11/16/22 0624   WBC 12.4*   HGB 11.1*   HCT 34.4*   *     BMP:   Recent Labs     11/16/22 0624      K 3.4*   CO2 26   BUN 25*   CREATININE 0.80   LABGLOM >60   GLUCOSE 92     BNP: No results for input(s): BNP in the last 72 hours. PT/INR: No results for input(s): PROTIME, INR in the last 72 hours. APTT:No results for input(s): APTT in the last 72 hours. CARDIAC ENZYMES:No results for input(s): CKTOTAL, CKMB, CKMBINDEX, TROPONINI in the last 72 hours.   FASTING LIPID PANEL:  Lab Results   Component Value Date/Time    HDL 47 12/01/2017 12:00 AM    LDLCALC 97 12/01/2017 12:00 AM    TRIG 173 12/01/2017 12:00 AM     LIVER PROFILE:No results for input(s): AST, ALT, LABALBU in the last 72 hours. DATA:    Diagnostics:    ECHO 03/24/16: EF 20-25%. Trivial AI. Mild MR/TR. RVSP 57.     Echo 11/15/2022:  Normal LV size, mildly increased LV wall thickness  Hypokinetic anteroseptal wall  EF 55%  RV appears mildly dilated, function appears normal  ICD leads noted in the Rt heart chambers  LA appears dilated  No obvious significant structural valvular abnormality note  No significant pericardial effusion seen  No obvious evidence of endocarditis noted     CATH 03/24/16: MV CAD. EF 30%      Cardiac cath 3/24/3016: LM nml, LAD 80%, LCX 40%, Ramus 80%, PL 90%, EF 30% -> CABG X4     IMPRESSION:    Multilevel degenerative spinal stenosis plan for lumbar laminectomy as per neurosurgery. Ischemic cardiomyopathy with recently recovered LVEF status post CABG X3 in 2016 LIMA to LAD, SVG to PDA to PL, SVG to RI. Hypertension. Hyperlipidemia. Smoker. COPD      RECOMMENDATIONS:  RCRI risk or of 3. Functional capacity is difficult to assess, although patient does not complain of any exertional chest pain or dyspnea. Not very active due to back pain. Patient will likely be intermediate risk for lumbar laminectomy, however will discuss with Dr. Alessandro Dubon for final recommendations      Discussed with patient and Nurse. Carl Camp MD       Cardiovascular Fellow    I performed a history and physical examination of the patient and discussed management with the resident. I reviewed the residents note and agree with the documented findings and plan of care. Any areas of disagreement are noted on the chart. I was personally present for the key portions of any procedures. I have documented in the chart those procedures where I was not present during the key portions. I have personally evaluated this patient and have completed at least one if not all key elements of the E/M (history, physical exam, and MDM). Additional findings are as noted.     Moderate risk for lumbar laminectomy    Kourtney Martin MD

## 2022-11-17 NOTE — PROGRESS NOTES
Physical Therapy  Facility/Department: [facility] CAR 2- STEPDOWN  Physical Therapy treatment    Name: Nanda Pollard  : 1950  MRN: [id number]  Date of Service: 2022    Discharge Recommendations:  Further therapy recommended at discharge. PT Equipment Recommendations  Other: CTA, pt not currently tolerating ambulation      Patient Diagnosis(es): The primary encounter diagnosis was Sepsis without acute organ dysfunction, due to unspecified organism ([facility] [address] Diagnoses of Monoarticular arthritis and Pseudogout of right ankle were also pertinent to this visit. Past Medical History:  has a past medical history of Abnormal EKG, Bladder tumor, CAD (coronary artery disease), Clinical trial participant at discharge, COPD (chronic obstructive pulmonary disease) ([facility] [address] Depression, GERD (gastroesophageal reflux disease), Hearing loss, History of blood transfusion, Hyperlipidemia, Hypertension, Panic attacks, Prostate hypertrophy, Under care of team, and Wellness examination. Past Surgical History:  has a past surgical history that includes Tonsillectomy and adenoidectomy (child); Cystoscopy; lipoma resection (child); Cystoscopy (01/15/2016); Coronary artery bypass graft (2016); Cardiac defibrillator placement (2016); hernia repair (Right); bladder tumor excision (01/15/2016); Cystocopy (10/26/2022); Cystoscopy (N/A, 10/26/2022); and Coronary artery bypass graft (2016). Assessment   Body Structures, Functions, Activity Limitations Requiring Skilled Therapeutic Intervention: Decreased functional mobility ; Decreased strength;Decreased balance; Increased pain  Assessment: Pt modA to EOB, Nancy to modA to stand to padmini stedy. increased time d/t pain throughout. Pt would benefit from continued acute PT to address deficits.   Therapy Prognosis: Good  Requires PT Follow-Up: Yes  Activity Tolerance  Activity Tolerance: Patient limited by pain  Activity Tolerance Comments: pain vs fear of pain limiting, increased time and significant cueing throughout. Plan   Physcial Therapy Plan  General Plan:  (5-6x/wk)  Current Treatment Recommendations: Strengthening, Balance training, Gait training, Functional mobility training, Stair training, Transfer training, Endurance training, Home exercise program, Safety education & training, Patient/Caregiver education & training, Therapeutic activities, Equipment evaluation, education, & procurement  Safety Devices  Type of Devices: Call light within reach, Gait belt, Nurse notified, Left in chair, Patient at risk for falls, All fall risk precautions in place  Restraints  Restraints Initially in Place: No     Restrictions  Restrictions/Precautions  Restrictions/Precautions: Fall Risk, General Precautions  Required Braces or Orthoses?: No  Implants present? :  (AICD)  Required Braces or Orthoses  Left Lower Extremity Brace: Boot  LLE Brace Type: LLE \"ok for walking boot when up and moving\" per ortho note filed 11/13  Position Activity Restriction  Other position/activity restrictions: up with assist     Subjective   General  Chart Reviewed: Yes  Patient assessed for rehabilitation services?: Yes  Response To Previous Treatment: Patient with no complaints from previous session. Family / Caregiver Present: No  Follows Commands: Within Functional Limits  General Comment  Comments: RN and pt agreeable to PT. Pt alert in bed upon arrival.  Subjective  Subjective: Pt reports pain \"hurts\", un quantified, in back.  Frequent slowing or ceasing certain activity though d/t high pain           Cognition   Orientation  Overall Orientation Status:  (Pt answer \"hospital\" for location from list of  options, once answering after third time it was asked.)  Cognition  Overall Cognitive Status:  (Pt largely appropriate, but intermittantly becomes significantly tangential.)  Initiation: Requires cues for all  Sequencing: Requires cues for all  Cognition Comment: Pt requesting sequencing throughout and what next activity would be, pain vs fear of pain. Objective        Strength RLE  Comment: some antigravity observed, weakness vs pain limiting. Strength LLE  Comment: some antigravity observed, weakness vs pain limiting. Bed mobility  Supine to Sit: Moderate assistance  Sit to Supine:  (left in chair)  Scooting: Contact guard assistance; Moderate assistance  Bed Mobility Comments: significantly increased time, Pt progresse BLE to EOB, began to roll then roll pillow placed to maintain, HHA modA to progress trunk to EOB, with modA to finish scooting EOB  Transfers  Sit to Stand: Minimal Assistance (to [address] stedy with significantly raised bed. attempted 2x to RW with minimal unweighting)  Stand to Sit: Moderate Assistance (to clear seats on padmini stedy, and Nancy to direct seating into chair. pillows in place under and behind pt for comfort)  Bed to Chair: Dependent/Total (via padmini stedy)  Ambulation  Comments: attempted standing 2x to RW, unable, pain vs fear of pain. stood to [place] as described     Balance  Posture: Fair  Sitting - Static: Fair;+  Sitting - Dynamic: Fair  Standing - Static: Fair  Comments: [place] used while assessing standing balance. Exercise Treatment: increased time for all mobility. encouragement throught, including edu on time in bed promoting immobility to incourage up in chair and mobility progression. Pt redirectable.  EOB ~25min total,        AM-PAC Score  AM-PAC Inpatient Mobility Raw Score : 9 (11/17/22 1219)  AM-PAC Inpatient T-Scale Score : 30.55 (11/17/22 1219)  Mobility Inpatient CMS 0-100% Score: 81.38 (11/17/22 1219)  Mobility Inpatient CMS G-Code Modifier : CM (11/17/22 1219)        Goals  Short Term Goals  Time Frame for Short Term Goals: 14 visits  Short Term Goal 1: Pt will be SBA bed mobility  Short Term Goal 2: Pt will be CGA transfers  Short Term Goal 3: Pt will be CGA amb 150' RW  Short Term Goal 4: Pt will navigate 4 steps SBA  Patient Goals Patient Goals : decrease pain, be able to mobilize       Education  Patient Education  Education Given To: Patient  Education Provided: Role of Therapy;Plan of Care  Education Method: Verbal;Demonstration  Barriers to Learning: Cognition  Education Outcome: Continued education needed;Verbalized understanding;Demonstrated understanding      Therapy Time   Individual Concurrent Group Co-treatment   Time In 0928         Time Out 1024         Minutes 56         Timed Code Treatment Minutes: 48 Minutes       Meeta Chester, PT

## 2022-11-17 NOTE — PROGRESS NOTES
[facility]  Office: [address], DO, Kapil Patella, DO, Lucero Prey, DO, Madalyn Doe Blood, DO, Santiago Avilez MD, Nereida Puente MD, Brian Cassidy MD, Francisca Lees MD,  Martha Zepeda MD, Lacey Duncan MD, Misti Vargas, DO, Letitia Cheng MD,  María Diggs MD, Zena Mcclendon MD, Valeria Tanner, DO, Ruth Cabral MD, Catrachito Elliott MD, Yin Mendez DO, Ruthann Salas MD, Jo Ann Bro MD, Fletcher Edwards MD, Tequila Avalos MD, Katie Almanza DO, Jeovany Ramirez MD, Kimberley Myers MD, Geraldine Nguyễn, CNP,  Ant Bhatti, CNP, Madalyn Lester, CNP, Merwyn Cooks, CNP,  Jeannie Borges, DNP, Berta Briceno, CNP, Al Berry, CNP, Aurelio Jaramillo, CNP, Jace Hoffmann, CNP, Asher Webster, CNP, Christian Massey [employer], Susanna Segovia, CNS, [place], DNP, Shani Siddiqui, CNP, Renate Arboleda, CNP, Oliver Montiel, CNP         Jerardo Rojas [address]    Progress Note    11/17/2022    8:19 AM    Name:   Berna Garibay  MRN:     [id number]     Acct:      [de-identified]   Room:   2005/2005-01  IP Day:  6  Admit Date:  11/11/2022  3:49 PM    PCP:   Abhijit Tran MD  Code Status:  Full Code    Subjective:     C/C:   Chief Complaint   Patient presents with    Ankle Pain     Interval History Status: not changed. Pt remains confused - thinks I am Sultana Cheese and that we are in his room  He is not able to make decisions for himself, and if he is to be consented will need family permission    Brief History: This is a 70year old male who presents to our hospital with complaints of left foot pain. CT of his left foot showed no acute osseous abnormality and normal midfoot alignment but did show subcutaneous edema without any drainable  fluid collection or soft tissue gas. Pt was febrile with Tmax 100.8, with WBC 17 and left shift. There was concern for septic arthritis.  Orthopedic surgery was consulted, he has aspiration of joint which showed white count 11,937, 96% neutrophil count. Gram stain/culture and crystals were ordered and pending: He was seen by Infectious disease and started him on vancomycin. A CT with contrast of his lumbar spine was done due to leg weakness and concern for possible abscess which showed no evidence of abscess    He has also had subsequent imaging from neurosurgery including IR myelogram and has severe canal stenosis but does not appear to be candidate for surgical intervention    Canal stenosis most severe @ L2-L3  Severe bilateral foraminal narrowing    Review of Systems:     Constitutional:  negative for chills, fevers, sweats  Respiratory:  negative for cough, dyspnea on exertion, shortness of breath, wheezing  Cardiovascular:  negative for chest pain, chest pressure/discomfort, lower extremity edema, palpitations  Gastrointestinal:  negative for abdominal pain, constipation, diarrhea, nausea, vomiting  Neurological:  negative for dizziness, headache, he is complaining of bilateral lower extremity leg weakness no urinary tension  EXT: admits to severe pain in left foot making it difficult to ambulate extremity     Medications:      Allergies:  No Known Allergies    Current Meds:   Scheduled Meds:    colchicine  1.2 mg Oral Once    colchicine  0.6 mg Oral BID    polyethylene glycol  17 g Oral Daily    atorvastatin  40 mg Oral Nightly    buPROPion  450 mg Oral QAM    [Held by provider] clopidogrel  75 mg Oral Daily    budesonide-formoterol  2 puff Inhalation BID    furosemide  40 mg Oral Daily    lisinopril  2.5 mg Oral Daily    metoprolol tartrate  25 mg Oral BID    traZODone  150 mg Oral Nightly    vilazodone HCl  20 mg Oral Daily    sodium chloride flush  5-40 mL IntraVENous 2 times per day    enoxaparin  40 mg SubCUTAneous Daily    gabapentin  100 mg Oral TID    sennosides-docusate sodium  2 tablet Oral Daily    ARIPiprazole  10 mg Oral Daily     Continuous Infusions:    sodium chloride 25 mL/hr at 22 0213     PRN Meds: ketorolac, potassium chloride **OR** potassium alternative oral replacement **OR** potassium chloride, magnesium sulfate, sodium chloride flush, sodium chloride, acetaminophen **OR** acetaminophen, albuterol    Data:     Past Medical History:   has a past medical history of Abnormal EKG, Bladder tumor, CAD (coronary artery disease), Clinical trial participant at discharge, COPD (chronic obstructive pulmonary disease) ([address] Depression, GERD (gastroesophageal reflux disease), Hearing loss, History of blood transfusion, Hyperlipidemia, Hypertension, Panic attacks, Prostate hypertrophy, Under care of team, and Wellness examination. Social History:   reports that he quit smoking about a year ago. His smoking use included cigars and cigarettes. He started smoking about 56 years ago. He smoked an average of .5 packs per day. He quit smokeless tobacco use about 6 years ago. His smokeless tobacco use included snuff and chew. He reports current alcohol use of about 7.0 standard drinks per week. He reports current drug use. Drug: Marijuana [place]). Family History:   Family History   Problem Relation Age of Onset    Other Mother         blind    Heart Surgery Mother     Cancer Father         leukemia       Vitals:  BP (!) 127/92   Pulse 89   Temp 98.2 °F (36.8 °C) (Oral)   Resp 12   Ht 5' 9\" (1.753 m)   Wt 198 lb 6.6 oz (90 kg)   SpO2 91%   BMI 29.30 kg/m²   Temp (24hrs), Av.4 °F (36.9 °C), Min:98.2 °F (36.8 °C), Max:98.5 °F (36.9 °C)    Recent Labs     22  2110   POCGLU 90       I/O (24Hr):     Intake/Output Summary (Last 24 hours) at 2022 0819  Last data filed at 2022 1712  Gross per 24 hour   Intake --   Output 750 ml   Net -750 ml       Labs:  Hematology:  Recent Labs     11/15/22  0307 22  0624 22  0341   WBC  --  12.4*  --    RBC  --  4.65  --    HGB  --  11.1*  --    HCT  --  34.4*  --    MCV  --  74.0*  --    MCH  --  23.9*  --    MCHC  --  32.3 --    RDW  --  17.2*  --    PLT  --  513*  --    MPV  --  10.5  --    .1*  --  283.5*     Chemistry:  Recent Labs     11/16/22  0624      K 3.4*   CL 99   CO2 26   GLUCOSE 92   BUN 25*   CREATININE 0.80   MG 2.1   ANIONGAP 12   LABGLOM >60   CALCIUM 9.3     Recent Labs     11/14/22  2110   POCGLU 90     ABG:  Lab Results   Component Value Date/Time    POCPH 7.41 03/28/2016 05:31 AM    POCPCO2 37 03/28/2016 05:31 AM    POCPO2 105 03/28/2016 05:31 AM    POCHCO3 23.3 03/28/2016 05:31 AM    NBEA 1 03/28/2016 05:31 AM    PBEA NOT REPORTED 03/28/2016 05:31 AM    HBV8BQL 24 03/28/2016 05:31 AM    JSQE2IAX 98 03/28/2016 05:31 AM    FIO2 40.0 03/28/2016 05:31 AM     Lab Results   Component Value Date/Time    SPECIAL LEFT AC 3ML 11/12/2022 02:19 AM    SPECIAL LEFT FOREARM 2ML 11/12/2022 02:19 AM     Lab Results   Component Value Date/Time    CULTURE NO GROWTH 5 DAYS 11/12/2022 08:54 AM       Radiology:  XR ANKLE LEFT (MIN 3 VIEWS)    Result Date: 11/12/2022  No acute osseous or soft tissue abnormality. XR FOOT LEFT (MIN 3 VIEWS)    Result Date: 11/12/2022  Soft tissue swelling without acute osseous abnormality. XR CHEST PORTABLE    Result Date: 11/11/2022  No acute cardiopulmonary findings. CT FOOT LEFT WO CONTRAST    Result Date: 11/11/2022  1. No acute osseous abnormality. Normal midfoot alignment. 2. Nonspecific subcutaneous edema. No drainable fluid collection or soft tissue gas. Physical Examination:        General appearance:  alert, cooperative and no distress  Mental Status:  oriented to self  Lungs:  clear to auscultation bilaterally, normal effort  Heart:  regular rate and rhythm, no murmur  Abdomen:  soft, nontender, nondistended, normal bowel sounds, no masses, hepatomegaly, splenomegaly  Extremities:  no edema, redness, tenderness in the calves. There is  pain with palpation of left leg, he is restricted with active and passive movement.   Muscle strength 4 out of 5 bilaterally  Skin:  no gross lesions, rashes, induration there is some erythema noted on left leg    Assessment:        Hospital Problems             Last Modified POA    * (Principal) Pseudogout of ankle 11/14/2022 Yes    Sepsis ([address] 11/12/2022 Yes    Acute left ankle pain 11/12/2022 Yes    Left leg weakness 11/12/2022 Yes    Primary hypertension 11/12/2022 Yes    Ischemic cardiomyopathy 11/12/2022 Yes    Microcytosis 11/12/2022 Yes    Class 1 obesity due to excess calories with serious comorbidity and body mass index (BMI) of 31.0 to 31.9 in adult 11/12/2022 Yes    COPD (chronic obstructive pulmonary disease) ([facility] [address] 11/12/2022 Yes    Monoarticular arthritis 11/12/2022 Yes    Fall 11/12/2022 Yes    SIRS (systemic inflammatory response syndrome) ([facility] 11/14/2022 Yes    Spinal stenosis of lumbar region at multiple levels 11/16/2022 Yes    Pseudogout involving multiple joints 11/16/2022 Yes    Hyperlipidemia 11/12/2022 Yes    S/P cardiac cath- Multivessel CAD 3/24/16-Dr. winters 11/12/2022 Yes    Acute hypokalemia 11/12/2022 Yes    S/P CABG x 4 11/12/2022 Yes      Plan:        Ankle pain due to Pseudogout:  Body fluid positive for Calcium pyrophosphate crystals. Start Colchicine. Outpatient orthopedic surgery evaluation for possible steroid injection. Stop abx THERE IS NO EVIDENCE OF SEPSIS  Altered mentation today likely toxic metabolic encephalopathy with underlying psychosis: Patient is on multiple psychotropic agents, he has been getting Roxicodone 5 times a day, as well as Atarax  Bilateral leg weakness: Myelogram is pending will likely need outpatient follow-up  Microcytosis without anemia: Tsat: 7%. continue iron supplementation. BPH: No retention. History of ischemic cardiomyopathy with EF of 20 to 25% on echo from 2016: Currently appears compensated. He has AICD in place. Continue  home lasix, lisinopril and BB.    History of coronary artery disease s/p CABG x4: Hold plavix incase pt needs surgical intervention continue lipitor. COPD: Stable. No exacerbation  Primary HTN: stable.    Toxic thyroid nodule has follow-up at [address] [facility] appropriate given continuing symptoms  Waxing waning mentation  Meds adjusted yesterday    Dispo: Plan for d/c pending final neurosurgery evaluation    Addy Seals MD  11/17/2022  8:19 AM

## 2022-11-17 NOTE — PROGRESS NOTES
[facility] - Progress Note  Today's Date and Time: 11/17/2022, 8:01 AM    Impression :   Left ankle effusion and pain after rolling his ankle during a fall   Low likelihood of septic arthritis  Positive crystal arthropathy  Lumbar stenosis  CAD s/p CABG  COPD  HFrEF, EF (2016): 20-25 %, s/p AICD placement 2016  HTN  Chronic back pain  H/o bladder masses (benign)    Recommendations:   Monitor off antibiotics:  Discontinued Vancomycin 11/14/22  D/C  Zosyn  Ankle culture: No growth  Bursa fluid positive for calcium pyrophosphate crystals  Ankle pain appears to be related to pseudogout  CT lumbar spine for lower extremity weakness-Showing severe lumbar stenosis  Neurosurgery consulted  CT myelogram completed 11/15/22  Patient leaning towards surgery for lumbar stenosis    Medical Decision Making/Summary/Discussion:11/17/2022     Patient presented to the hospital with left ankle pain, and bilateral leg weakness. Septic arthritis excluded. Pseudogout present with ca pyrophosphate crystals  Severe lumbar stenosis accounting for bilateral leg weakness. Patient is leaning towards surgery. Infection Control Recommendations   [place] Precautions    Antimicrobial Stewardship Recommendations     Discontinuation of therapy    Coordination of Outpatient Care:   Estimated Length of IV antimicrobials:TBD  Patient will need Midline Catheter Insertion: TBD  Patient will need PICC line Insertion:BD  Patient will need: Home IV , [place],  SNF,  LTAC: TBD  Patient will need outpatient wound care:No    Chief complaint/reason for consultation:   Concern for septic arthritis      History of Present Illness:   Alana Cox is a 70y.o.-year-old male who was initially admitted on 11/11/2022. Patient seen at the request of Dr. Hansen Nails:    Patient presents to the hospital with left ankle pain, and bilateral leg weakness.   Patient reports that his symptoms started 2 to 3 days ago when he fell down and rolled his ankle, he went to [facility] where the x-ray did not reveal any fractures but there was concern for possible infection thus he was managed with doxycycline twice daily. He was discharged and he came back to [facility] ER 2 days later with the unresolved left ankle pain, bilateral leg weakness. Past medical history is significant for CAD s/p CABG, COPD, benign bladder tumors, BPH and chronic back pain. He also had a bladder cystoscopy 1 week ago for removal of bladder tumor. He has had 2 bladder masses removed previously which were negative for any malignancy    He denies any fever, nausea, vomiting, chest pain, shortness of breath or any abdominal pain, and dysuria. On evaluation patient is alert and oriented, in no apparent distress. He is afebrile, hemodynamically stable, saturating well on room air. He is complaining of neck pain and back pain but reports that the ankle pain is much improved. XR ANKLE LEFT (MIN 3 VIEWS)   Final Result   No acute osseous or soft tissue abnormality. XR FOOT LEFT (MIN 3 VIEWS)   Final Result   Soft tissue swelling without acute osseous abnormality. XR CHEST PORTABLE   Final Result   No acute cardiopulmonary findings. CT FOOT LEFT WO CONTRAST   Final Result   1. No acute osseous abnormality. Normal midfoot alignment. 2. Nonspecific subcutaneous edema. No drainable fluid collection or soft   tissue gas. CURRENT EVALUATION 11/17/2022  BP (!) 127/92   Pulse 89   Temp 98.2 °F (36.8 °C) (Oral)   Resp 12   Ht 5' 9\" (1.753 m)   Wt 198 lb 6.6 oz (90 kg)   SpO2 91%   BMI 29.30 kg/m²     Afebrile  VS stable    The patient is on 1 L NC today and is experiencing intermittent confusion. He is still complaining of severe back pain to the extent that he cannot bend his legs. He is refusing to be turned.   Takes two people to move him in bed    He underwent a CT of the lumbar spine that revealed severe  lumbar stenosis. No sign of osteomyelitis. Neurosurgery has been consulted and recommend L2-L4 laminectomy    S/p left ankle aspiration 11-12-22  WBC <11,937, many neutrophils and no bacteria seen on direct exam  Moderate calcium pyrophosphate crystals noted  Vancomycin discontinued as this appears to be an acute pseudogout attack    CRP is elevated at 402.1-->283.5    No other acute issues noted    Labs, X rays reviewed: 11/17/2022    BUN: 16  Cr: 0.89    WBC: 17.7-->12.4  Hb: 13.3-->11.1  Plat:  467-->513    CRP: 391.1-->372.7-->402.1      Cultures:  Urine:  11/16/22: pending  Blood:  11-11-22: No growth  11-12-22: No growth  Sputum :    Left ankle bursa aspirate:  11/12/22: No bacteria seen  MRSA Nares:  11/12/22: Not detected    Imaging:     CXR 11-11-22      Left ankle 11-11-22      Discussed with patient, RN, family. I have personally reviewed the past medical history, past surgical history, medications, social history, and family history, and I have updated the database accordingly. Past Medical History:     Past Medical History:   Diagnosis Date    Abnormal EKG     anterior fascicular block    Bladder tumor     CAD (coronary artery disease)     Clinical trial participant at discharge 03/24/2016    disqualified 3/24/2016    COPD (chronic obstructive pulmonary disease) ([facility] [address]     Depression     GERD (gastroesophageal reflux disease)     Hearing loss     History of blood transfusion     Hyperlipidemia     Hypertension 2012    Panic attacks     Prostate hypertrophy     Under care of team     Dr. Juan Enriquez, cardiology    Wellness examination     -PCP seen in Dec. 2019       Past Surgical  History:     Past Surgical History:   Procedure Laterality Date    BLADDER TUMOR EXCISION  01/15/2016    TURB with gyrus    CARDIAC DEFIBRILLATOR PLACEMENT  09/22/2016    Unsafe Pacemaker. [place] 9881 ICD Model # [id number].  RV Lead [employer] [place] SG Model # [id number]    CORONARY ARTERY BYPASS GRAFT  03/27/2016    X 4 Emergent    CORONARY ARTERY BYPASS GRAFT  2016    CYSTOSCOPY      CYSTOSCOPY  01/15/2016    CYSTOSCOPY  10/26/2022    CYSTOSCOPY BLADDER BIOPSY, FULGARATION    CYSTOSCOPY N/A 10/26/2022    CYSTOSCOPY BLADDER BIOPSY, FULGARATION performed by Crys Gage MD at [facility] [address] Right     LIPOMA RESECTION  child    anterior neck    TONSILLECTOMY AND ADENOIDECTOMY  child       Medications:      colchicine  1.2 mg Oral Once    colchicine  0.6 mg Oral BID    polyethylene glycol  17 g Oral Daily    atorvastatin  40 mg Oral Nightly    buPROPion  450 mg Oral QAM    [Held by provider] clopidogrel  75 mg Oral Daily    budesonide-formoterol  2 puff Inhalation BID    furosemide  40 mg Oral Daily    lisinopril  2.5 mg Oral Daily    metoprolol tartrate  25 mg Oral BID    traZODone  150 mg Oral Nightly    vilazodone HCl  20 mg Oral Daily    sodium chloride flush  5-40 mL IntraVENous 2 times per day    enoxaparin  40 mg SubCUTAneous Daily    gabapentin  100 mg Oral TID    sennosides-docusate sodium  2 tablet Oral Daily    ARIPiprazole  10 mg Oral Daily       Social History:     Social History     Socioeconomic History    Marital status:      Spouse name: Not on file    Number of children: 1    Years of education: Not on file    Highest education level: Not on file   Occupational History    Occupation: [employer] man   Tobacco Use    Smoking status: Former     Packs/day: 0.50     Types: Cigars, Cigarettes     Start date: 1966     Quit date: 2021     Years since quittin.0    Smokeless tobacco: Former     Types: Snuff, Chew     Quit date: 3/23/2016   Vaping Use    Vaping Use: Never used   Substance and Sexual Activity    Alcohol use:  Yes     Alcohol/week: 7.0 standard drinks     Types: 7 Cans of beer per week     Comment: occasionally    Drug use: Yes     Types: Marijuana [place])    Sexual activity: Not on file   Other Topics Concern    Not on file   Social History Narrative    Not on file     Social Determinants of Health     Financial Resource Strain: Low Risk     Difficulty of Paying Living Expenses: Not hard at all   Food Insecurity: No Food Insecurity    Worried About [address] in the Last Year: Never true    [address] [facility] [address] in the Last Year: Never true   Transportation Needs: No Transportation Needs    Lack of Transportation (Medical): No    Lack of Transportation (Non-Medical): No   Physical Activity: Insufficiently Active    Days of Exercise per Week: 3 days    Minutes of Exercise per Session: 30 min   Stress: Stress Concern Present    Feeling of Stress : Very much   Social Connections: Socially Isolated    Frequency of Communication with Friends and Family: Never    Frequency of Social Gatherings with Friends and Family: More than three times a week    Attends Denominational Services: Never    Active Member of Clubs or Organizations: No    Attends Club or Organization Meetings: Never    Marital Status:    Intimate Partner Violence: Not At Risk    Fear of Current or Ex-Partner: No    Emotionally Abused: No    Physically Abused: No    Sexually Abused: No   Housing Stability: Unknown    Unable to Pay for Housing in the Last Year: No    Number of Places Lived in the Last Year: Not on file    Unstable Housing in the Last Year: No       Family History:     Family History   Problem Relation Age of Onset    Other Mother         blind    Heart Surgery Mother     Cancer Father         leukemia        Allergies:   Patient has no known allergies. Review of Systems:   Constitutional: No fevers or chills. No systemic complaints  Head: No headaches  Eyes: No double vision or blurry vision. No conjunctival inflammation. ENT: No sore throat or runny nose. . No hearing loss, tinnitus or vertigo. Cardiovascular: No chest pain or palpitations. No shortness of breath. No TALBTO  Lung: No shortness of breath or cough.  No sputum production  Abdomen: No nausea, vomiting, diarrhea, or abdominal pain. [place] No cramps. Genitourinary: No increased urinary frequency, or dysuria. No hematuria. No suprapubic or CVA pain  Musculoskeletal: left ankle pain, bilateral lower extremity pain and weakness  Neurologic: No headache, weakness, numbness, or tingling. Integument: No rash, no ulcers. Psychiatric: No depression. Endocrine: No polyuria, no polydipsia, no polyphagia. Physical Examination :   Patient Vitals for the past 8 hrs:   BP Temp Temp src Pulse Resp SpO2 Weight   11/17/22 0700 (!) 127/92 98.2 °F (36.8 °C) Oral 89 12 -- --   11/17/22 0343 -- -- -- -- -- -- 198 lb 6.6 oz (90 kg)   11/17/22 0315 134/76 98.5 °F (36.9 °C) Oral 84 20 91 % --   11/17/22 0015 116/64 98.5 °F (36.9 °C) Oral 73 18 100 % --     General Appearance: Awake, alert, and in no apparent distress  Head:  Normocephalic, no trauma  Eyes: Pupils equal, round, reactive to light and accommodation; extraocular movements intact; sclera anicteric; conjunctivae pink. No embolic phenomena. ENT: Oropharynx clear, without erythema, exudate, or thrush. No tenderness of sinuses. Mouth/throat: mucosa pink and moist. No lesions. Dentition in good repair. Neck:Supple, without lymphadenopathy. Thyroid normal, No bruits. Pulmonary/Chest: Clear to auscultation, without wheezes, rales, or rhonchi. No dullness to percussion. Cardiovascular: Regular rate and rhythm without murmurs, rubs, or gallops. Abdomen: Soft, non tender. Bowel sounds normal. No organomegaly  All four Extremities: no swelling, tenderness on palapation of left ankle, tenderness on palpation of lower extremity, restricted ROM left ankle, no warmth or redness. Neurologic: No gross sensory or motor deficits. Skin: Warm and dry with good turgor. No signs of peripheral arterial or venous insufficiency. No ulcerations. No open wounds.     Medical Decision Making -Laboratory:   I have independently reviewed/ordered the following labs:    CBC with Differential: Recent Labs     11/16/22 0624   WBC 12.4*   HGB 11.1*   HCT 34.4*   *   LYMPHOPCT 9*   MONOPCT 9*       BMP:   Recent Labs     11/16/22 0624      K 3.4*   CL 99   CO2 26   BUN 25*   CREATININE 0.80   MG 2.1     Hepatic Function Panel:   No results for input(s): PROT, LABALBU, BILIDIR, IBILI, BILITOT, ALKPHOS, ALT, AST in the last 72 hours. No results for input(s): RPR in the last 72 hours. No results for input(s): HIV in the last 72 hours. No results for input(s): BC in the last 72 hours. Lab Results   Component Value Date/Time    MUCUS 1+ 11/16/2022 05:30 AM    RBC 4.65 11/16/2022 06:24 AM    RBC 4.85 02/13/2012 11:22 AM    TRICHOMONAS NOT REPORTED 03/26/2016 02:30 PM    WBC 12.4 11/16/2022 06:24 AM    YEAST NOT REPORTED 03/26/2016 02:30 PM    TURBIDITY Clear 11/16/2022 05:30 AM     Lab Results   Component Value Date/Time    CREATININE 0.80 11/16/2022 06:24 AM    GLUCOSE 92 11/16/2022 06:24 AM    GLUCOSE 105 02/13/2012 11:22 AM       Medical Decision Making-Imaging:     Narrative   EXAMINATION:   THREE XRAY VIEWS OF THE LEFT FOOT       11/12/2022 6:51 am       COMPARISON:   None. HISTORY:   ORDERING SYSTEM PROVIDED HISTORY: infection   TECHNOLOGIST PROVIDED HISTORY:   infection       FINDINGS:   There is no acute osseous abnormality. The joint spaces are maintained. There is soft tissue swelling. Impression   Soft tissue swelling without acute osseous abnormality. Narrative   EXAMINATION:   THREE XRAY VIEWS OF THE LEFT ANKLE       11/12/2022 6:51 am       COMPARISON:   None. HISTORY:   ORDERING SYSTEM PROVIDED HISTORY: infection   TECHNOLOGIST PROVIDED HISTORY:   infection       FINDINGS:   There is no acute osseous abnormality. The joint spaces are maintained. The   surrounding soft tissues are unremarkable. Impression   No acute osseous or soft tissue abnormality.      Narrative   EXAMINATION:   ONE XRAY VIEW OF THE CHEST       11/11/2022 7:51 pm COMPARISON:   05/17/2016       HISTORY:   ORDERING SYSTEM PROVIDED HISTORY: Sepsis   TECHNOLOGIST PROVIDED HISTORY:   Sepsis   Reason for Exam: upr,leg pain,sepsis       Initial encounter       FINDINGS:   Status post median sternotomy. Cardiac AICD is noted. No focal   consolidation, pleural effusion or pneumothorax. The cardiomediastinal   silhouette is stable. No overt pulmonary edema. The osseous structures are   stable. Impression   No acute cardiopulmonary findings. Narrative   EXAMINATION:   CT OF THE LEFT FOOT WITHOUT CONTRAST 11/11/2022 6:36 pm       TECHNIQUE:   CT of the left foot was performed without the administration of intravenous   contrast.  Multiplanar reformatted images are provided for review. Automated   exposure control, iterative reconstruction, and/or weight based adjustment of   the mA/kV was utilized to reduce the radiation dose to as low as reasonably   achievable. COMPARISON:   None. HISTORY   ORDERING SYSTEM PROVIDED HISTORY: possible tarsal avulsion seen on prior xray   TECHNOLOGIST PROVIDED HISTORY:   possible tarsal avulsion seen on prior xray   Decision Support Exception - unselect if not a suspected or confirmed   emergency medical condition->Emergency Medical Condition (MA)       FINDINGS:   The distal tibia and fibula are intact. No syndesmotic widening. The ankle   mortise is intact. Mild chondrocalcinosis of the tibiotalar joint. The   tibial plafond and talar dome are normal in appearance. The subtalar joint   is unremarkable. The talonavicular and calcaneocuboid joints are   unremarkable. The naviculocuneiform articulations are unremarkable. Mild   2nd tarsometatarsal degenerative changes. Normal midfoot alignment is   maintained. No Lisfranc interval widening. Mild chondrocalcinosis of the   midfoot. The metatarsophalangeal and interphalangeal joints are intact. No   fracture or dislocation identified.   No osseous erosion or periosteal   reaction. Mild atherosclerotic vascular calcifications. The visualized tendons are   grossly intact. Dorsal mid and forefoot subcutaneous edema. Circumferential   subcutaneous edema about the ankle. No soft tissue gas or drainable fluid   collection. Impression   1. No acute osseous abnormality. Normal midfoot alignment. 2. Nonspecific subcutaneous edema. No drainable fluid collection or soft   tissue gas. Narrative   EXAMINATION:   CT OF THE LUMBAR SPINE WITH CONTRAST  11/14/2022 11:58 am       TECHNIQUE:   CT of the lumbar spine was performed with the administration of intravenous   contrast. Multiplanar reformatted images are provided for review. Automated   exposure control, iterative reconstruction, and/or weight based adjustment of   the mA/kV was utilized to reduce the radiation dose to as low as reasonably   achievable. COMPARISON:   Lumbar spine radiographs 11/08/2008       HISTORY:   ORDERING SYSTEM PROVIDED HISTORY: concern for slipped disc as well as   possible mass/psoas abbcess or lytic/blasic lesions suggesting underlying   maligancy contributing to possible pathological fracture. TECHNOLOGIST PROVIDED HISTORY:   concern for slipped disc as well as possible mass/psoas abbcess or   lytic/blasic lesions suggesting underlying maligancy contributing to possible   pathological fracture. FINDINGS:   BONES/ALIGNMENT:  There is a levoconvex lumbar scoliosis. There is   multilevel disc space narrowing and degenerative endplate sclerosis   throughout the lower thoracic and lumbar spine. There is no spondylolysis. No lytic or blastic osseous lesions are identified. SOFT TISSUES: There is no paraspinal mass identified. There is no abnormal   fluid collection identified. Severe atherosclerotic calcifications are   present within the abdominal aorta. L1-L2: There is a disc bulge and osteophyte formation.   There is bilateral   facet arthropathy. There is severe left neural foraminal narrowing. No   significant spinal canal stenosis or right neural foraminal narrowing is   present. L2-L3: There is a disc bulge, facet arthropathy and thickening of the   ligamentum flavum. There is severe left neural foraminal narrowing. There   is no significant right neural foraminal narrowing. Mild spinal canal   stenosis is present. L3-L4: There is a disc bulge, facet arthropathy and thickening of the   ligamentum flavum. , severe right and moderate left neural foraminal   narrowing. There is moderate spinal canal stenosis. L4-L5: There is a disc bulge, facet arthropathy and thickening of the   ligamentum flavum. There is severe spinal canal stenosis, severe right and   mild left neural foraminal narrowing. L5-S1: There is a disc bulge, posterior osteophyte formation and facet   arthropathy. There is no significant spinal canal stenosis. Severe   bilateral neural foraminal narrowing is present. Impression   1. No findings to suggest acute discitis/osteomyelitis. 2. No abscess identified. 3. Severe multilevel degenerative changes of the lumbar spine, as described   above, but most pronounced at L4-5 where there is severe spinal canal   stenosis, severe right and mild left neural foraminal narrowing. Narrative   EXAMINATION:   CT OF THE LUMBAR SPINE WITH CONTRAST  11/15/2022 1:38 pm       TECHNIQUE:   CT of the lumbar spine was performed with the administration of intravenous   contrast. Multiplanar reformatted images are provided for review. Automated   exposure control, iterative reconstruction, and/or weight based adjustment of   the mA/kV was utilized to reduce the radiation dose to as low as reasonably   achievable.        COMPARISON:   None       HISTORY:   ORDERING SYSTEM PROVIDED HISTORY: obtain after myelogram for lumbar stenosis   TECHNOLOGIST PROVIDED HISTORY:   obtain after myelogram for lumbar stenosis   Reason for Exam: obtain after myelogram for lumbar stenosis       FINDINGS:   BONES/ALIGNMENT:  There is levocurvature of the lumbar spine. The vertebral   body heights are maintained. There is no spondylolisthesis. SOFT TISSUES: The posterior paraspinal soft tissues are unremarkable. The   visualized abdominal structures are unremarkable. There is uncomplicated   colonic diverticulosis. Conus is normal in caliber and terminates at the L2   level. The cauda equina is unremarkable. L1-L2: There is a circumferential disc bulge with facet hypertrophy. There is   no canal stenosis. There is mild right and moderate left foraminal narrowing. L2-L3: There is a circumferential disc bulge with facet hypertrophy. There   is canal stenosis measuring 7 mm in AP dimension. There is moderate   bilateral foraminal narrowing. L3-L4: There is a circumferential disc bulge with facet hypertrophy. There   is canal stenosis measuring 9 mm in AP dimension. There is severe bilateral   foraminal narrowing. L4-L5: There is a circumferential disc bulge with facet and ligamentous   hypertrophy. There is canal stenosis measuring 8 mm in AP dimension. There   is severe bilateral foraminal narrowing. L5-S1: There is a circumferential disc bulge. There is no canal stenosis. There is severe bilateral foraminal narrowing. Impression   Multilevel degenerative disc disease with associated multilevel degenerative   facet hypertrophy resulting in canal stenosis most severe at L2-3. Bilateral foraminal narrowing as described above.                Medical Decision Pcupjb-[id number]-Asmsl:       Medical Decision Making-Other:     Note:  Labs, medications, radiologic studies were reviewed with personal review of films  Large amounts of data were reviewed  Discussed with nursing Staff, Discharge planner  Infection Control and Prevention measures reviewed  All prior entries were reviewed  Administer medications as ordered  Prognosis: Fair  Discharge planning reviewed      Thank you for allowing us to participate in the care of this patient. Please call with questions. Saurav Tran, APRN - CNP    ATTESTATION:    I have discussed the case, including pertinent history and exam findings with the APRN. I have evaluated the  History, physical findings and pictures of the patient and the key elements of the encounter have been performed by me. I have reviewed the laboratory data, other diagnostic studies and discussed them with the APRN. I have updated the medical record where necessary. I agree with the assessment, plan and orders as documented by the APRN.     Nano Harrell MD.      Pager: [phone] - Office: [phone]

## 2022-11-18 VITALS
DIASTOLIC BLOOD PRESSURE: 70 MMHG | HEART RATE: 78 BPM | HEIGHT: 69 IN | OXYGEN SATURATION: 95 % | TEMPERATURE: 98.6 F | BODY MASS INDEX: 29.62 KG/M2 | SYSTOLIC BLOOD PRESSURE: 116 MMHG | RESPIRATION RATE: 18 BRPM | WEIGHT: 199.96 LBS

## 2022-11-18 PROCEDURE — 6370000000 HC RX 637 (ALT 250 FOR IP): Performed by: NURSE PRACTITIONER

## 2022-11-18 PROCEDURE — 6360000002 HC RX W HCPCS: Performed by: NURSE PRACTITIONER

## 2022-11-18 PROCEDURE — 6360000002 HC RX W HCPCS: Performed by: STUDENT IN AN ORGANIZED HEALTH CARE EDUCATION/TRAINING PROGRAM

## 2022-11-18 PROCEDURE — 99239 HOSP IP/OBS DSCHRG MGMT >30: CPT | Performed by: STUDENT IN AN ORGANIZED HEALTH CARE EDUCATION/TRAINING PROGRAM

## 2022-11-18 PROCEDURE — 94761 N-INVAS EAR/PLS OXIMETRY MLT: CPT

## 2022-11-18 PROCEDURE — APPSS30 APP SPLIT SHARED TIME 16-30 MINUTES: Performed by: PHYSICIAN ASSISTANT

## 2022-11-18 PROCEDURE — 94640 AIRWAY INHALATION TREATMENT: CPT

## 2022-11-18 PROCEDURE — 99232 SBSQ HOSP IP/OBS MODERATE 35: CPT | Performed by: NEUROLOGICAL SURGERY

## 2022-11-18 PROCEDURE — 6370000000 HC RX 637 (ALT 250 FOR IP): Performed by: INTERNAL MEDICINE

## 2022-11-18 PROCEDURE — 99232 SBSQ HOSP IP/OBS MODERATE 35: CPT | Performed by: INTERNAL MEDICINE

## 2022-11-18 RX ADMIN — ARIPIPRAZOLE 10 MG: 10 TABLET ORAL at 08:51

## 2022-11-18 RX ADMIN — ENOXAPARIN SODIUM 40 MG: 100 INJECTION SUBCUTANEOUS at 08:46

## 2022-11-18 RX ADMIN — BUPROPION HYDROCHLORIDE 450 MG: 150 TABLET, EXTENDED RELEASE ORAL at 08:45

## 2022-11-18 RX ADMIN — VILAZODONE HYDROCHLORIDE 20 MG: 20 TABLET ORAL at 08:47

## 2022-11-18 RX ADMIN — COLCHICINE 0.6 MG: 0.6 TABLET, FILM COATED ORAL at 08:45

## 2022-11-18 RX ADMIN — BUDESONIDE AND FORMOTEROL FUMARATE DIHYDRATE 2 PUFF: 160; 4.5 AEROSOL RESPIRATORY (INHALATION) at 07:50

## 2022-11-18 RX ADMIN — ACETAMINOPHEN 650 MG: 325 TABLET ORAL at 08:50

## 2022-11-18 RX ADMIN — KETOROLAC TROMETHAMINE 30 MG: 15 INJECTION, SOLUTION INTRAMUSCULAR; INTRAVENOUS at 13:43

## 2022-11-18 RX ADMIN — METOPROLOL TARTRATE 25 MG: 25 TABLET ORAL at 08:44

## 2022-11-18 RX ADMIN — GABAPENTIN 100 MG: 100 CAPSULE ORAL at 13:45

## 2022-11-18 RX ADMIN — FUROSEMIDE 40 MG: 40 TABLET ORAL at 08:45

## 2022-11-18 RX ADMIN — GABAPENTIN 100 MG: 100 CAPSULE ORAL at 08:46

## 2022-11-18 RX ADMIN — LISINOPRIL 2.5 MG: 2.5 TABLET ORAL at 08:45

## 2022-11-18 ASSESSMENT — PAIN DESCRIPTION - DESCRIPTORS
DESCRIPTORS: BURNING;STABBING
DESCRIPTORS: ACHING

## 2022-11-18 ASSESSMENT — PAIN DESCRIPTION - LOCATION
LOCATION: BACK;LEG
LOCATION: LEG

## 2022-11-18 ASSESSMENT — PAIN - FUNCTIONAL ASSESSMENT: PAIN_FUNCTIONAL_ASSESSMENT: PREVENTS OR INTERFERES SOME ACTIVE ACTIVITIES AND ADLS

## 2022-11-18 ASSESSMENT — PAIN SCALES - GENERAL
PAINLEVEL_OUTOF10: 5
PAINLEVEL_OUTOF10: 7
PAINLEVEL_OUTOF10: 6

## 2022-11-18 ASSESSMENT — PAIN DESCRIPTION - ORIENTATION
ORIENTATION: LEFT
ORIENTATION: RIGHT;LEFT

## 2022-11-18 NOTE — CARE COORDINATION
Transportation request faxed to [address] [place] [address]. AVS faxed to [employer]. HENS completed. Patient updated    [phone] spoke to Katherine from [facility] They are able to send someone now to  patient. Notified [place] from [email or web address].  Patient notified and agreeable    Discharge Report    [facility])  Clinical Case Management Department  Written by: Nemo Ashby RN    Patient Name: Ghazala Reddy  Attending Provider: Addy Seals MD  Admit Date: 2022  3:49 PM  MRN: [id number]  Account: [de-identified]                     : 1950  Discharge Date: 2022      Disposition: [facility]    Nemo Ashby RN

## 2022-11-18 NOTE — PROGRESS NOTES
[facility]  Office: [address], DO, Tamanna Talbert, DO, Marni Crowley, DO, Howard Soto Blood, DO, Tom Ryan MD, Shayna Waller MD, Altaf Ferreira MD, Vijay Encinas MD,  Naif Dodson MD, Luiza Montero MD, Courtney Jerome DO, Cookie Christensen MD,  Elvie Farah MD, Herman Gamez MD, Anabela Elizabeth DO, Zaynab Trejo MD, Tracey Robledo MD, Tg Herrera DO, Yumiko Rodriguez MD, Mone Baumann MD, Cleopatra Lyles MD, Brittaney Alcantar MD, Niki Cassidy DO, Milena Roa MD, Francisca Ogden MD, Armando Ochoa, CNP,  Mendoza Huber, CNP, Norma Marcelino, CNP, Lily Cruz, CNP,  Mccoy Baumgarten, [facility], Kandi Hassan, CNP, Julianna Infante, CNP, Ronaldo Bae, CNP, Alyse Cain, CNP, Jennifer Rose, CNP, Anayeli Bassett [employer], Anatoly Navarrete, CNS, Epi Church, [facility], Haydee William, CNP, Venia Dandy, CNP, Tim Roach, [facility]         Jerardo Rojas [address]    Progress Note    11/18/2022    12:09 PM    Name:   Zaire Higuera  MRN:     [id number]     Acct:      [de-identified]   Room:   2005/2005-01  IP Day:  7  Admit Date:  11/11/2022  3:49 PM    PCP:   Marta Rush MD  Code Status:  Full Code    Subjective:     C/C:   Chief Complaint   Patient presents with    Ankle Pain     Interval History Status: not changed. Much more lucid  Polypharmacy reduced yesterday - atarax was not helping  OK to DC today to SNF  Declined surgery and he is AOx3    Brief History: This is a 70year old male who presents to our hospital with complaints of left foot pain. CT of his left foot showed no acute osseous abnormality and normal midfoot alignment but did show subcutaneous edema without any drainable  fluid collection or soft tissue gas. Pt was febrile with Tmax 100.8, with WBC 17 and left shift. There was concern for septic arthritis.  Orthopedic surgery was consulted, he has aspiration of joint which showed white count 11,937, 96% neutrophil count. Gram stain/culture and crystals were ordered and pending: He was seen by Infectious disease and started him on vancomycin. A CT with contrast of his lumbar spine was done due to leg weakness and concern for possible abscess which showed no evidence of abscess    He has also had subsequent imaging from neurosurgery including IR myelogram and has severe canal stenosis but does not appear to be candidate for surgical intervention    Canal stenosis most severe @ L2-L3  Severe bilateral foraminal narrowing    Review of Systems:     Constitutional:  negative for chills, fevers, sweats  Respiratory:  negative for cough, dyspnea on exertion, shortness of breath, wheezing  Cardiovascular:  negative for chest pain, chest pressure/discomfort, lower extremity edema, palpitations  Gastrointestinal:  negative for abdominal pain, constipation, diarrhea, nausea, vomiting  Neurological:  negative for dizziness, headache, he is complaining of bilateral lower extremity leg weakness no urinary tension  EXT: admits to severe pain in left foot making it difficult to ambulate extremity     Medications:      Allergies:  No Known Allergies    Current Meds:   Scheduled Meds:    colchicine  1.2 mg Oral Once    colchicine  0.6 mg Oral BID    polyethylene glycol  17 g Oral Daily    atorvastatin  40 mg Oral Nightly    buPROPion  450 mg Oral QAM    [Held by provider] clopidogrel  75 mg Oral Daily    budesonide-formoterol  2 puff Inhalation BID    furosemide  40 mg Oral Daily    lisinopril  2.5 mg Oral Daily    metoprolol tartrate  25 mg Oral BID    traZODone  150 mg Oral Nightly    vilazodone HCl  20 mg Oral Daily    sodium chloride flush  5-40 mL IntraVENous 2 times per day    enoxaparin  40 mg SubCUTAneous Daily    gabapentin  100 mg Oral TID    sennosides-docusate sodium  2 tablet Oral Daily    ARIPiprazole  10 mg Oral Daily     Continuous Infusions:    sodium chloride 25 mL/hr at 11/13/22 0213     PRN Meds: ketorolac, potassium chloride **OR** potassium alternative oral replacement **OR** potassium chloride, magnesium sulfate, sodium chloride flush, sodium chloride, acetaminophen **OR** acetaminophen, albuterol    Data:     Past Medical History:   has a past medical history of Abnormal EKG, Bladder tumor, CAD (coronary artery disease), Clinical trial participant at discharge, COPD (chronic obstructive pulmonary disease) ([address] Depression, GERD (gastroesophageal reflux disease), Hearing loss, History of blood transfusion, Hyperlipidemia, Hypertension, Panic attacks, Prostate hypertrophy, Under care of team, and Wellness examination. Social History:   reports that he quit smoking about a year ago. His smoking use included cigars and cigarettes. He started smoking about 56 years ago. He smoked an average of .5 packs per day. He quit smokeless tobacco use about 6 years ago. His smokeless tobacco use included snuff and chew. He reports current alcohol use of about 7.0 standard drinks per week. He reports current drug use. Drug: Marijuana Pedro Pablo Turner). Family History:   Family History   Problem Relation Age of Onset    Other Mother         blind    Heart Surgery Mother     Cancer Father         leukemia       Vitals:  /72   Pulse 78   Temp 98.6 °F (37 °C) (Oral)   Resp 17   Ht 5' 9\" (1.753 m)   Wt 199 lb 15.3 oz (90.7 kg)   SpO2 95%   BMI 29.53 kg/m²   Temp (24hrs), Av.4 °F (36.9 °C), Min:97.8 °F (36.6 °C), Max:98.9 °F (37.2 °C)    No results for input(s): POCGLU in the last 72 hours. I/O (24Hr):     Intake/Output Summary (Last 24 hours) at 2022 1209  Last data filed at 2022 0910  Gross per 24 hour   Intake 960 ml   Output --   Net 960 ml       Labs:  Hematology:  Recent Labs     22  0624 22  0341   WBC 12.4*  --    RBC 4.65  --    HGB 11.1*  --    HCT 34.4*  --    MCV 74.0*  --    MCH 23.9*  --    MCHC 32.3  --    RDW 17.2*  --    *  --    MPV 10.5  --    CRP  --  283.5* Chemistry:  Recent Labs     11/16/22  0624      K 3.4*   CL 99   CO2 26   GLUCOSE 92   BUN 25*   CREATININE 0.80   MG 2.1   ANIONGAP 12   LABGLOM >60   CALCIUM 9.3     No results for input(s): PROT, LABALBU, LABA1C, M8VIZZN, N0BBRBL, FT4, TSH, AST, ALT, LDH, GGT, ALKPHOS, LABGGT, BILITOT, BILIDIR, AMMONIA, AMYLASE, LIPASE, LACTATE, CHOL, HDL, LDLCHOLESTEROL, CHOLHDLRATIO, TRIG, VLDL, LNW71DU, PHENYTOIN, PHENYF, URICACID, POCGLU in the last 72 hours. ABG:  Lab Results   Component Value Date/Time    POCPH 7.41 03/28/2016 05:31 AM    POCPCO2 37 03/28/2016 05:31 AM    POCPO2 105 03/28/2016 05:31 AM    POCHCO3 23.3 03/28/2016 05:31 AM    NBEA 1 03/28/2016 05:31 AM    PBEA NOT REPORTED 03/28/2016 05:31 AM    SYK3DPM 24 03/28/2016 05:31 AM    QCXY1KEU 98 03/28/2016 05:31 AM    FIO2 40.0 03/28/2016 05:31 AM     Lab Results   Component Value Date/Time    SPECIAL LEFT AC 3ML 11/12/2022 02:19 AM    SPECIAL LEFT FOREARM 2ML 11/12/2022 02:19 AM     Lab Results   Component Value Date/Time    CULTURE NO GROWTH 11/16/2022 05:30 AM       Radiology:  XR ANKLE LEFT (MIN 3 VIEWS)    Result Date: 11/12/2022  No acute osseous or soft tissue abnormality. XR FOOT LEFT (MIN 3 VIEWS)    Result Date: 11/12/2022  Soft tissue swelling without acute osseous abnormality. XR CHEST PORTABLE    Result Date: 11/11/2022  No acute cardiopulmonary findings. CT FOOT LEFT WO CONTRAST    Result Date: 11/11/2022  1. No acute osseous abnormality. Normal midfoot alignment. 2. Nonspecific subcutaneous edema. No drainable fluid collection or soft tissue gas.        Physical Examination:        General appearance:  alert, cooperative and no distress  Mental Status:  oriented to self  Lungs:  clear to auscultation bilaterally, normal effort  Heart:  regular rate and rhythm, no murmur  Abdomen:  soft, nontender, nondistended, normal bowel sounds, no masses, hepatomegaly, splenomegaly  Extremities:  no edema, redness, tenderness in the calves. There is  pain with palpation of left leg, he is restricted with active and passive movement. Muscle strength 4 out of 5 bilaterally  Skin:  no gross lesions, rashes, induration there is some erythema noted on left leg    Assessment:        Hospital Problems             Last Modified POA    * (Principal) Pseudogout of ankle 11/14/2022 Yes    Sepsis ([address] 11/12/2022 Yes    Acute left ankle pain 11/12/2022 Yes    Left leg weakness 11/12/2022 Yes    Primary hypertension 11/12/2022 Yes    Ischemic cardiomyopathy 11/12/2022 Yes    Microcytosis 11/12/2022 Yes    Class 1 obesity due to excess calories with serious comorbidity and body mass index (BMI) of 31.0 to 31.9 in adult 11/12/2022 Yes    COPD (chronic obstructive pulmonary disease) ([address] 11/12/2022 Yes    Monoarticular arthritis 11/12/2022 Yes    Fall 11/12/2022 Yes    SIRS (systemic inflammatory response syndrome) ([facility] 11/14/2022 Yes    Spinal stenosis of lumbar region at multiple levels 11/16/2022 Yes    Pseudogout involving multiple joints 11/16/2022 Yes    Hyperlipidemia 11/12/2022 Yes    S/P cardiac cath- Multivessel CAD 3/24/16-Dr. winters 11/12/2022 Yes    Acute hypokalemia 11/12/2022 Yes    S/P CABG x 4 11/12/2022 Yes      Plan:        Ankle pain due to Pseudogout:  Body fluid positive for Calcium pyrophosphate crystals. Start Colchicine. Outpatient orthopedic surgery evaluation for possible steroid injection. Stop abx THERE IS NO EVIDENCE OF SEPSIS  Altered mentation today likely toxic metabolic encephalopathy with underlying psychosis: Patient is on multiple psychotropic agents, he has been getting Roxicodone 5 times a day, as well as Atarax  Bilateral leg weakness: Myelogram is pending will likely need outpatient follow-up  Microcytosis without anemia: Tsat: 7%. continue iron supplementation. BPH: No retention. History of ischemic cardiomyopathy with EF of 20 to 25% on echo from 2016: Currently appears compensated. He has AICD in place. Continue  home lasix, lisinopril and BB. History of coronary artery disease s/p CABG x4: Hold plavix incase pt needs surgical intervention continue lipitor. COPD: Stable. No exacerbation  Primary HTN: stable.    Toxic thyroid nodule has follow-up at [facility]: Please dc patient today    Merlyn Huertas MD  11/18/2022  12:09 PM

## 2022-11-18 NOTE — PROGRESS NOTES
Comprehensive Nutrition Assessment    Type and Reason for Visit:  Initial (LOS)    Nutrition Recommendations/Plan:   Continue current diet. Will provide Ensure Enlive ONS x 2 per day. Encourage/monitor PO intakes as tolerated. Monitor labs, weights, and plan of care. Malnutrition Assessment:  Malnutrition Status: At risk for malnutrition (11/18/22 1454)    Context:  Acute Illness     Findings of the 6 clinical characteristics of malnutrition:  Energy Intake:  75% or less of estimated energy requirements for 7 or more days  Weight Loss: No significant weight loss - Reported intentional 30 lb weight loss x past ~6 months. Body Fat Loss:  No significant body fat loss   Muscle Mass Loss:  No significant muscle mass loss  Fluid Accumulation:  Moderate to Severe Extremities   Strength:  Not Performed    Nutrition Assessment:    Chart reviewed/pt seen for length of stay. Pt admitted with ankle pain after rolling ankle. PMH: CAD, COPD, GERD, HTN. Pt reports h/o 30 lb weight loss x past 6 months by eliminating desserts and candy. Reports having a decreased appetite currently. Pt ate 1-25% of breakfast and lunch today but has been taking fluids well. Will provide ONS with meals and monitor PO intakes. Labs/Meds reviewed. Nutrition Related Findings:    Labs/Meds reviewed. Last BM 11/18. Wound Type: None       Current Nutrition Intake & Therapies:    Average Meal Intake: 1-25%  Average Supplements Intake: None Ordered  ADULT DIET; Regular    Anthropometric Measures:  Height: 5' 9\" (175.3 cm)  Ideal Body Weight (IBW): 160 lbs (73 kg)    Admission Body Weight: 203 lb 4.2 oz (92.2 kg)  Current Body Weight: 199 lb 15.3 oz (90.7 kg), 125 % IBW. Weight Source: Bed Scale  Current BMI (kg/m2): 29.5  Usual Body Weight: 209 lb (94.8 kg)  % Weight Change (Calculated): -4.3                    BMI Categories: Overweight (BMI 25.0-29. 9)    Estimated Daily Nutrient Needs:  Energy Requirements Based On: Kcal/kg  Weight Used for Energy Requirements: Current  Energy (kcal/day): 2168-6028 kcals/day  Weight Used for Protein Requirements: Ideal  Protein (g/day):  gm pro/day  Method Used for Fluid Requirements: ml/Kg  Fluid (ml/day): 25 mL/kg = 2949-6942 mL/day or per MD    Nutrition Diagnosis:   Inadequate oral intake related to  (decreased appetite) as evidenced by intake 0-25% (variable PO intakes; need for ONS)    Nutrition Interventions:   Food and/or Nutrient Delivery: Continue Current Diet, Start Oral Nutrition Supplement  Nutrition Education/Counseling: No recommendation at this time  Coordination of Nutrition Care: Continue to monitor while inpatient       Goals:  Previous Goal Met:  (Goal Set)  Goals: PO intake 50% or greater, prior to discharge       Nutrition Monitoring and Evaluation:   Behavioral-Environmental Outcomes: None Identified  Food/Nutrient Intake Outcomes: Food and Nutrient Intake, Supplement Intake  Physical Signs/Symptoms Outcomes: Biochemical Data, GI Status, Fluid Status or Edema, Nutrition Focused Physical Findings, Skin, Weight    Discharge Planning:     Too soon to determine     Markell Olguin, [address] [place] [address],   Contact: [phone]

## 2022-11-18 NOTE — PLAN OF CARE
Problem: Discharge Planning  Goal: Discharge to home or other facility with appropriate resources  Outcome: Completed     Problem: Pain  Goal: Verbalizes/displays adequate comfort level or baseline comfort level  Outcome: Completed     Problem: Safety - Adult  Goal: Free from fall injury  Outcome: Completed     Problem: ABCDS Injury Assessment  Goal: Absence of physical injury  Outcome: Completed     Problem: Respiratory - Adult  Goal: Achieves optimal ventilation and oxygenation  11/18/2022 1557 by Bobby Sanchez RN  Outcome: Completed  11/18/2022 0902 by Anju العلي RCP  Outcome: Progressing

## 2022-11-18 NOTE — PROGRESS NOTES
[facility]  Progress Note                   Date:   11/18/2022  Patient name: Breonna Bermudez  Date of admission:  11/11/2022  3:49 PM  MRN:   [id number]  YOB: 1950  PCP: Samuel Pettit MD    Reason for Admission: Sepsis ([facility] [A41.9]  Sepsis without acute organ dysfunction, due to unspecified organism ([facility] [A41.9]    Subjective:       Clinical Changes /Abnormalities: seen & examined alone in room. Denies any cp or sob. States he is not having surgery now because he can move his legs. Labs, vitals, & tele reviewed     Review of Systems    Medications:   Scheduled Meds:   colchicine  1.2 mg Oral Once    colchicine  0.6 mg Oral BID    polyethylene glycol  17 g Oral Daily    atorvastatin  40 mg Oral Nightly    buPROPion  450 mg Oral QAM    [Held by provider] clopidogrel  75 mg Oral Daily    budesonide-formoterol  2 puff Inhalation BID    furosemide  40 mg Oral Daily    lisinopril  2.5 mg Oral Daily    metoprolol tartrate  25 mg Oral BID    traZODone  150 mg Oral Nightly    vilazodone HCl  20 mg Oral Daily    sodium chloride flush  5-40 mL IntraVENous 2 times per day    enoxaparin  40 mg SubCUTAneous Daily    gabapentin  100 mg Oral TID    sennosides-docusate sodium  2 tablet Oral Daily    ARIPiprazole  10 mg Oral Daily     Continuous Infusions:   sodium chloride 25 mL/hr at 11/13/22 0213     CBC:   Recent Labs     11/16/22  0624   WBC 12.4*   HGB 11.1*   *     BMP:    Recent Labs     11/16/22  0624      K 3.4*   CL 99   CO2 26   BUN 25*   CREATININE 0.80   GLUCOSE 92     Hepatic:No results for input(s): AST, ALT, ALB, BILITOT, ALKPHOS in the last 72 hours. Troponin: No results for input(s): TROPHS in the last 72 hours. BNP: No results for input(s): BNP in the last 72 hours. Lipids: No results for input(s): CHOL, HDL in the last 72 hours. Invalid input(s): LDLCALCU  INR: No results for input(s): INR in the last 72 hours.     Objective:   Vitals: /76   Pulse 73 Temp 98.4 °F (36.9 °C) (Oral)   Resp 20   Ht 5' 9\" (1.753 m)   Wt 199 lb 15.3 oz (90.7 kg)   SpO2 94%   BMI 29.53 kg/m²   General appearance: alert and cooperative with exam  HEENT: Head: Normocephalic, no lesions, without obvious abnormality. Neck:no JVD, trachea midline, no adenopathy  Lungs: Clear to auscultation  Heart: Regular rate and rhythm, s1/s2 auscultated, no murmurs  Abdomen: soft, non-tender, bowel sounds active  Extremities: no edema  Neurologic: not done    ECHO 03/24/16: EF 20-25%. Trivial AI. Mild MR/TR. RVSP 57.      Echo 11/15/2022:  Normal LV size, mildly increased LV wall thickness  Hypokinetic anteroseptal wall  EF 55%  RV appears mildly dilated, function appears normal  ICD leads noted in the Rt heart chambers  LA appears dilated  No obvious significant structural valvular abnormality note  No significant pericardial effusion seen  No obvious evidence of endocarditis noted     CATH 03/24/16: MV CAD. EF 30%        Cardiac cath 3/24/3016: LM nml, LAD 80%, LCX 40%, Ramus 80%, PL 90%, EF 30% -> CABG X4     Assessment / Acute Cardiac Problems:   Multilevel degenerative spinal stenosis plan for lumbar laminectomy as per neurosurgery. Ischemic cardiomyopathy with recently recovered LVEF status post CABG X3 in 2016 LIMA to LAD, SVG to PDA to PL, SVG to RI. Hypertension. Hyperlipidemia. Smoker.   COPD    Patient Active Problem List:     Essential hypertension     Hyperlipidemia     NSTEMI (non-ST elevated myocardial infarction) ([address]     Tobacco abuse     S/P cardiac cath- Multivessel CAD 3/24/16-Dr. winters     Acute hypokalemia     S/P CABG x 4     Coronary artery disease involving coronary bypass graft of native heart without angina pectoris     Anxiety     Sepsis ([address]     Acute left ankle pain     Pseudogout of ankle     Left leg weakness     Primary hypertension     Ischemic cardiomyopathy     Microcytosis     Class 1 obesity due to excess calories with serious comorbidity and body mass index (BMI) of 31.0 to 31.9 in adult     COPD (chronic obstructive pulmonary disease) (HCC)     Monoarticular arthritis     Fall     SIRS (systemic inflammatory response syndrome) ([facility]     Spinal stenosis of lumbar region at multiple levels     Pseudogout involving multiple joints      Plan of Treatment:   Stable from CV standpoint. Continue present medications and POC  Per patient, not planning surgery now. If any plans would be mod risk from CV standpoint  Will sign off. Please call with questions/concerns.      Electronically signed by PRESTON Yost CNP on 11/18/2022 at 2:55 PM  [address]  [phone]

## 2022-11-18 NOTE — DISCHARGE INSTR - COC
Continuity of Care Form    Patient Name: Joao Corrales   :  1950  MRN:  [id number]    Admit date:  2022  Discharge date:  2022    Code Status Order: Full Code   Advance Directives:     Admitting Physician:  No admitting provider for patient encounter. PCP: Kiersten Harden MD    Discharging Nurse: [facility] Unit/Room#:   Discharging Unit Phone Number: [phone]    Emergency Contact:   Extended Emergency Contact Information  Primary Emergency Contact: Alicja Christopher-Girlfriend  Address: [address] [place] [address], [facility] Phone: [phone]  Relation: Other    Past Surgical History:  Past Surgical History:   Procedure Laterality Date    BLADDER TUMOR EXCISION  01/15/2016    TURB with gyrus    CARDIAC DEFIBRILLATOR PLACEMENT  2016    Unsafe Pacemaker. [place] 9881 ICD Model # [id number].  RV Lead [employer] SG Model # [id number]    CORONARY ARTERY BYPASS GRAFT  03/27/2016    X 4 Emergent    CORONARY ARTERY BYPASS GRAFT  2016    CYSTOSCOPY      CYSTOSCOPY  01/15/2016    CYSTOSCOPY  10/26/2022    CYSTOSCOPY BLADDER BIOPSY, FULGARATION    CYSTOSCOPY N/A 10/26/2022    CYSTOSCOPY BLADDER BIOPSY, FULGARATION performed by Tanna Gasca MD at [place] [address] Right     LIPOMA RESECTION  child    anterior neck    TONSILLECTOMY AND ADENOIDECTOMY  child       Immunization History:   Immunization History   Administered Date(s) Administered    COVID-19, PFIZER GRAY top, DO NOT Dilute, (age 15 y+), IM, 30 mcg/0.3 mL 2022    COVID-19, PFIZER PURPLE top, DILUTE for use, (age 15 y+), 30mcg/0.3mL 2021, 2021    Influenza Virus Vaccine 2016, 10/01/2017, 10/01/2017, 10/17/2018, 2019    Influenza, AFLURIA (age 1 yrs+), FLUZONE, (age 10 mo+), MDV, 0.5mL 2016    Influenza, High Dose (Fluzone 65 yrs and older) 10/10/2018, 2019    Pneumococcal Polysaccharide ([id number]) 2016       Active Problems:  Patient Active Problem List   Diagnosis Code    Essential hypertension I10    Hyperlipidemia E78.5    NSTEMI (non-ST elevated myocardial infarction) ([facility] [address] I21.4    Tobacco abuse Z72.0    S/P cardiac cath- Multivessel CAD 3/24/16-Dr. Felix Blunt K96.255    Acute hypokalemia E87.6    S/P CABG x 4 Z95.1    Coronary artery disease involving coronary bypass graft of native heart without angina pectoris I25.810    Anxiety F41.9    Sepsis ([facility]) A41.9    Acute left ankle pain M25.572    Pseudogout of ankle M11.279    Left leg weakness R29.898    Primary hypertension I10    Ischemic cardiomyopathy I25.5    Microcytosis R71.8    Class 1 obesity due to excess calories with serious comorbidity and body mass index (BMI) of 31.0 to 31.9 in adult E66.09, Z68.31    COPD (chronic obstructive pulmonary disease) ([facility]) J44.9    Monoarticular arthritis M13.10    Fall W19. XXXA    SIRS (systemic inflammatory response syndrome) ([facility]) R65.10    Spinal stenosis of lumbar region at multiple levels M48.061    Pseudogout involving multiple joints M11.89       Isolation/Infection:   Isolation            No Isolation          Patient Infection Status       Infection Onset Added Last Indicated Last Indicated By Review Planned Expiration Resolved Resolved By    None active    Resolved    COVID-19 (Rule Out) 11/11/22 11/11/22 11/11/22 COVID-19, Rapid (Ordered)   11/11/22 Rule-Out Test Resulted            Nurse Assessment:  Last Vital Signs: /72   Pulse 78   Temp 98.4 °F (36.9 °C) (Oral)   Resp 17   Ht 5' 9\" (1.753 m)   Wt 199 lb 15.3 oz (90.7 kg)   SpO2 95%   BMI 29.53 kg/m²     Last documented pain score (0-10 scale): Pain Level: 5  Last Weight:   Wt Readings from Last 1 Encounters:   11/18/22 199 lb 15.3 oz (90.7 kg)     Mental Status:  oriented, alert, coherent, logical, thought processes intact, and able to concentrate and follow conversation    IV Access:  - None    Nursing Mobility/ADLs:  Walking   Assisted  Transfer Assisted  Bathing  Assisted  Dressing  Assisted  Toileting  Assisted  Feeding  Independent  Med Admin  Independent  Med Delivery   whole    Wound Care Documentation and Therapy:        Elimination:  Continence: Bowel: No  Bladder: No  Urinary Catheter: None   Colostomy/Ileostomy/Ileal Conduit: No       Date of Last BM: 11/18/2022    Intake/Output Summary (Last 24 hours) at 11/18/2022 1308  Last data filed at 11/18/2022 0910  Gross per 24 hour   Intake 960 ml   Output --   Net 960 ml     I/O last 3 completed shifts: In: 5 [P.O.:720]  Out: -     Safety Concerns: At Risk for Falls    Impairments/Disabilities:      None    Nutrition Therapy:  Current Nutrition Therapy:   - Oral Diet:  General    Routes of Feeding: Oral  Liquids: Thin Liquids  Daily Fluid Restriction: no  Last Modified Barium Swallow with Video (Video Swallowing Test): not done    Treatments at the Time of Hospital Discharge:   Respiratory Treatments: ***  Oxygen Therapy:  is on oxygen at *** L/min per nasal cannula.   Ventilator:    - No ventilator support    Rehab Therapies: Physical Therapy and Occupational Therapy  Weight Bearing Status/Restrictions: No weight bearing restrictions  Other Medical Equipment (for information only, NOT a DME order):  walker  Other Treatments: ***    Patient's personal belongings (please select all that are sent with patient):  Glasses    RN SIGNATURE:  Electronically signed by Delaney Law RN on 11/18/22 at 3:05 PM EST    CASE MANAGEMENT/SOCIAL WORK SECTION    Inpatient Status Date: ***    Readmission Risk Assessment Score:  Readmission Risk              Risk of Unplanned Readmission:  16           Discharging to Facility/ Agency   Name: Ananya Foster  Address: [address] [place] [address]  Phone: [phone]      / signature: Electronically signed by Paula Moran RN on 11/18/22 at 1:08 PM EST    PHYSICIAN SECTION    Prognosis: Fair    Condition at Discharge: Stable    Rehab Potential (if transferring to Rehab): Fair    Recommended Labs or Other Treatments After Discharge:     Physician Certification: I certify the above information and transfer of Alana Cox  is necessary for the continuing treatment of the diagnosis listed and that he requires [facility] for less 30 days.      Update Admission H&P: No change in H&P    PHYSICIAN SIGNATURE:  Electronically signed by Rae Noriega MD on 11/18/22 at 2:56 PM EST

## 2022-11-18 NOTE — PROGRESS NOTES
[facility] - Progress Note  Today's Date and Time: 11/18/2022, 1:30 PM    Impression :   Left ankle effusion and pain after rolling his ankle during a fall   Low likelihood of septic arthritis  Positive crystal arthropathy  Lumbar stenosis  CAD s/p CABG  COPD  HFrEF, EF (2016): 20-25 %, s/p AICD placement 2016  HTN  Chronic back pain  H/o bladder masses (benign)    Recommendations:   Monitor off antibiotics:  Discontinued Vancomycin 11/14/22  D/C  Zosyn  Ankle culture: No growth  Bursa fluid positive for calcium pyrophosphate crystals  Ankle pain appears to be related to pseudogout  CT lumbar spine for lower extremity weakness-Showing severe lumbar stenosis  Neurosurgery consulted  CT myelogram completed 11/15/22  Patient was leaning towards surgery for lumbar stenosis but has now decided against it    Medical Decision Making/Summary/Discussion:11/18/2022     Patient presented to the hospital with left ankle pain, and bilateral leg weakness. Septic arthritis excluded. Pseudogout present with ca pyrophosphate crystals  Severe lumbar stenosis accounting for bilateral leg weakness. Patient was leaning towards surgery but has now changed his mind and has declined it. Infection Control Recommendations   [place] Precautions    Antimicrobial Stewardship Recommendations     Discontinuation of therapy    Coordination of Outpatient Care:   Estimated Length of IV antimicrobials:TBD  Patient will need Midline Catheter Insertion: TBD  Patient will need PICC line Insertion:BD  Patient will need: Home IV , [place],  SNF,  LTAC: TBD  Patient will need outpatient wound care:No    Chief complaint/reason for consultation:   Concern for septic arthritis      History of Present Illness:   Bre Cameron is a 70y.o.-year-old male who was initially admitted on 11/11/2022.  Patient seen at the request of Dr. Spencer Deng:    Patient presents to the hospital with left ankle pain, and bilateral leg weakness. Patient reports that his symptoms started 2 to 3 days ago when he fell down and rolled his ankle, he went to [facility] where the x-ray did not reveal any fractures but there was concern for possible infection thus he was managed with doxycycline twice daily. He was discharged and he came back to [facility] ER 2 days later with the unresolved left ankle pain, bilateral leg weakness. Past medical history is significant for CAD s/p CABG, COPD, benign bladder tumors, BPH and chronic back pain. He also had a bladder cystoscopy 1 week ago for removal of bladder tumor. He has had 2 bladder masses removed previously which were negative for any malignancy    He denies any fever, nausea, vomiting, chest pain, shortness of breath or any abdominal pain, and dysuria. On evaluation patient is alert and oriented, in no apparent distress. He is afebrile, hemodynamically stable, saturating well on room air. He is complaining of neck pain and back pain but reports that the ankle pain is much improved. XR ANKLE LEFT (MIN 3 VIEWS)   Final Result   No acute osseous or soft tissue abnormality. XR FOOT LEFT (MIN 3 VIEWS)   Final Result   Soft tissue swelling without acute osseous abnormality. XR CHEST PORTABLE   Final Result   No acute cardiopulmonary findings. CT FOOT LEFT WO CONTRAST   Final Result   1. No acute osseous abnormality. Normal midfoot alignment. 2. Nonspecific subcutaneous edema. No drainable fluid collection or soft   tissue gas. CURRENT EVALUATION 11/18/2022  /72   Pulse 78   Temp 98.4 °F (36.9 °C) (Oral)   Resp 17   Ht 5' 9\" (1.753 m)   Wt 199 lb 15.3 oz (90.7 kg)   SpO2 95%   BMI 29.53 kg/m²     Afebrile  VS stable    Patient feels better  No complaints  No new issues per RN  ID wise stable. Off 02.  On room air  RR 17  02 sat 95    He is still complaining of back pain but indicates improvement    He underwent a CT of the lumbar spine that revealed severe  lumbar stenosis. No sign of osteomyelitis. Neurosurgery has been consulted and recommend L2-L4 laminectomy. Patient has declined surgery. S/p left ankle aspiration 11-12-22  WBC <11,937, many neutrophils and no bacteria seen on direct exam  Moderate calcium pyrophosphate crystals noted  Vancomycin discontinued as this appears to be an acute pseudogout attack    CRP is elevated at 402.1-->283.5        Labs, X rays reviewed: 11/18/2022    BUN: 16-->25  Cr: 0.89-->0.8    WBC: 17.7-->12.4  Hb: 13.3-->11.1  Plat:  467-->513    CRP: 391.1-->372.7-->402.1      Cultures:  Urine:  11/16/22: pending  Blood:  11-11-22: No growth  11-12-22: No growth  Sputum :    Left ankle bursa aspirate:  11/12/22: No bacteria seen  MRSA Nares:  11/12/22: Not detected    Imaging:     CXR 11-11-22      Left ankle 11-11-22      Discussed with patient, RN, family. I have personally reviewed the past medical history, past surgical history, medications, social history, and family history, and I have updated the database accordingly. Past Medical History:     Past Medical History:   Diagnosis Date    Abnormal EKG     anterior fascicular block    Bladder tumor     CAD (coronary artery disease)     Clinical trial participant at discharge 03/24/2016    disqualified 3/24/2016    COPD (chronic obstructive pulmonary disease) ([facility] [address]     Depression     GERD (gastroesophageal reflux disease)     Hearing loss     History of blood transfusion     Hyperlipidemia     Hypertension 2012    Panic attacks     Prostate hypertrophy     Under care of team     Dr. Armando Redmond, cardiology    Wellness examination     -PCP seen in Dec. 2019       Past Surgical  History:     Past Surgical History:   Procedure Laterality Date    BLADDER TUMOR EXCISION  01/15/2016    TURB with gyrus    CARDIAC DEFIBRILLATOR PLACEMENT  09/22/2016    Unsafe Pacemaker. [place] [employer] 9881 ICD Model # C4880093.  RV Lead [employer] SG Model # [id number]    CORONARY ARTERY BYPASS GRAFT  03/27/2016    X 4 Emergent    CORONARY ARTERY BYPASS GRAFT  2016    CYSTOSCOPY      CYSTOSCOPY  01/15/2016    CYSTOSCOPY  10/26/2022    CYSTOSCOPY BLADDER BIOPSY, FULGARATION    CYSTOSCOPY N/A 10/26/2022    CYSTOSCOPY BLADDER BIOPSY, FULGARATION performed by Vicky Moreland MD at [place] [id number] Right     LIPOMA RESECTION  child    anterior neck    TONSILLECTOMY AND ADENOIDECTOMY  child       Medications:      colchicine  1.2 mg Oral Once    colchicine  0.6 mg Oral BID    polyethylene glycol  17 g Oral Daily    atorvastatin  40 mg Oral Nightly    buPROPion  450 mg Oral QAM    [Held by provider] clopidogrel  75 mg Oral Daily    budesonide-formoterol  2 puff Inhalation BID    furosemide  40 mg Oral Daily    lisinopril  2.5 mg Oral Daily    metoprolol tartrate  25 mg Oral BID    traZODone  150 mg Oral Nightly    vilazodone HCl  20 mg Oral Daily    sodium chloride flush  5-40 mL IntraVENous 2 times per day    enoxaparin  40 mg SubCUTAneous Daily    gabapentin  100 mg Oral TID    sennosides-docusate sodium  2 tablet Oral Daily    ARIPiprazole  10 mg Oral Daily       Social History:     Social History     Socioeconomic History    Marital status:      Spouse name: Not on file    Number of children: 1    Years of education: Not on file    Highest education level: Not on file   Occupational History    Occupation: [employer] man   Tobacco Use    Smoking status: Former     Packs/day: 0.50     Types: Cigars, Cigarettes     Start date: 1966     Quit date: 2021     Years since quittin.0    Smokeless tobacco: Former     Types: Snuff, Chew     Quit date: 3/23/2016   Vaping Use    Vaping Use: Never used   Substance and Sexual Activity    Alcohol use:  Yes     Alcohol/week: 7.0 standard drinks     Types: 7 Cans of beer per week     Comment: occasionally    Drug use: Yes     Types: Marijuana León Hairston    Sexual activity: Not on file   Other Topics Concern    Not on file   Social History Narrative    Not on file     Social Determinants of Health     Financial Resource Strain: Low Risk     Difficulty of Paying Living Expenses: Not hard at all   Food Insecurity: No Food Insecurity    Worried About [address] in the Last Year: Never true    [address] [facility] [address] in the Last Year: Never true   Transportation Needs: No Transportation Needs    Lack of Transportation (Medical): No    Lack of Transportation (Non-Medical): No   Physical Activity: Insufficiently Active    Days of Exercise per Week: 3 days    Minutes of Exercise per Session: 30 min   Stress: Stress Concern Present    Feeling of Stress : Very much   Social Connections: Socially Isolated    Frequency of Communication with Friends and Family: Never    Frequency of Social Gatherings with Friends and Family: More than three times a week    Attends [employer] Services: Never    Active Member of Clubs or Organizations: No    Attends Club or Organization Meetings: Never    Marital Status:    Intimate Partner Violence: Not At Risk    Fear of Current or Ex-Partner: No    Emotionally Abused: No    Physically Abused: No    Sexually Abused: No   Housing Stability: Unknown    Unable to Pay for Housing in the Last Year: No    Number of Places Lived in the Last Year: Not on file    Unstable Housing in the Last Year: No       Family History:     Family History   Problem Relation Age of Onset    Other Mother         blind    Heart Surgery Mother     Cancer Father         leukemia        Allergies:   Patient has no known allergies. Review of Systems:   Constitutional: No fevers or chills. No systemic complaints  Head: No headaches  Eyes: No double vision or blurry vision. No conjunctival inflammation. ENT: No sore throat or runny nose. . No hearing loss, tinnitus or vertigo. Cardiovascular: No chest pain or palpitations. No shortness of breath. No TALBOT  Lung: No shortness of breath or cough.  No sputum production  Abdomen: No nausea, vomiting, diarrhea, or abdominal pain. Catrachito Mering No cramps. Genitourinary: No increased urinary frequency, or dysuria. No hematuria. No suprapubic or CVA pain  Musculoskeletal: left ankle pain, bilateral lower extremity pain and weakness  Neurologic: No headache, weakness, numbness, or tingling. Integument: No rash, no ulcers. Psychiatric: No depression. Endocrine: No polyuria, no polydipsia, no polyphagia. Physical Examination :   Patient Vitals for the past 8 hrs:   BP Temp Temp src Pulse Resp SpO2   11/18/22 1200 -- 98.4 °F (36.9 °C) Oral -- -- --   11/18/22 0800 138/72 98.6 °F (37 °C) Oral 78 17 95 %   11/18/22 0752 -- -- -- 80 18 --       General Appearance: Awake, alert, and in no apparent distress  Head:  Normocephalic, no trauma  Eyes: Pupils equal, round, reactive to light and accommodation; extraocular movements intact; sclera anicteric; conjunctivae pink. No embolic phenomena. ENT: Oropharynx clear, without erythema, exudate, or thrush. No tenderness of sinuses. Mouth/throat: mucosa pink and moist. No lesions. Dentition in good repair. Neck:Supple, without lymphadenopathy. Thyroid normal, No bruits. Pulmonary/Chest: Clear to auscultation, without wheezes, rales, or rhonchi. No dullness to percussion. Cardiovascular: Regular rate and rhythm without murmurs, rubs, or gallops. Abdomen: Soft, non tender. Bowel sounds normal. No organomegaly  All four Extremities: no swelling, tenderness on palapation of left ankle, tenderness on palpation of lower extremity, restricted ROM left ankle, no warmth or redness. Neurologic: No gross sensory or motor deficits. Skin: Warm and dry with good turgor. No signs of peripheral arterial or venous insufficiency. No ulcerations. No open wounds.     Medical Decision Making -Laboratory:   I have independently reviewed/ordered the following labs:    CBC with Differential:   Recent Labs     11/16/22  0624   WBC 12.4*   HGB 11.1*   HCT 34.4* *   LYMPHOPCT 9*   MONOPCT 9*         BMP:   Recent Labs     11/16/22  0624      K 3.4*   CL 99   CO2 26   BUN 25*   CREATININE 0.80   MG 2.1       Hepatic Function Panel:   No results for input(s): PROT, LABALBU, BILIDIR, IBILI, BILITOT, ALKPHOS, ALT, AST in the last 72 hours. No results for input(s): RPR in the last 72 hours. No results for input(s): HIV in the last 72 hours. No results for input(s): BC in the last 72 hours. Lab Results   Component Value Date/Time    MUCUS 1+ 11/16/2022 05:30 AM    RBC 4.65 11/16/2022 06:24 AM    RBC 4.85 02/13/2012 11:22 AM    TRICHOMONAS NOT REPORTED 03/26/2016 02:30 PM    WBC 12.4 11/16/2022 06:24 AM    YEAST NOT REPORTED 03/26/2016 02:30 PM    TURBIDITY Clear 11/16/2022 05:30 AM     Lab Results   Component Value Date/Time    CREATININE 0.80 11/16/2022 06:24 AM    GLUCOSE 92 11/16/2022 06:24 AM    GLUCOSE 105 02/13/2012 11:22 AM       Medical Decision Making-Imaging:     Narrative   EXAMINATION:   THREE XRAY VIEWS OF THE LEFT FOOT       11/12/2022 6:51 am       COMPARISON:   None. HISTORY:   ORDERING SYSTEM PROVIDED HISTORY: infection   TECHNOLOGIST PROVIDED HISTORY:   infection       FINDINGS:   There is no acute osseous abnormality. The joint spaces are maintained. There is soft tissue swelling. Impression   Soft tissue swelling without acute osseous abnormality. Narrative   EXAMINATION:   THREE XRAY VIEWS OF THE LEFT ANKLE       11/12/2022 6:51 am       COMPARISON:   None. HISTORY:   ORDERING SYSTEM PROVIDED HISTORY: infection   TECHNOLOGIST PROVIDED HISTORY:   infection       FINDINGS:   There is no acute osseous abnormality. The joint spaces are maintained. The   surrounding soft tissues are unremarkable. Impression   No acute osseous or soft tissue abnormality.      Narrative   EXAMINATION:   ONE XRAY VIEW OF THE CHEST       11/11/2022 7:51 pm       COMPARISON:   05/17/2016       HISTORY:   [address] PROVIDED HISTORY: Sepsis   TECHNOLOGIST PROVIDED HISTORY:   Sepsis   Reason for Exam: upr,leg pain,sepsis       Initial encounter       FINDINGS:   Status post median sternotomy. Cardiac AICD is noted. No focal   consolidation, pleural effusion or pneumothorax. The cardiomediastinal   silhouette is stable. No overt pulmonary edema. The osseous structures are   stable. Impression   No acute cardiopulmonary findings. Narrative   EXAMINATION:   CT OF THE LEFT FOOT WITHOUT CONTRAST 11/11/2022 6:36 pm       TECHNIQUE:   CT of the left foot was performed without the administration of intravenous   contrast.  Multiplanar reformatted images are provided for review. Automated   exposure control, iterative reconstruction, and/or weight based adjustment of   the mA/kV was utilized to reduce the radiation dose to as low as reasonably   achievable. COMPARISON:   None. HISTORY   ORDERING SYSTEM PROVIDED HISTORY: possible tarsal avulsion seen on prior xray   TECHNOLOGIST PROVIDED HISTORY:   possible tarsal avulsion seen on prior xray   Decision Support Exception - unselect if not a suspected or confirmed   emergency medical condition->Emergency Medical Condition (MA)       FINDINGS:   The distal tibia and fibula are intact. No syndesmotic widening. The ankle   mortise is intact. Mild chondrocalcinosis of the tibiotalar joint. The   tibial plafond and talar dome are normal in appearance. The subtalar joint   is unremarkable. The talonavicular and calcaneocuboid joints are   unremarkable. The naviculocuneiform articulations are unremarkable. Mild   2nd tarsometatarsal degenerative changes. Normal midfoot alignment is   maintained. No Lisfranc interval widening. Mild chondrocalcinosis of the   midfoot. The metatarsophalangeal and interphalangeal joints are intact. No   fracture or dislocation identified. No osseous erosion or periosteal   reaction.        Mild atherosclerotic vascular calcifications. The visualized tendons are   grossly intact. Dorsal mid and forefoot subcutaneous edema. Circumferential   subcutaneous edema about the ankle. No soft tissue gas or drainable fluid   collection. Impression   1. No acute osseous abnormality. Normal midfoot alignment. 2. Nonspecific subcutaneous edema. No drainable fluid collection or soft   tissue gas. Narrative   EXAMINATION:   CT OF THE LUMBAR SPINE WITH CONTRAST  11/14/2022 11:58 am       TECHNIQUE:   CT of the lumbar spine was performed with the administration of intravenous   contrast. Multiplanar reformatted images are provided for review. Automated   exposure control, iterative reconstruction, and/or weight based adjustment of   the mA/kV was utilized to reduce the radiation dose to as low as reasonably   achievable. COMPARISON:   Lumbar spine radiographs 11/08/2008       HISTORY:   ORDERING SYSTEM PROVIDED HISTORY: concern for slipped disc as well as   possible mass/psoas abbcess or lytic/blasic lesions suggesting underlying   maligancy contributing to possible pathological fracture. TECHNOLOGIST PROVIDED HISTORY:   concern for slipped disc as well as possible mass/psoas abbcess or   lytic/blasic lesions suggesting underlying maligancy contributing to possible   pathological fracture. FINDINGS:   BONES/ALIGNMENT:  There is a levoconvex lumbar scoliosis. There is   multilevel disc space narrowing and degenerative endplate sclerosis   throughout the lower thoracic and lumbar spine. There is no spondylolysis. No lytic or blastic osseous lesions are identified. SOFT TISSUES: There is no paraspinal mass identified. There is no abnormal   fluid collection identified. Severe atherosclerotic calcifications are   present within the abdominal aorta. L1-L2: There is a disc bulge and osteophyte formation. There is bilateral   facet arthropathy. There is severe left neural foraminal narrowing.   No significant spinal canal stenosis or right neural foraminal narrowing is   present. L2-L3: There is a disc bulge, facet arthropathy and thickening of the   ligamentum flavum. There is severe left neural foraminal narrowing. There   is no significant right neural foraminal narrowing. Mild spinal canal   stenosis is present. L3-L4: There is a disc bulge, facet arthropathy and thickening of the   ligamentum flavum. , severe right and moderate left neural foraminal   narrowing. There is moderate spinal canal stenosis. L4-L5: There is a disc bulge, facet arthropathy and thickening of the   ligamentum flavum. There is severe spinal canal stenosis, severe right and   mild left neural foraminal narrowing. L5-S1: There is a disc bulge, posterior osteophyte formation and facet   arthropathy. There is no significant spinal canal stenosis. Severe   bilateral neural foraminal narrowing is present. Impression   1. No findings to suggest acute discitis/osteomyelitis. 2. No abscess identified. 3. Severe multilevel degenerative changes of the lumbar spine, as described   above, but most pronounced at L4-5 where there is severe spinal canal   stenosis, severe right and mild left neural foraminal narrowing. Narrative   EXAMINATION:   CT OF THE LUMBAR SPINE WITH CONTRAST  11/15/2022 1:38 pm       TECHNIQUE:   CT of the lumbar spine was performed with the administration of intravenous   contrast. Multiplanar reformatted images are provided for review. Automated   exposure control, iterative reconstruction, and/or weight based adjustment of   the mA/kV was utilized to reduce the radiation dose to as low as reasonably   achievable.        COMPARISON:   None       HISTORY:   ORDERING SYSTEM PROVIDED HISTORY: obtain after myelogram for lumbar stenosis   TECHNOLOGIST PROVIDED HISTORY:   obtain after myelogram for lumbar stenosis   Reason for Exam: obtain after myelogram for lumbar stenosis FINDINGS:   BONES/ALIGNMENT:  There is levocurvature of the lumbar spine. The vertebral   body heights are maintained. There is no spondylolisthesis. SOFT TISSUES: The posterior paraspinal soft tissues are unremarkable. The   visualized abdominal structures are unremarkable. There is uncomplicated   colonic diverticulosis. Conus is normal in caliber and terminates at the L2   level. The cauda equina is unremarkable. L1-L2: There is a circumferential disc bulge with facet hypertrophy. There is   no canal stenosis. There is mild right and moderate left foraminal narrowing. L2-L3: There is a circumferential disc bulge with facet hypertrophy. There   is canal stenosis measuring 7 mm in AP dimension. There is moderate   bilateral foraminal narrowing. L3-L4: There is a circumferential disc bulge with facet hypertrophy. There   is canal stenosis measuring 9 mm in AP dimension. There is severe bilateral   foraminal narrowing. L4-L5: There is a circumferential disc bulge with facet and ligamentous   hypertrophy. There is canal stenosis measuring 8 mm in AP dimension. There   is severe bilateral foraminal narrowing. L5-S1: There is a circumferential disc bulge. There is no canal stenosis. There is severe bilateral foraminal narrowing. Impression   Multilevel degenerative disc disease with associated multilevel degenerative   facet hypertrophy resulting in canal stenosis most severe at L2-3. Bilateral foraminal narrowing as described above.                Medical Decision Ffeifw-[id number]:       Medical Decision Making-Other:     Note:  Labs, medications, radiologic studies were reviewed with personal review of films  Large amounts of data were reviewed  Discussed with nursing Staff, Discharge planner  Infection Control and Prevention measures reviewed  All prior entries were reviewed  Administer medications as ordered  Prognosis: [address]  Discharge

## 2022-11-18 NOTE — PLAN OF CARE
Problem: Respiratory - Adult  Goal: Achieves optimal ventilation and oxygenation  11/18/2022 0902 by Ricardo Bond RCP  Outcome: Progressing  11/18/2022 0133 by Dragan Ruiz RN  Outcome: Progressing   BRONCHOSPASM/BRONCHOCONSTRICTION     [x]         IMPROVE AERATION/BREATH SOUNDS  [x]   ADMINISTER BRONCHODILATOR THERAPY AS APPROPRIATE  [x]   ASSESS BREATH SOUNDS  []   IMPLEMENT AEROSOL/MDI PROTOCOL  [x]   PATIENT EDUCATION AS NEEDED

## 2022-11-18 NOTE — PLAN OF CARE
Problem: Discharge Planning  Goal: Discharge to home or other facility with appropriate resources  11/18/2022 0133 by Shira Smith RN  Outcome: Progressing  Flowsheets (Taken 11/17/2022 2000)  Discharge to home or other facility with appropriate resources:   Identify barriers to discharge with patient and caregiver   Identify discharge learning needs (meds, wound care, etc)  11/17/2022 1601 by Jake Day RN  Outcome: Progressing     Problem: Pain  Goal: Verbalizes/displays adequate comfort level or baseline comfort level  11/18/2022 0133 by Shira Smith RN  Outcome: Progressing  11/17/2022 1601 by Jake Day RN  Outcome: Progressing     Problem: Safety - Adult  Goal: Free from fall injury  11/18/2022 0133 by Shira Smith RN  Outcome: Progressing  [address] (Taken 11/17/2022 2000)  Free From Fall Injury:   Instruct family/caregiver on patient safety   Based on caregiver fall risk screen, instruct family/caregiver to ask for assistance with transferring infant if caregiver noted to have fall risk factors  11/17/2022 1601 by Jake Day RN  Outcome: Progressing     Problem: ABCDS Injury Assessment  Goal: Absence of physical injury  11/18/2022 0133 by Shira Smith RN  Outcome: Progressing  Flowsheets (Taken 11/17/2022 2000)  Absence of Physical Injury: Implement safety measures based on patient assessment  11/17/2022 1601 by Jake Day RN  Outcome: Progressing     Problem: Respiratory - Adult  Goal: Achieves optimal ventilation and oxygenation  11/18/2022 0133 by Shira Smith RN  Outcome: Progressing  11/17/2022 1601 by Jake Day RN  Outcome: Progressing     Problem: Skin/Tissue Integrity  Goal: Absence of new skin breakdown  Description: 1. Monitor for areas of redness and/or skin breakdown  2. Assess vascular access sites hourly  3. Every 4-6 hours minimum:  Change oxygen saturation probe site  4.   Every 4-6 hours:  If on nasal continuous positive airway pressure, respiratory therapy assess nares and determine need for appliance change or resting period.   11/18/2022 0133 by Berhane Brumfield RN  Outcome: Progressing  11/17/2022 1601 by Yassine Turner RN  Outcome: Progressing

## 2022-11-18 NOTE — PROGRESS NOTES
Neurosurgery [facility]/Resident    Daily Progress Note   Chief Complaint   Patient presents with    Ankle Pain     11/18/2022  11:36 AM    Chart reviewed. No acute events overnight. No new complaints. Reports improvement in his back and leg range of motion since beginning of hospitalization. Vitals:    11/18/22 0425 11/18/22 0515 11/18/22 0752 11/18/22 0800   BP: 119/66   138/72   Pulse: 72  80 78   Resp: 25 18 17   Temp: 98.9 °F (37.2 °C)   98.6 °F (37 °C)   TempSrc: Oral   Oral   SpO2: 96%   95%   Weight:  199 lb 15.3 oz (90.7 kg)     Height:             PE: AOx3   Motor   L iliopsoas 4/5 , R iliopsoas 4/5  L quadriceps 4/5; R quadriceps 4/5  L Dorsiflexion 4/5; R dorsiflexion 4/5  L Plantarflexion 4/5; R plantarflexion 4/5  L EHL 4/5; R EHL 4/5    Sensation intact       Lab Results   Component Value Date    WBC 12.4 (H) 11/16/2022    HGB 11.1 (L) 11/16/2022    HCT 34.4 (L) 11/16/2022     (H) 11/16/2022    CHOL 179 12/01/2017    TRIG 173 12/01/2017    HDL 47 12/01/2017    ALT <5 (L) 11/12/2022    AST 11 11/12/2022     11/16/2022    K 3.4 (L) 11/16/2022    CL 99 11/16/2022    CREATININE 0.80 11/16/2022    BUN 25 (H) 11/16/2022    CO2 26 11/16/2022    TSH 0.36 11/11/2022    PSA 0.88 11/12/2022    INR 1.2 11/12/2022    LABA1C 5.8 12/01/2017    .5 (H) 11/17/2022    SEDRATE 117 (H) 11/11/2022         A/P  70 y.o. male who presents with multilevel degenerative changes with stenosis                  - patient feels that his back pain and BLE weakness have improved and declines any neurosurgery at this time   - patient is stable from [facility] standpoint for NH and can follow up outpatient in 2 months      Please contact neurosurgery with any changes in patients neurologic status.        Jacquelyn Herzog PA-C  11/18/22  11:36 AM

## 2022-11-20 NOTE — DISCHARGE SUMMARY
[facility]  Office: [address], DO, Rosas Graves, DO, Babar Christiansen, DO, Melva Mehta, DO, Artem Alex MD, Ynes Oviedo MD, Asher Freitas MD, Selena Lino MD,  Eboni Tran MD, Haris Tello MD, Velma Jones, DO, Elisa Spann MD,  Mike Isaac, DO, Flakita Zuñiga MD, Dima Avalos MD, Aline Baldwin DO, Radha Dalton MD, Suzanna Mack MD, Noel Pickard, DO, Claude Gresham MD, Tatiana Pedro MD, Jose Roberto Lee MD, Bouchra Ruiz MD, Christin Cheney, DO, Sarai Michael MD, Tres Edmond MD, Dominic Rawls, [facility],  Archana Rose, CNP, Silvano Gloria, CNP, Uriel Pino, CNP,  Franki Lundborg, [facility], Indy Clark, CNP, Reyna Angeles, CNP, Claudia Guzman, CNP, Amanda Rios, CNP, Jennifer Roy, CNP, Kaylah Casey PA-C, Krunal Liang, CNS, Lisa Hrenández, [facility], Renetta Polanco, CNP, Alida Hargrove, CNP, Andrew Small, CNP         Jerardo Mayorga [address]    Discharge Summary     Patient ID: Trinity Ahmadi  :  1950   MRN: [id number]     ACCOUNT:  [de-identified]   Patient's PCP: Jorge Alberto Choi MD  Admit Date: 2022   Discharge Date: 22    Length of Stay: 7  Code Status:  Prior  Admitting Physician: No admitting provider for patient encounter.   Discharge Physician: Suzanna Mack MD     Active Discharge Diagnoses:     Hospital Problem Lists:  Principal Problem:    Pseudogout of ankle  Active Problems:    Sepsis ([facility] [address]    Acute left ankle pain    Left leg weakness    Primary hypertension    Ischemic cardiomyopathy    Microcytosis    Class 1 obesity due to excess calories with serious comorbidity and body mass index (BMI) of 31.0 to 31.9 in adult    COPD (chronic obstructive pulmonary disease) (HCC)    Monoarticular arthritis    Fall    SIRS (systemic inflammatory response syndrome) ([facility] [address]    Spinal stenosis of lumbar region at multiple levels    Pseudogout involving multiple joints    Hyperlipidemia S/P cardiac cath- Multivessel CAD 3/24/16-Dr. winters    Acute hypokalemia    S/P CABG x 4  Resolved Problems:    * No resolved hospital problems. *      Admission Condition:  good     Discharged Condition: good    Hospital Stay:     Hospital Course:  Matt Young is a 70 y.o. male who was admitted for the management of   Pseudogout of ankle , presented to ER with Ankle Pain      This is a 70year old male who presents to our hospital with complaints of left foot pain. CT of his left foot showed no acute osseous abnormality and normal midfoot alignment but did show subcutaneous edema without any drainable  fluid collection or soft tissue gas. Pt was febrile with Tmax 100.8, with WBC 17 and left shift. There was concern for septic arthritis. Orthopedic surgery was consulted, he has aspiration of joint which showed white count 11,937, 96% neutrophil count. Gram stain/culture and crystals were ordered and pending: He was seen by Infectious disease and started him on vancomycin. A CT with contrast of his lumbar spine was done due to leg weakness and concern for possible abscess which showed no evidence of abscess     He has also had subsequent imaging from neurosurgery including IR myelogram and has severe canal stenosis but does not appear to be candidate for surgical intervention     Canal stenosis most severe @ L2-L3  Severe bilateral foraminal narrowing    He had altered mentation 2/2 atarax and too much roxicodone  This was held and his mentation normalized    [facility]    Significant therapeutic interventions:     Significant Diagnostic Studies:   Labs / Micro:    Radiology:  XR ANKLE RIGHT (MIN 3 VIEWS)    Result Date: 11/13/2022  No acute osseous or soft tissue abnormality. XR FOOT RIGHT (MIN 3 VIEWS)    Result Date: 11/13/2022  No acute osseous or soft tissue abnormality.      CT LUMBAR SPINE W CONTRAST    Result Date: 11/15/2022  Multilevel degenerative disc disease with associated multilevel degenerative facet hypertrophy resulting in canal stenosis most severe at L2-3. Bilateral foraminal narrowing as described above. CT LUMBAR SPINE W CONTRAST    Result Date: 11/14/2022  1. No findings to suggest acute discitis/osteomyelitis. 2. No abscess identified. 3. Severe multilevel degenerative changes of the lumbar spine, as described above, but most pronounced at L4-5 where there is severe spinal canal stenosis, severe right and mild left neural foraminal narrowing. IR MYELOGRAM LUMBOSACRAL    Result Date: 11/15/2022  Successful fluoroscopic lumbar myelogram.       Consultations:    Consults:     Final Specialist Recommendations/Findings:   IP CONSULT TO HOSPITALIST  IP CONSULT TO ORTHOPEDIC SURGERY  IP CONSULT TO INFECTIOUS DISEASES  IP CONSULT TO NEUROSURGERY  IP CONSULT TO CARDIOLOGY  IP CONSULT TO IV TEAM      The patient was seen and examined on day of discharge and this discharge summary is in conjunction with any daily progress note from day of discharge. Discharge plan:     Disposition: SNF    Physician Follow Up:     PRESTON Villatoro [address]  [phone]    Follow up in 2 month(s)         Requiring Further Evaluation/Follow Up POST HOSPITALIZATION/Incidental Findings:     Diet:     Activity: As tolerated    Instructions to Patient:     Discharge Medications:      Medication List        START taking these medications      * colchicine 0.6 MG tablet  Commonly known as: COLCRYS  Take 1 tablet by mouth 2 times daily     * colchicine 0.6 MG tablet  Commonly known as: COLCRYS  Take 2 tablets by mouth once for 1 dose           * This list has 2 medication(s) that are the same as other medications prescribed for you. Read the directions carefully, and ask your doctor or other care provider to review them with you.                 CHANGE how you take these medications      buPROPion 150 MG extended release tablet  Commonly
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
## 2025-05-01 ENCOUNTER — RESULTS FOLLOW-UP (OUTPATIENT)
Dept: UROLOGY | Age: 75
End: 2025-05-01

## 2025-05-01 LAB
AMIKACIN: NORMAL
AMPICILLIN + SULBACTAM: NORMAL
AMPICILLIN: NORMAL
CEFAZOLIN: NORMAL
CEFEPIME: NORMAL
CEFTRIAXONE: NORMAL
CIPROFLOXACIN: NORMAL
CULTURE RESULT: NORMAL
ERTAPENEM: NORMAL
GENTAMICIN: NORMAL
LEVOFLOXACIN: NORMAL
MEROPENEM: NORMAL
NITROFURANTOIN: NORMAL
PIPERACILLIN + TAZOBACTAM: NORMAL
TRIMETHOPRIM + SULFAMETHOXAZOLE: NORMAL
URINE CULTURE, ROUTINE: NORMAL STATUS

## (undated) DEVICE — SOLUTION IRRIG 3000ML STRL H2O USP UROMATIC PLAS CONT

## (undated) DEVICE — PACK PROCEDURE SURG CYSTO SVMMC LF

## (undated) DEVICE — HF-RESECTION ELECTRODE PLASMALOOP LOOP, MEDIUM, 24 FR., 12°/16°, ESG TURIS: Brand: OLYMPUS

## (undated) DEVICE — HYPODERMIC SAFETY NEEDLE: Brand: MAGELLAN

## (undated) DEVICE — 1016 S-DRAPE IRRIG POUCH 10/BOX: Brand: STERI-DRAPE™

## (undated) DEVICE — ELECTRODE PT RET AD L9FT HI MOIST COND ADH HYDRGEL CORDED

## (undated) DEVICE — GLOVE ORANGE PI 7   MSG9070

## (undated) DEVICE — CATHETER URET 5FR L70CM OPN END SGL LUMN INJ HUB FLEXIMA

## (undated) DEVICE — GLOVE SURG SZ 65 THK91MIL LTX FREE SYN POLYISOPRENE

## (undated) DEVICE — ELECTRODE ES 3FR L105CM FLX FULG SHT TIP CYSTOSCOPE

## (undated) DEVICE — PAD,NON-ADHERENT,3X8,STERILE,LF,1/PK: Brand: MEDLINE

## (undated) DEVICE — CORD LAPAROSCOPIC MONOPOLAR

## (undated) DEVICE — CRANIOTOMY DRAPE, STERILE: Brand: MEDLINE